# Patient Record
Sex: FEMALE | Race: WHITE | NOT HISPANIC OR LATINO | Employment: FULL TIME | URBAN - METROPOLITAN AREA
[De-identification: names, ages, dates, MRNs, and addresses within clinical notes are randomized per-mention and may not be internally consistent; named-entity substitution may affect disease eponyms.]

---

## 2017-01-10 RX ORDER — CLONAZEPAM 0.5 MG/1
0.25 TABLET ORAL 2 TIMES DAILY
Qty: 30 TAB | Refills: 0 | Status: SHIPPED
Start: 2017-01-18 | End: 2017-02-03

## 2017-01-10 RX ORDER — VENLAFAXINE HYDROCHLORIDE 75 MG/1
CAPSULE, EXTENDED RELEASE ORAL
Qty: 30 CAP | Refills: 0 | Status: SHIPPED | OUTPATIENT
Start: 2017-01-10 | End: 2017-02-03

## 2017-01-10 RX ORDER — BUPROPION HYDROCHLORIDE 150 MG/1
TABLET ORAL
Qty: 90 TAB | Refills: 0 | Status: SHIPPED | OUTPATIENT
Start: 2017-01-10 | End: 2017-02-03

## 2017-02-03 ENCOUNTER — OFFICE VISIT (OUTPATIENT)
Dept: BEHAVIORAL HEALTH | Facility: PHYSICIAN GROUP | Age: 57
End: 2017-02-03
Payer: COMMERCIAL

## 2017-02-03 VITALS
HEART RATE: 93 BPM | BODY MASS INDEX: 24.25 KG/M2 | DIASTOLIC BLOOD PRESSURE: 74 MMHG | HEIGHT: 68 IN | WEIGHT: 160 LBS | SYSTOLIC BLOOD PRESSURE: 114 MMHG

## 2017-02-03 DIAGNOSIS — F33.41 MDD (MAJOR DEPRESSIVE DISORDER), RECURRENT, IN PARTIAL REMISSION (HCC): ICD-10-CM

## 2017-02-03 PROCEDURE — 99213 OFFICE O/P EST LOW 20 MIN: CPT | Performed by: PSYCHIATRY & NEUROLOGY

## 2017-02-03 RX ORDER — CLONAZEPAM 0.5 MG/1
0.25 TABLET ORAL 2 TIMES DAILY
Qty: 30 TAB | Refills: 1 | Status: SHIPPED
Start: 2017-02-03 | End: 2017-04-07 | Stop reason: SDUPTHER

## 2017-02-03 RX ORDER — VENLAFAXINE HYDROCHLORIDE 75 MG/1
75 CAPSULE, EXTENDED RELEASE ORAL DAILY
Qty: 30 CAP | Refills: 1 | Status: SHIPPED | OUTPATIENT
Start: 2017-02-03 | End: 2017-03-25 | Stop reason: SDUPTHER

## 2017-02-03 RX ORDER — PHENTERMINE HYDROCHLORIDE 37.5 MG/1
37.5 TABLET ORAL
COMMUNITY
End: 2017-04-07

## 2017-02-03 RX ORDER — BUPROPION HYDROCHLORIDE 150 MG/1
150 TABLET ORAL EVERY MORNING
Qty: 90 TAB | Refills: 1 | Status: SHIPPED | OUTPATIENT
Start: 2017-02-03 | End: 2017-04-07 | Stop reason: SDUPTHER

## 2017-02-03 NOTE — PROGRESS NOTES
PSYCHIATRY FOLLOW-UP NOTE      Chief Complaint   Patient presents with   • Follow-Up     depression, anxiety         History Of Present Illness:  Sammi Alberto is a 56 y.o. old female with major depressive disorder, hypothyroidism, s/p lap band surgery, parathyroidectomy, left ACL and meniscal repair comes in today for follow up, was last seen 6 weeks ago. She reports doing better since last visit when she was switched over to Effexor. He really likes the combination of Wellbutrin and Effexor and has noticed a significant improvement in her mood. She is feeling a lot less angry and is able to process her feelings in a much better way. Denies any side effects that she noticed with Effexor. She has been taking her Klonopin mostly one day and has noticed an improvement in her anxiety and sleep as well. She is no longer using the Lunesta for sleep. She has noticed an improvement in appetite and some weight gain and has been following with a weight loss clinic and [place]. She was recently prescribed phentermine which she has taken in the past with good results on her weight. She is also trying to be more physically active and is eating healthy to. Her boyfriend and her sister have noticed an improvement in her mood as well. She is happy that she did not give up on her relationship and is moving in with her boyfriend soon. She was to keep her apartment just to see how the move close. She still feels distant to her boyfriend and other family and is trying to work on it. She is trying to look for another therapist as she felt it her previous therapist was not much helpful for her. She is able to trust her boyfriend better and is feeling more happy about this relationship. Denies having thoughts of wanting to hurt herself or others.    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 In a relationship for 4 years.  and  x 1, was   for 17 years and got / 7 years ago. She has 2 kids from her marriage - 21 and 21 yo sons. Older son lives in [place] and younger son lives in [facility]. She is close with both of her sons, all 4 siblings and mother. Father  when she was in her teen years. Lives alone in [place], has been living there for 1.5 years and will be moving in with her boyfriend soon.    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 90 Tab 1   • venlafaxine XR (EFFEXOR XR) 75 MG CAPSULE SR 24 HR Take 1 Cap by mouth every day. 30 Cap 1   • clonazepam (KLONOPIN) 0.5 MG Tab Take 0.5 Tabs by mouth 2 times a day. 30 Tab 1   • SUMAtriptan Succinate (IMITREX) 6 MG/0.5ML Solution Inject 6 mg as instructed Once.     • sumatriptan (IMITREX) 100 MG tablet Take 100 mg by mouth Once PRN for Migraine.     • valacyclovir (VALTREX) 1 GM Tab Take 1,000 mg by mouth 2 times a day.     • therapeutic multivitamin-minerals (THERAGRAN-M) Tab Take 1 Tab by mouth every day.     • pseudoephedrine (SUDAFED) 30 MG Tab Take 60 mg by mouth every four hours as needed for Congestion.     • levothyroxine (SYNTHROID) 112 MCG Tab Take 112 mcg by mouth Every morning on an empty stomach.       No current facility-administered medications for this visit.       Review Of Systems:    Constitutional - Positive for fatigue  Respiratory - Negative for shortness of breath, cough  CVS - Negative for chest pain, palpitations  GI - Negative for nausea, vomiting, abdominal pain, diarrhea, constipation  Musculoskeletal - Positive for left knee pain (improved)  Neurological - Positive for headaches  Psychiatric -  "Positive for depression, anxiety (improved)    Physical Examination:  Vital signs: /74 mmHg  Pulse 93  Ht 1.727 m (5' 7.99\")  Wt 72.576 kg (160 lb)  BMI 24.33 kg/m2    Musculoskeletal: Slow limping gait. No abnormal movements.     Mental Status Evaluation:   General: Middle aged white female, dressed in casual attire, good grooming and hygiene, in no apparent distress, calm and cooperative, good eye contact, psychomotor retardation  Orientation: Alert and oriented to person, place and time  Recent and remote memory: Grossly intact  Attention span and concentration: Grossly intact  Speech: Spontaneous, normal rate, rhythm and tone  Thought Process: Linear, logical and goal directed  Thought Content: Denies suicidal or homicidal ideations, intent or plan  Perception: Denies auditory or visual hallucinations. No delusions noted  Associations: Intact  Language: Appropriate  Fund of knowledge and vocabulary: Grossly adequate  Mood: \"am better\"  Affect: Euthymic, mood congruent  Insight: Good  Judgment: Good      Impression:  1. Major depressive disorder, recurrent, moderate with anxious distress    Medical Records/Labs/Diagnostic Tests Reviewed:  [facility] records - appropriate refills, no abuse suspected      Plan:  1. Continue Effexor XR 75 mg and Wellbutrin  mg in the morning for depression.   2. Continue Klonopin 0.25 mg twice daily for anxiety. #30 with one refill faxed to the pharmacy.  3. Discontinue Lunesta since her sleep has improved with Klonopin.    Return to clinic in 2 months or sooner if symptoms worsen    The proposed treatment plan was discussed with the patient who was provided the opportunity to ask questions and make suggestions regarding alternative treatment. Patient verbalized understanding and expressed agreement with the plan.     Opal Garcia M.D.  02/03/2017    This note was created using voice recognition software (Dragon). The accuracy of the dictation is limited by the " abilities of the software. I have reviewed the note prior to signing, however some errors in grammar and context are still possible. If you have any questions related to this note please do not hesitate to contact our office.

## 2017-04-07 ENCOUNTER — OFFICE VISIT (OUTPATIENT)
Dept: BEHAVIORAL HEALTH | Facility: PHYSICIAN GROUP | Age: 57
End: 2017-04-07
Payer: COMMERCIAL

## 2017-04-07 VITALS
DIASTOLIC BLOOD PRESSURE: 71 MMHG | HEART RATE: 86 BPM | SYSTOLIC BLOOD PRESSURE: 128 MMHG | BODY MASS INDEX: 24.71 KG/M2 | HEIGHT: 68 IN | WEIGHT: 163 LBS

## 2017-04-07 DIAGNOSIS — F50.81 BINGE-EATING DISORDER, MODERATE: Primary | ICD-10-CM

## 2017-04-07 DIAGNOSIS — F33.41 MDD (MAJOR DEPRESSIVE DISORDER), RECURRENT, IN PARTIAL REMISSION (HCC): ICD-10-CM

## 2017-04-07 PROBLEM — F50.811 BINGE-EATING DISORDER, MODERATE: Status: ACTIVE | Noted: 2017-04-07

## 2017-04-07 PROCEDURE — 99214 OFFICE O/P EST MOD 30 MIN: CPT | Performed by: PSYCHIATRY & NEUROLOGY

## 2017-04-07 RX ORDER — SUMATRIPTAN 100 MG/1
100 TABLET, FILM COATED ORAL
Qty: 10 TAB | Refills: 0 | Status: SHIPPED | OUTPATIENT
Start: 2017-04-07 | End: 2017-07-14 | Stop reason: SDUPTHER

## 2017-04-07 RX ORDER — VENLAFAXINE HYDROCHLORIDE 75 MG/1
75 CAPSULE, EXTENDED RELEASE ORAL DAILY
Qty: 30 CAP | Refills: 0 | Status: SHIPPED | OUTPATIENT
Start: 2017-04-07 | End: 2017-05-12 | Stop reason: SDUPTHER

## 2017-04-07 RX ORDER — CLONAZEPAM 0.5 MG/1
0.25 TABLET ORAL 2 TIMES DAILY PRN
Qty: 30 TAB | Refills: 0 | Status: SHIPPED
Start: 2017-04-07 | End: 2017-05-12 | Stop reason: SDUPTHER

## 2017-04-07 RX ORDER — BUPROPION HYDROCHLORIDE 150 MG/1
150 TABLET ORAL EVERY MORNING
Qty: 90 TAB | Refills: 0 | Status: SHIPPED | OUTPATIENT
Start: 2017-04-07 | End: 2017-04-20 | Stop reason: SDUPTHER

## 2017-04-07 RX ORDER — LISDEXAMFETAMINE DIMESYLATE CAPSULES 30 MG/1
30 CAPSULE ORAL EVERY MORNING
Qty: 30 CAP | Refills: 0 | Status: SHIPPED | OUTPATIENT
Start: 2017-04-07 | End: 2017-05-12

## 2017-04-07 NOTE — PROGRESS NOTES
"PSYCHIATRY FOLLOW-UP NOTE      Chief Complaint   Patient presents with   • Follow-Up     depression, anxiety   • Other     binge eating         History Of Present Illness:  Sammi Alberto is a 56 y.o. old female with major depressive disorder, hypothyroidism, s/p lap band surgery in 2010, parathyroidectomy, left ACL and meniscal repair comes in today for follow up, was last seen 2 months ago. She reports doing in terms of her anxiety and depression since her last visit here. She really likes the combination of Effexor and Wellbutrin and feels that this has been very helpful for her. She is not feeling depressed like before and is not crying. She recently moved in with her boyfriend and feels that the relationship is going good at this time. She is trying to trust him a lot more and feels that she is doing good. She also saw her new therapist today and feels that this might be a better fit for her. She reported a new symptom that she's been having since Christmas. She has been craving a lot for her sweet foods and has been eating a lot of candy and chocolate. She feels that she just stuffs her mouth with candy so that she is done as fast as she can.She states that she's been sneaking out of bed at night to eat candy. He has been hiding all the wrappers from her boyfriend so that he does not find that about her. She has tried not to get candy and chocolates at home but after a few days she has made special trips to the grocery store to get candy. She also reports that in a matter of 2 days she ate \"half gallon of ice cream\" which is very unusual for her. She feels ashamed about her food intake and is really embarrassed to even talk about it. She has had these symptoms in the past as well but never had any treatment for it. She is really proud of the 100 pound weight loss she had with the LAP-BAND surgery and wants to maintain it. It does not appear to be a symptom of depression as her appetite was poor when her " depression was worse. Denies any other stressors. Reports compliance with all of her psychiatric medications and denies any side effects. Denies any problems with energy or motivation. Denies any problems with focus or concentration. Sleep has been getting better. She has been using her Klonopin 0.25 mg 3 times a week to help her relax and with sleep. Denies having thoughts of wanting to hurt herself or others.    Social History:   Employed and has 2 jobs. 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 She has been  and  ×1. She is currently in a relationship for about 4 years. She has 2 kids from her marriage and both of her sons are in their 20s. Her older son lives in [place] and younger son lives in [place]. She is close with her kids, all of her siblings and mother. She lives with her boyfriend in [place], [place].    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 90 Tab 1   • clonazepam (KLONOPIN) 0.5 MG Tab Take 0.5 Tabs by mouth 2 times a day. 30 Tab 1   • phentermine (ADIPEX-P) 37.5 MG tablet Take 37.5 mg by mouth every morning before breakfast.     • SUMAtriptan Succinate (IMITREX) 6 MG/0.5ML Solution Inject 6 mg as instructed Once.     • sumatriptan (IMITREX) 100 MG tablet Take 100 mg by mouth Once PRN for Migraine.     • valacyclovir (VALTREX) 1 GM Tab Take 1,000 mg by mouth 2 times a day.     • therapeutic multivitamin-minerals  "(THERAGRAN-M) Tab Take 1 Tab by mouth every day.     • pseudoephedrine (SUDAFED) 30 MG Tab Take 60 mg by mouth every four hours as needed for Congestion.     • levothyroxine (SYNTHROID) 112 MCG Tab Take 112 mcg by mouth Every morning on an empty stomach.       No current facility-administered medications for this visit.       Review Of Systems:    Constitutional - Negative for fatigue  Respiratory - Negative for shortness of breath, cough  CVS - Negative for chest pain, palpitations  GI - Negative for nausea, vomiting, abdominal pain, diarrhea, constipation  Musculoskeletal - Positive for left knee pain (improved)  Neurological - Positive for headaches(improved)  Psychiatric - Positive for increased cravings for food    Physical Examination:  Vital signs: /71 mmHg  Pulse 86  Ht 1.727 m (5' 8\")  Wt 73.936 kg (163 lb)  BMI 24.79 kg/m2    Musculoskeletal: Slow limping gait. No abnormal movements.     Mental Status Evaluation:   General: Middle aged white female, dressed in casual attire, good grooming and hygiene, in no apparent distress, calm and cooperative, good eye contact, psychomotor retardation  Orientation: Alert and oriented to person, place and time  Recent and remote memory: Grossly intact  Attention span and concentration: Grossly intact  Speech: Spontaneous, normal rate, rhythm and tone  Thought Process: Linear, logical and goal directed  Thought Content: Denies suicidal or homicidal ideations, intent or plan  Perception: Denies auditory or visual hallucinations. No delusions noted  Associations: Intact  Language: Appropriate  Fund of knowledge and vocabulary: Grossly adequate  Mood: \"am fine\"  Affect: Euthymic, mood congruent  Insight: Good  Judgment: Good      Impression:  1. Major depressive disorder, recurrent, in partial remission (with anxious distress)  2. Binge eating disorder, moderate    Medical Records/Labs/Diagnostic Tests Reviewed:  [facility] records - appropriate refills, no abuse " suspected      Plan:  1. Discussed diagnosis and further management. Her symptoms appear to be related to binge eating disorder did not appear to be because of depression. Discussed combination of medications and psychotherapy and she is willing to give both a try. Start Vyvanse 30 mg in the morning for binge eating disorder. Provided her with one prescription for 30 tabs. Informed her that this is a controlled medication and this requires regular follow-ups with me. Informed her of the clinic policy of random urine drug screen and that medications will not be refilled if she loses her prescription for her medication follow-up.   2. Continue Effexor XR 75 mg and Wellbutrin  mg in the morning for depression.   2. Continue Klonopin 0.25 mg twice daily as needed for anxiety/sleep. #30 tabs with no refill faxed to the pharmacy.    Return to clinic in 4 weeks or sooner if symptoms worsen    The proposed treatment plan was discussed with the patient who was provided the opportunity to ask questions and make suggestions regarding alternative treatment. Patient verbalized understanding and expressed agreement with the plan.     Opal Garcia M.D.  04/07/2017    This note was created using voice recognition software (Dragon). The accuracy of the dictation is limited by the abilities of the software. I have reviewed the note prior to signing, however some errors in grammar and context are still possible. If you have any questions related to this note please do not hesitate to contact our office.

## 2017-04-20 RX ORDER — BUPROPION HYDROCHLORIDE 150 MG/1
450 TABLET ORAL EVERY MORNING
Qty: 90 TAB | Refills: 0 | Status: SHIPPED | OUTPATIENT
Start: 2017-04-20 | End: 2017-05-12 | Stop reason: SDUPTHER

## 2017-05-12 ENCOUNTER — OFFICE VISIT (OUTPATIENT)
Dept: BEHAVIORAL HEALTH | Facility: PHYSICIAN GROUP | Age: 57
End: 2017-05-12
Payer: COMMERCIAL

## 2017-05-12 VITALS
SYSTOLIC BLOOD PRESSURE: 119 MMHG | WEIGHT: 160 LBS | HEART RATE: 94 BPM | BODY MASS INDEX: 24.25 KG/M2 | DIASTOLIC BLOOD PRESSURE: 77 MMHG | HEIGHT: 68 IN

## 2017-05-12 DIAGNOSIS — F50.81 BINGE-EATING DISORDER, MODERATE: ICD-10-CM

## 2017-05-12 DIAGNOSIS — F33.41 MDD (MAJOR DEPRESSIVE DISORDER), RECURRENT, IN PARTIAL REMISSION (HCC): ICD-10-CM

## 2017-05-12 PROCEDURE — 99214 OFFICE O/P EST MOD 30 MIN: CPT | Performed by: PSYCHIATRY & NEUROLOGY

## 2017-05-12 RX ORDER — BUPROPION HYDROCHLORIDE 150 MG/1
450 TABLET ORAL EVERY MORNING
Qty: 90 TAB | Refills: 0 | Status: SHIPPED | OUTPATIENT
Start: 2017-05-12 | End: 2017-06-19 | Stop reason: SDUPTHER

## 2017-05-12 RX ORDER — VENLAFAXINE HYDROCHLORIDE 75 MG/1
75 CAPSULE, EXTENDED RELEASE ORAL DAILY
Qty: 30 CAP | Refills: 0 | Status: SHIPPED | OUTPATIENT
Start: 2017-05-12 | End: 2017-06-19 | Stop reason: SDUPTHER

## 2017-05-12 RX ORDER — CLONAZEPAM 0.5 MG/1
0.25 TABLET ORAL 2 TIMES DAILY PRN
Qty: 30 TAB | Refills: 0 | Status: SHIPPED | OUTPATIENT
Start: 2017-05-12 | End: 2017-06-19 | Stop reason: SDUPTHER

## 2017-05-12 RX ORDER — LISDEXAMFETAMINE DIMESYLATE CAPSULES 40 MG/1
40 CAPSULE ORAL EVERY MORNING
Qty: 30 CAP | Refills: 0 | Status: SHIPPED | OUTPATIENT
Start: 2017-05-12 | End: 2017-06-07

## 2017-05-12 NOTE — PROGRESS NOTES
PSYCHIATRY FOLLOW-UP NOTE      Chief Complaint   Patient presents with   • Follow-Up     depression, anxiety, eating disorder         History Of Present Illness:  Sammi Alberto is a 56 y.o. old female with major depressive disorder, hypothyroidism, s/p lap band surgery in 2010, parathyroidectomy, left ACL and meniscal repair comes in today for follow up, was last seen 4 weeks ago. She reports doing fine since her last visit here. Continues to be compliant with her Effexor and Wellbutrin and feels that it has been helping her. She has been seeing her therapist weekly and is planning on starting EMDR therapy. She states that she did not have a good childhood and was belittled a lot by her parents which has affected her self-esteem and relationships. She continues to struggle somewhat with motivation and energy. She has been forcing herself to get out of the house and do something for herself. She has been taking walks almost daily around her neighborhood. She was able to go and ski for the first time this season and enjoyed it. Denies anhedonia. Sleep has been at baseline. She has been taking Vyvanse and felt that it helped her eating symptoms for the first week or so. She had noticed less cravings for sweet food and was angelica eating less. However she has been feeling the same again and continues to binge eat especially on sweet foods. Denies any side effects that she noticed with Vyvanse. Continues to work on her relationship issues and things seem to be improving.     Social History:   Employed and has 2 jobs. 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 She has been  and  ×1. She is currently in a relationship for about 4 years. She has 2 kids from her marriage and both of her sons are in their 20s. Her older son lives in [place] and younger son lives in [place]. She is close with her  "kids, all of her siblings and mother. She lives with her boyfriend in [place], [place].    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 30 Cap 0   • buPROPion (WELLBUTRIN XL) 150 MG XL tablet Take 3 Tabs by mouth every morning. 90 Tab 0   • venlafaxine XR (EFFEXOR XR) 75 MG CAPSULE SR 24 HR Take 1 Cap by mouth every day. 30 Cap 0   • clonazepam (KLONOPIN) 0.5 MG Tab Take 0.5 Tabs by mouth 2 times a day as needed. 30 Tab 0   • sumatriptan (IMITREX) 100 MG tablet Take 1 Tab by mouth Once PRN for Migraine. 10 Tab 0   • SUMAtriptan Succinate (IMITREX) 6 MG/0.5ML Solution Inject 6 mg as instructed Once.     • valacyclovir (VALTREX) 1 GM Tab Take 1,000 mg by mouth 2 times a day.     • therapeutic multivitamin-minerals (THERAGRAN-M) Tab Take 1 Tab by mouth every day.     • pseudoephedrine (SUDAFED) 30 MG Tab Take 60 mg by mouth every four hours as needed for Congestion.     • levothyroxine (SYNTHROID) 112 MCG Tab Take 112 mcg by mouth Every morning on an empty stomach.       No current facility-administered medications for this visit.       Review Of Systems:    Constitutional - Negative for fatigue  Respiratory - Negative for shortness of breath, cough  CVS - Negative for chest pain, palpitations  GI - Negative for nausea, vomiting, abdominal pain, diarrhea, constipation  Musculoskeletal - Positive for left knee pain (improved)  Neurological - Positive for headaches (improved)  Psychiatric - Positive for increased cravings for food, depression    Physical Examination:  Vital signs: /77 mmHg  Pulse 94  Ht 1.727 m (5' 8\")  Wt 72.576 kg (160 lb)  BMI 24.33 " "kg/m2    Musculoskeletal: Slow limping gait. No abnormal movements.     Mental Status Evaluation:   General: Middle aged white female, teary eyed few times, dressed in casual attire, good grooming and hygiene, in no apparent distress, calm and cooperative, good eye contact, psychomotor retardation  Orientation: Alert and oriented to person, place and time  Recent and remote memory: Grossly intact  Attention span and concentration: Grossly intact  Speech: Spontaneous, normal rate, rhythm and tone  Thought Process: Linear, logical and goal directed  Thought Content: Denies suicidal or homicidal ideations, intent or plan  Perception: Denies auditory or visual hallucinations. No delusions noted  Associations: Intact  Language: Appropriate  Fund of knowledge and vocabulary: Grossly adequate  Mood: \"am fine\"  Affect: Euthymic, mood congruent  Insight: Good  Judgment: Good      Impression:  1. Major depressive disorder, recurrent, in partial remission (with anxious distress)  2. Binge eating disorder, moderate    Medical Records/Labs/Diagnostic Tests Reviewed:  [facility] records - appropriate refills, no abuse suspected      Plan:  1. Increase Vyvanse to 40 mg in the morning for binge eating disorder.   2. Continue Effexor XR 75 mg and Wellbutrin  mg in the morning for depression.   2. Continue Klonopin 0.25 mg twice daily as needed for anxiety/sleep. Provided her with one prescription for 30 tabs with no refill.    Return to clinic in 4 weeks or sooner if symptoms worsen    The proposed treatment plan was discussed with the patient who was provided the opportunity to ask questions and make suggestions regarding alternative treatment. Patient verbalized understanding and expressed agreement with the plan.     Opal Garcia M.D.  05/12/2017    This note was created using voice recognition software (Dragon). The accuracy of the dictation is limited by the abilities of the software. I have reviewed the note prior to " signing, however some errors in grammar and context are still possible. If you have any questions related to this note please do not hesitate to contact our office.

## 2017-06-07 ENCOUNTER — TELEPHONE (OUTPATIENT)
Dept: BEHAVIORAL HEALTH | Facility: PHYSICIAN GROUP | Age: 57
End: 2017-06-07

## 2017-06-07 RX ORDER — LISDEXAMFETAMINE DIMESYLATE CAPSULES 50 MG/1
50 CAPSULE ORAL EVERY MORNING
Qty: 30 CAP | Refills: 0 | Status: SHIPPED | OUTPATIENT
Start: 2017-06-07 | End: 2017-06-19 | Stop reason: SDUPTHER

## 2017-06-07 NOTE — TELEPHONE ENCOUNTER
Called back to discuss. She picked up her last prescription of Vyvanse on 12 May and is not due for refill for another few days. She has been doing good on the 40 mg which she feels has helped some what but she is still having binging episodes than those times she is having difficulty controlling her impulses. She is still overeating when that happens. Discussed trying a higher dose of 50 mg and she is agreeable. I have refilled her Vyvanse and she will  the prescription tomorrow from the clinic. Discussed that since it's a controlled medication she might not be able to get an early refill    ----- Message from Summer Hargrove sent at 6/7/2017 11:28 AM PDT -----  Regarding: Meds  Contact: [phone]  Pt will be out of the Vyvanse before her next appt, on 6/19/17 and is leaving [facility] next week for a business trip on Sunday. Can she get a new RX this week so she can fill it early? Only has 6 pills left.   Vyvanse, 40 MG, 1 X Daily - pt would like a call to know if she can come  the RX today??

## 2017-06-19 ENCOUNTER — OFFICE VISIT (OUTPATIENT)
Dept: BEHAVIORAL HEALTH | Facility: PHYSICIAN GROUP | Age: 57
End: 2017-06-19
Payer: COMMERCIAL

## 2017-06-19 VITALS
WEIGHT: 163 LBS | HEIGHT: 68 IN | SYSTOLIC BLOOD PRESSURE: 124 MMHG | HEART RATE: 91 BPM | BODY MASS INDEX: 24.71 KG/M2 | DIASTOLIC BLOOD PRESSURE: 80 MMHG

## 2017-06-19 DIAGNOSIS — F50.81 BINGE-EATING DISORDER, MODERATE: ICD-10-CM

## 2017-06-19 DIAGNOSIS — F33.41 MDD (MAJOR DEPRESSIVE DISORDER), RECURRENT, IN PARTIAL REMISSION (HCC): ICD-10-CM

## 2017-06-19 PROCEDURE — 99213 OFFICE O/P EST LOW 20 MIN: CPT | Performed by: PSYCHIATRY & NEUROLOGY

## 2017-06-19 PROCEDURE — 90833 PSYTX W PT W E/M 30 MIN: CPT | Performed by: PSYCHIATRY & NEUROLOGY

## 2017-06-19 RX ORDER — LISDEXAMFETAMINE DIMESYLATE CAPSULES 50 MG/1
50 CAPSULE ORAL EVERY MORNING
Qty: 30 CAP | Refills: 0 | Status: SHIPPED | OUTPATIENT
Start: 2017-07-08 | End: 2017-07-14 | Stop reason: SDUPTHER

## 2017-06-19 RX ORDER — VENLAFAXINE HYDROCHLORIDE 75 MG/1
75 CAPSULE, EXTENDED RELEASE ORAL DAILY
Qty: 30 CAP | Refills: 0 | Status: SHIPPED | OUTPATIENT
Start: 2017-06-19 | End: 2017-07-14 | Stop reason: SDUPTHER

## 2017-06-19 RX ORDER — BUPROPION HYDROCHLORIDE 150 MG/1
450 TABLET ORAL EVERY MORNING
Qty: 90 TAB | Refills: 0 | Status: SHIPPED | OUTPATIENT
Start: 2017-06-19 | End: 2017-07-14 | Stop reason: SDUPTHER

## 2017-06-19 RX ORDER — CLONAZEPAM 0.5 MG/1
0.25 TABLET ORAL 2 TIMES DAILY PRN
Qty: 30 TAB | Refills: 0 | Status: SHIPPED | OUTPATIENT
Start: 2017-06-19 | End: 2017-07-14 | Stop reason: SDUPTHER

## 2017-06-19 NOTE — PROGRESS NOTES
PSYCHIATRY FOLLOW-UP NOTE      Chief Complaint   Patient presents with   • Follow-Up     depression, anxiety, eating disorder         History Of Present Illness:  Sammi Alberto is a 56 y.o. old female with major depressive disorder, hypothyroidism, s/p lap band surgery in 2010, parathyroidectomy, left ACL and meniscal repair comes in today for follow up, was last seen 5 weeks ago. She reports that she was doing good until yesterday when her boyfriend asked him to move out of his home. They have been living together since February and they got into a really big argument yesterday and he asked to move out as soon as possible. He has given her an ultimatum that before her trip to [place] at the end of next month she needs to move out. She states that she still has problems trusting him and is not sure how to get past that. She still keeps in thinking about unspecific weekend where she thinks that he cheated on her which she has denied over and over again. She just wants a definitive proof that he did not cheat on her and he has not been able to do that. She keeps on bringing that up in some way or the other which causes stress in the relationship. She does love him and wants to be in this relationship but is not sure how to overcome her trust issues. She continues to see her therapist weekly and has been working on EMDR therapy. She was doing really good in regards to her depression and anxiety until this happened yesterday. She has been feeling overwhelmed and crying on and off since then. Her dose of Vyvanse was increased over a phone conversation and she has been on the higher dose for about 10 days. She was doing okay but since yesterday she has been struggling to eat which tends to happen when she is feeling overwhelmed. She has been compliant with her Effexor and Wellbutrin and denies any side effects. Continues to use Klonopin on an as-needed basis and was doing really good until yesterday when she had to take  it because she felt really anxious. Denies any passive or active thoughts of wanting to hurt herself    Social History:   Employed and has 2 jobs. 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 She has been  and  ×1. She is currently in a relationship for about 4 years. She has 2 kids from her marriage and both of her sons are in their 20s. Her older son lives in [place] and younger son lives in [place]. She is close with her kids, all of her siblings and mother. She lives with her boyfriend in [place], [place].    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 recent use, has been sober since she was 16 years old after an inpatient rehab in [place]  Nicotine - Denies  Illicit drugs - Denies    Past Medication Trials:  Prozac (ineffective), Nortriptyline     Medications:  Current Outpatient Prescriptions   Medication Sig Dispense Refill   • [START ON 7/8/2017] lisdexamfetamine (VYVANSE) 50 MG capsule Take 1 Cap by mouth every morning. 30 Cap 0   • clonazepam (KLONOPIN) 0.5 MG Tab Take 0.5 Tabs by mouth 2 times a day as needed (anxiety and/or sleep). 30 Tab 0   • venlafaxine XR (EFFEXOR XR) 75 MG CAPSULE SR 24 HR Take 1 Cap by mouth every day. 30 Cap 0   • buPROPion (WELLBUTRIN XL) 150 MG XL tablet Take 3 Tabs by mouth every morning. 90 Tab 0   • sumatriptan (IMITREX) 100 MG tablet Take 1 Tab by mouth Once PRN for Migraine. 10 Tab 0   • SUMAtriptan Succinate (IMITREX) 6 MG/0.5ML Solution Inject 6 mg as instructed Once.     • valacyclovir (VALTREX) 1 GM Tab Take 1,000 mg by mouth 2 times a day.     • therapeutic multivitamin-minerals (THERAGRAN-M) Tab Take 1 Tab by mouth every day.     • pseudoephedrine (SUDAFED) 30 MG Tab Take 60 mg by mouth every four hours as needed for Congestion.     • levothyroxine (SYNTHROID) 112 MCG Tab Take 112 mcg by mouth Every morning on an empty stomach.       No  "current facility-administered medications for this visit.       Review Of Systems:    Constitutional - Negative for fatigue  Respiratory - Negative for shortness of breath, cough  CVS - Negative for chest pain, palpitations  GI - Negative for nausea, vomiting, abdominal pain, diarrhea, constipation  Musculoskeletal - Positive for left knee pain (improved)  Neurological - Positive for headaches (improved)  Psychiatric - Positive for depression, anxiety    Physical Examination:  Vital signs: /80 mmHg  Pulse 91  Ht 1.727 m (5' 8\")  Wt 73.936 kg (163 lb)  BMI 24.79 kg/m2    Musculoskeletal: Normal  gait. No abnormal movements.     Mental Status Evaluation:   General: Middle aged white female, tearful few times, dressed in casual attire, good grooming and hygiene, in no apparent distress, calm and cooperative, good eye contact, psychomotor retardation  Orientation: Alert and oriented to person, place and time  Recent and remote memory: Grossly intact  Attention span and concentration: Grossly intact  Speech: Spontaneous, normal rate, rhythm and tone  Thought Process: Linear, logical and goal directed  Thought Content: Denies suicidal or homicidal ideations, intent or plan  Perception: Denies auditory or visual hallucinations. No delusions noted  Associations: Intact  Language: Appropriate  Fund of knowledge and vocabulary: Grossly adequate  Mood: \"not good today\"  Affect: Euthymic, mood congruent  Insight: Good  Judgment: Good      Impression:  1. Major depressive disorder, recurrent, in partial remission (with anxious distress)  2. Binge eating disorder, moderate    Medical Records/Labs/Diagnostic Tests Reviewed:  [facility] records - appropriate refills, no abuse suspected      Plan:  1. Continue Vyvanse 50 mg in the morning for binge eating disorder. Provided her with one prescription for 30 tabs.  2. Continue Effexor XR 75 mg and Wellbutrin  mg in the morning for depression.   3. Continue Klonopin 0.25 mg " twice daily as needed for anxiety/sleep. Provided her with one prescription for 30 tabs with no refill.  4. Provided supportive psychotherapy (> 16 minutes). Discussed her trust issues in the current relationship and how it seems to be affecting her and her boyfriend.     Return to clinic in 4 weeks or sooner if symptoms worsen    The proposed treatment plan was discussed with the patient who was provided the opportunity to ask questions and make suggestions regarding alternative treatment. Patient verbalized understanding and expressed agreement with the plan.     Opal Garcia M.D.  06/19/2017    This note was created using voice recognition software (Dragon). The accuracy of the dictation is limited by the abilities of the software. I have reviewed the note prior to signing, however some errors in grammar and context are still possible. If you have any questions related to this note please do not hesitate to contact our office.

## 2017-07-14 ENCOUNTER — OFFICE VISIT (OUTPATIENT)
Dept: BEHAVIORAL HEALTH | Facility: PHYSICIAN GROUP | Age: 57
End: 2017-07-14
Payer: COMMERCIAL

## 2017-07-14 VITALS
BODY MASS INDEX: 24.4 KG/M2 | HEIGHT: 68 IN | SYSTOLIC BLOOD PRESSURE: 108 MMHG | HEART RATE: 77 BPM | DIASTOLIC BLOOD PRESSURE: 71 MMHG | WEIGHT: 161 LBS

## 2017-07-14 DIAGNOSIS — F33.41 MDD (MAJOR DEPRESSIVE DISORDER), RECURRENT, IN PARTIAL REMISSION (HCC): ICD-10-CM

## 2017-07-14 DIAGNOSIS — F50.81 BINGE-EATING DISORDER, MODERATE: ICD-10-CM

## 2017-07-14 PROCEDURE — 99213 OFFICE O/P EST LOW 20 MIN: CPT | Performed by: PSYCHIATRY & NEUROLOGY

## 2017-07-14 RX ORDER — CLONAZEPAM 0.5 MG/1
0.25 TABLET ORAL 2 TIMES DAILY PRN
Qty: 30 TAB | Refills: 0 | Status: SHIPPED
Start: 2017-07-14 | End: 2017-08-07

## 2017-07-14 RX ORDER — VENLAFAXINE HYDROCHLORIDE 75 MG/1
75 CAPSULE, EXTENDED RELEASE ORAL DAILY
Qty: 30 CAP | Refills: 0 | Status: SHIPPED | OUTPATIENT
Start: 2017-07-14 | End: 2017-08-07

## 2017-07-14 RX ORDER — SUMATRIPTAN 100 MG/1
100 TABLET, FILM COATED ORAL
Qty: 10 TAB | Refills: 0 | Status: SHIPPED | OUTPATIENT
Start: 2017-07-14 | End: 2017-09-15 | Stop reason: SDUPTHER

## 2017-07-14 RX ORDER — LISDEXAMFETAMINE DIMESYLATE CAPSULES 50 MG/1
50 CAPSULE ORAL EVERY MORNING
Qty: 30 CAP | Refills: 0 | Status: SHIPPED | OUTPATIENT
Start: 2017-07-14 | End: 2017-08-07

## 2017-07-14 RX ORDER — BUPROPION HYDROCHLORIDE 150 MG/1
450 TABLET ORAL EVERY MORNING
Qty: 90 TAB | Refills: 0 | Status: SHIPPED | OUTPATIENT
Start: 2017-07-14 | End: 2017-08-07

## 2017-07-14 NOTE — PROGRESS NOTES
PSYCHIATRY FOLLOW-UP NOTE      Chief Complaint   Patient presents with   • Follow-Up     depression, anxiety, eating disorder         History Of Present Illness:  Sammi Alberto is a 57 y.o. old female with major depressive disorder, hypothyroidism, s/p lap band surgery in 2010, parathyroidectomy, left ACL and meniscal repair comes in today for follow up, was last seen 4 weeks ago. She reports doing okay since her last visit here. She is still having some relationship problems with her boyfriend but they have stopped discussing about the relationship until the end of this month. Her boyfriend currently has a lot of work related stressors and a project that is due by the end of this month. He is also having a lot of financial stressors and is at a point of losing his truck and house if his finances do not improve in the next few months. She does not want to stress anymore and they're not talking about their stressors until this project ends. She has conflicting feelings about this relationship and wants to give it a try. She continues to be in psychotherapy but they're not working on EMDR at this time. She continues to be compliant with all of her medications and endorses benefit. She has noticed that she doing really good at work in the last few weeks. Denies anhedonia, has been enjoying spending time outdoors. Sleep and appetite have been okay. She has noticed benefit from Vyvanse on her eating disorder. She still has some days where she binges on chocolate but has noticed that when she goes grocery shopping she is able to curb her impulses to get candy. Denies any other new stressors. Denies any side effects from her current psychotropic medications    Social History:   Employed and has 2 jobs. 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 She has been  and  ×1. She is currently in a  relationship for about 4 years. She has 2 kids from her marriage and both of her sons are in their 20s. Her older son lives in [place] and younger son lives in [place]. She is close with her kids, all of her siblings and mother. She lives with her boyfriend in [place], [place].    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 recent use, has been sober since she was 16 years old after an inpatient rehab in [place]  Nicotine - Denies  Illicit drugs - Denies    Past Medication Trials:  Prozac (ineffective), Nortriptyline     Medications:  Current Outpatient Prescriptions   Medication Sig Dispense Refill   • buPROPion (WELLBUTRIN XL) 150 MG XL tablet Take 3 Tabs by mouth every morning. 90 Tab 0   • venlafaxine XR (EFFEXOR XR) 75 MG CAPSULE SR 24 HR Take 1 Cap by mouth every day. 30 Cap 0   • clonazepam (KLONOPIN) 0.5 MG Tab Take 0.5 Tabs by mouth 2 times a day as needed (anxiety and/or sleep). 30 Tab 0   • lisdexamfetamine (VYVANSE) 50 MG capsule Take 1 Cap by mouth every morning. 30 Cap 0   • sumatriptan (IMITREX) 100 MG tablet Take 1 Tab by mouth Once PRN for Migraine. 10 Tab 0   • SUMAtriptan Succinate (IMITREX) 6 MG/0.5ML Solution Inject 6 mg as instructed Once.     • valacyclovir (VALTREX) 1 GM Tab Take 1,000 mg by mouth 2 times a day.     • therapeutic multivitamin-minerals (THERAGRAN-M) Tab Take 1 Tab by mouth every day.     • pseudoephedrine (SUDAFED) 30 MG Tab Take 60 mg by mouth every four hours as needed for Congestion.     • levothyroxine (SYNTHROID) 112 MCG Tab Take 112 mcg by mouth Every morning on an empty stomach.       No current facility-administered medications for this visit.       Review Of Systems:    Constitutional - Negative for fatigue  Respiratory - Negative for shortness of breath, cough  CVS - Negative for chest pain, palpitations  GI - Negative for nausea, vomiting, abdominal pain, diarrhea, constipation  Musculoskeletal - Positive for left knee pain (improved)  Neurological - Positive for  "headaches (improved)  Psychiatric - Positive for depression, anxiety - stable    Physical Examination:  Vital signs: /71 mmHg  Pulse 77  Ht 1.727 m (5' 8\")  Wt 73.029 kg (161 lb)  BMI 24.49 kg/m2    Musculoskeletal: Normal  gait. No abnormal movements.     Mental Status Evaluation:   General: Middle aged white female, dressed in casual attire, good grooming and hygiene, in no apparent distress, calm and cooperative, good eye contact, no psychomotor agitation or retardation  Orientation: Alert and oriented to person, place and time  Recent and remote memory: Grossly intact  Attention span and concentration: Grossly intact  Speech: Spontaneous, normal rate, rhythm and tone  Thought Process: Linear, logical and goal directed  Thought Content: Denies suicidal or homicidal ideations, intent or plan  Perception: Denies auditory or visual hallucinations. No delusions noted  Associations: Intact  Language: Appropriate  Fund of knowledge and vocabulary: Grossly adequate  Mood: \"am okay\"  Affect: Euthymic, mood congruent  Insight: Good  Judgment: Good      Impression:  1. Major depressive disorder, recurrent, in partial remission (with anxious distress)  2. Binge eating disorder, moderate    Medical Records/Labs/Diagnostic Tests Reviewed:  [facility] records - appropriate refills, no abuse suspected      Plan:  1. Continue Vyvanse 50 mg in the morning for binge eating disorder. Provided her with one prescription for 30 tabs.  2. Continue Effexor XR 75 mg and Wellbutrin  mg in the morning for depression.   3. Continue Klonopin 0.25 mg twice daily as needed for anxiety/sleep. She is mainly taking it at bedtime for sleep. #30 tabs with no refills prescription faxed to her pharmacy.   4. Continue individual psychotherapy    Return to clinic in 4 weeks or sooner if symptoms worsen    The proposed treatment plan was discussed with the patient who was provided the opportunity to ask questions and make suggestions " regarding alternative treatment. Patient verbalized understanding and expressed agreement with the plan.     Opal Garcia M.D.  07/14/2017    This note was created using voice recognition software (Dragon). The accuracy of the dictation is limited by the abilities of the software. I have reviewed the note prior to signing, however some errors in grammar and context are still possible. If you have any questions related to this note please do not hesitate to contact our office.

## 2017-08-11 ENCOUNTER — OFFICE VISIT (OUTPATIENT)
Dept: BEHAVIORAL HEALTH | Facility: PHYSICIAN GROUP | Age: 57
End: 2017-08-11
Payer: COMMERCIAL

## 2017-08-11 VITALS — WEIGHT: 165 LBS | BODY MASS INDEX: 23.62 KG/M2 | HEIGHT: 70 IN

## 2017-08-11 DIAGNOSIS — F41.9 ANXIETY DISORDER, UNSPECIFIED TYPE: ICD-10-CM

## 2017-08-11 DIAGNOSIS — F50.81 BINGE-EATING DISORDER, MODERATE: ICD-10-CM

## 2017-08-11 DIAGNOSIS — F33.41 MDD (MAJOR DEPRESSIVE DISORDER), RECURRENT, IN PARTIAL REMISSION (HCC): ICD-10-CM

## 2017-08-11 PROCEDURE — 99214 OFFICE O/P EST MOD 30 MIN: CPT | Performed by: PSYCHIATRY & NEUROLOGY

## 2017-08-11 PROCEDURE — 90833 PSYTX W PT W E/M 30 MIN: CPT | Performed by: PSYCHIATRY & NEUROLOGY

## 2017-08-11 RX ORDER — CLONAZEPAM 0.5 MG/1
0.25 TABLET ORAL 2 TIMES DAILY
Qty: 30 TAB | Refills: 0 | Status: SHIPPED | OUTPATIENT
Start: 2017-08-11 | End: 2017-10-13 | Stop reason: SDUPTHER

## 2017-08-11 RX ORDER — LISDEXAMFETAMINE DIMESYLATE 50 MG
CAPSULE ORAL
COMMUNITY
Start: 2017-07-15 | End: 2017-08-11 | Stop reason: SDUPTHER

## 2017-08-11 RX ORDER — BUPROPION HYDROCHLORIDE 150 MG/1
TABLET ORAL
COMMUNITY
Start: 2017-07-14 | End: 2017-08-11 | Stop reason: SDUPTHER

## 2017-08-11 RX ORDER — CLONAZEPAM 0.5 MG/1
TABLET ORAL
COMMUNITY
Start: 2017-07-14 | End: 2017-08-11 | Stop reason: SDUPTHER

## 2017-08-11 RX ORDER — LISDEXAMFETAMINE DIMESYLATE CAPSULES 50 MG/1
50 CAPSULE ORAL EVERY MORNING
Qty: 30 CAP | Refills: 0 | Status: SHIPPED | OUTPATIENT
Start: 2017-08-11 | End: 2017-09-07 | Stop reason: SDUPTHER

## 2017-08-11 RX ORDER — VENLAFAXINE HYDROCHLORIDE 75 MG/1
75 CAPSULE, EXTENDED RELEASE ORAL DAILY
Qty: 30 CAP | Refills: 0 | Status: SHIPPED | OUTPATIENT
Start: 2017-08-11 | End: 2017-09-15 | Stop reason: SDUPTHER

## 2017-08-11 RX ORDER — VENLAFAXINE HYDROCHLORIDE 75 MG/1
CAPSULE, EXTENDED RELEASE ORAL
COMMUNITY
Start: 2017-07-14 | End: 2017-08-11 | Stop reason: SDUPTHER

## 2017-08-11 RX ORDER — BUPROPION HYDROCHLORIDE 150 MG/1
450 TABLET ORAL EVERY MORNING
Qty: 90 TAB | Refills: 0 | Status: SHIPPED | OUTPATIENT
Start: 2017-08-11 | End: 2017-09-15 | Stop reason: SDUPTHER

## 2017-08-11 NOTE — PROGRESS NOTES
PSYCHIATRY FOLLOW-UP NOTE      Chief Complaint   Patient presents with   • Follow-Up     depression, anxiety, eating disorder         History Of Present Illness:  Sammi Alberto is a 57 y.o. old female with major depressive disorder, hypothyroidism, s/p lap band surgery in 2010, parathyroidectomy, left ACL and meniscal repair comes in today for follow up, was last seen 4 weeks ago. Since her last visit here, she and her boyfriend ended their relationship. She recently found out that her boyfriend has relapsed back on alcohol and she recently had her 40 years of sobriety. She knows that her supplied usually important to her and she did not want to be in this relationship if he drank alcohol. She is still living with him but plans on moving out of his home after she comes back from a work related trip. She will be moving in with a friend for a while and then will think about her next plan. She has been feeling overwhelmed and crying a lot over the loss of this relationship. However, now she does not think she will have regrets about how this relationship ended. She has been compliant with all of her medications and has been taking care of herself. Continues to work 2 different jobs and denies missing any time in the last week at either of the 2 jobs. Denies having any thoughts of wanting to hurt herself or others.    Social History:   Employed and has 2 jobs. 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 She has been  and  ×1. She is currently in a relationship for about 4 years. She has 2 kids from her marriage and both of her sons are in their 20s. Her older son lives in [place] and younger son lives in [place]. She is close with her kids, all of her siblings and mother. She lives with her boyfriend in [place], [place].    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 recent use, has been sober  "since she was 16 years old after an inpatient rehab in [place]  Nicotine - Denies  Illicit drugs - Denies    Past Medication Trials:  Prozac (ineffective), Nortriptyline     Medications:  Current Outpatient Prescriptions   Medication Sig Dispense Refill   • lisdexamfetamine (VYVANSE) 50 MG capsule Take 1 Cap by mouth every morning. 30 Cap 0   • clonazepam (KLONOPIN) 0.5 MG Tab Take 0.5 Tabs by mouth 2 times a day. 30 Tab 0   • venlafaxine XR (EFFEXOR XR) 75 MG CAPSULE SR 24 HR Take 1 Cap by mouth every day. 30 Cap 0   • buPROPion (WELLBUTRIN XL) 150 MG XL tablet Take 3 Tabs by mouth every morning. 90 Tab 0   • sumatriptan (IMITREX) 100 MG tablet Take 1 Tab by mouth Once PRN for Migraine. 10 Tab 0   • SUMAtriptan Succinate (IMITREX) 6 MG/0.5ML Solution Inject 6 mg as instructed Once.     • valacyclovir (VALTREX) 1 GM Tab Take 1,000 mg by mouth 2 times a day.     • therapeutic multivitamin-minerals (THERAGRAN-M) Tab Take 1 Tab by mouth every day.     • pseudoephedrine (SUDAFED) 30 MG Tab Take 60 mg by mouth every four hours as needed for Congestion.     • levothyroxine (SYNTHROID) 112 MCG Tab Take 112 mcg by mouth Every morning on an empty stomach.       No current facility-administered medications for this visit.       Review Of Systems:    Constitutional - Negative for fatigue  Respiratory - Negative for shortness of breath, cough  CVS - Negative for chest pain, palpitations  GI - Negative for nausea, vomiting, abdominal pain, diarrhea, constipation  Musculoskeletal - Positive for left knee pain (improved)  Neurological - Positive for headaches (improved)  Psychiatric - Positive for depression, anxiety    Physical Examination:  Vital signs: Ht 1.778 m (5' 10\")  Wt 74.844 kg (165 lb)  BMI 23.68 kg/m2    Musculoskeletal: Normal  gait. No abnormal movements.     Mental Status Evaluation:   General: Middle aged white female, tearful, dressed in casual attire, good grooming and hygiene, in no apparent distress, calm " "and cooperative, good eye contact, no psychomotor agitation or retardation  Orientation: Alert and oriented to person, place and time  Recent and remote memory: Grossly intact  Attention span and concentration: Grossly intact  Speech: Spontaneous, normal rate, rhythm and tone  Thought Process: Linear, logical and goal directed  Thought Content: Denies suicidal or homicidal ideations, intent or plan  Perception: Denies auditory or visual hallucinations. No delusions noted  Associations: Intact  Language: Appropriate  Fund of knowledge and vocabulary: Grossly adequate  Mood: \"am not doing well\"  Affect: Dysphoric, mood congruent  Insight: Good  Judgment: Good      Impression:  1. Major depressive disorder, recurrent, in partial remission (with anxious distress)  2. Binge eating disorder, moderate    Medical Records/Labs/Diagnostic Tests Reviewed:  [facility] records - appropriate refills, no abuse suspected      Plan:  1. Continue Vyvanse 50 mg in the morning for binge eating disorder. Provided her with one prescription for 30 tabs.  2. Continue Effexor XR 75 mg and Wellbutrin  mg in the morning for depression.   3. Continue Klonopin 0.25 mg twice daily as needed for anxiety/sleep.  4. Continue individual psychotherapy  5. Provided supportive psychotherapy (25 minutes). Discussed the recent loss of her relationship and to focus on herself at this time.     Return to clinic in 4 weeks or sooner if symptoms worsen    The proposed treatment plan was discussed with the patient who was provided the opportunity to ask questions and make suggestions regarding alternative treatment. Patient verbalized understanding and expressed agreement with the plan.     Opal Garcia M.D.  08/11/2017    Total face-to-face time: 35 minutes  More than 50% of face-to-face time was spent in counseling and coordinating care. Discussed relationship stressors.     This note was created using voice recognition software (Dragon). The accuracy " of the dictation is limited by the abilities of the software. I have reviewed the note prior to signing, however some errors in grammar and context are still possible. If you have any questions related to this note please do not hesitate to contact our office.

## 2017-08-11 NOTE — MR AVS SNAPSHOT
"        Sammi Alberto   2017 4:00 PM   Office Visit   MRN: [id number]    Department:  [facility]    Dept Phone:  [phone]    Description:  Female : 1960   Provider:  Opal Garcia M.D.           Reason for Visit     Follow-Up depression, anxiety, eating disorder      Allergies as of 2017     Allergen Noted Reactions    Codeine 10/18/2016   Rash    Systemic rash      You were diagnosed with     MDD (major depressive disorder), recurrent, in partial remission (CMS-[facility])   [495602]       Binge-eating disorder, moderate   [5849435]       Anxiety disorder, unspecified type   [2121036]         Vital Signs     Height Weight Body Mass Index Smoking Status          1.778 m (5' 10\") 74.844 kg (165 lb) 23.68 kg/m2 Never Smoker         Basic Information     Date Of Birth Sex Race Ethnicity Preferred Language    1960 Female White Non- English      Your appointments     Sep 15, 2017  4:00 PM   Follow Up Med Management with Opal Garcia M.D.   [facility] [address] [place] [address])    [address] [place] [address]   [phone]            Oct 13, 2017  3:30 PM   Follow Up Med Management with Opal Garcia M.D.   [facility] [address] [facility]    [address] [place] [address]   [phone]              Problem List              ICD-10-CM Priority Class Noted - Resolved    Acute meniscal tear of left knee S83.207A   10/15/2016 - Present    Rupture of anterior cruciate ligament of left knee S83.512A   10/15/2016 - Present    Hypothyroidism E03.9   2016 - Present    MDD (major depressive disorder), recurrent, in partial remission (CMS-[facility]) F33.41   2/3/2017 - Present    Binge-eating disorder, moderate F50.81   2017 - Present    Anxiety disorder F41.9   2017 - Present      Health Maintenance        Date Due Completion Dates    IMM DTaP/Tdap/Td Vaccine (1 - Tdap) 1978 ---    PAP SMEAR 1980 ---    MAMMOGRAM 1999 ---   " COLONOSCOPY 4/17/2009 ---    IMM INFLUENZA (1) 9/1/2017 ---            Current Immunizations     No immunizations on file.      Below and/or attached are the medications your provider expects you to take. Review all of your home medications and newly ordered medications with your provider and/or pharmacist. Follow medication instructions as directed by your provider and/or pharmacist. Please keep your medication list with you and share with your provider. Update the information when medications are discontinued, doses are changed, or new medications (including over-the-counter products) are added; and carry medication information at all times in the event of emergency situations     Allergies:  CODEINE - Rash               Medications  Valid as of: August 11, 2017 -  5:05 PM    Generic Name Brand Name Tablet Size Instructions for use    BuPROPion HCl (TABLET SR 24 HR) WELLBUTRIN  MG Take 3 Tabs by mouth every morning.        ClonazePAM (Tab) KLONOPIN 0.5 MG Take 0.5 Tabs by mouth 2 times a day.        Levothyroxine Sodium (Tab) SYNTHROID 112 MCG Take 112 mcg by mouth Every morning on an empty stomach.        Lisdexamfetamine Dimesylate (Cap) VYVANSE 50 MG Take 1 Cap by mouth every morning.        Multiple Vitamins-Minerals (Tab) THERAGRAN-M  Take 1 Tab by mouth every day.        Pseudoephedrine HCl (Tab) SUDAFED 30 MG Take 60 mg by mouth every four hours as needed for Congestion.        SUMAtriptan Succinate (Solution) IMITREX 6 MG/0.5ML Inject 6 mg as instructed Once.        SUMAtriptan Succinate (Tab) IMITREX 100 MG Take 1 Tab by mouth Once PRN for Migraine.        ValACYclovir HCl (Tab) VALTREX 1 GM Take 1,000 mg by mouth 2 times a day.        Venlafaxine HCl (CAPSULE SR 24 HR) EFFEXOR XR 75 MG Take 1 Cap by mouth every day.        .                 Medicines prescribed today were sent to:     [id number] - [place], [place] - [address]    [address] [place] [address]    Phone:  [phone] Fax: [phone]    Open 24 Hours?: No    [facility] - [place], [place] - [address]    [address] [place] [address]    Phone: [phone] Fax: [phone]    Open 24 Hours?: No      Medication refill instructions:       If your prescription bottle indicates you have medication refills left, it is not necessary to call your provider’s office. Please contact your pharmacy and they will refill your medication.    If your prescription bottle indicates you do not have any refills left, you may request refills at any time through one of the following ways: The online Leaderz system (except Urgent Care), by calling your provider’s office, or by asking your pharmacy to contact your provider’s office with a refill request. Medication refills are processed only during regular business hours and may not be available until the next business day. Your provider may request additional information or to have a follow-up visit with you prior to refilling your medication.   *Please Note: Medication refills are assigned a new Rx number when refilled electronically. Your pharmacy may indicate that no refills were authorized even though a new prescription for the same medication is available at the pharmacy. Please request the medicine by name with the pharmacy before contacting your provider for a refill.           Leaderz Access Code: [email or web address]  Expires: 9/6/2017  6:05 PM    Leaderz  A secure, online tool to manage your health information     [employer]’s Leaderz® is a secure, online tool that connects you to your personalized health information from the privacy of your home -- day or night - making it very easy for you to manage your healthcare. Once the activation process is completed, you can even access your medical information using the Leaderz brinda, which is available for free in the Apple Brinda store or Google Play store.     Leaderz provides the following levels of access (as  shown below):   My Chart Features   [facility] Doctor [facility]  Urgent  Care Non-[facility]  Primary Care  Doctor   Email your healthcare team securely and privately 24/7 X X X    Manage appointments: schedule your next appointment; view details of past/upcoming appointments X      Request prescription refills. X      View recent personal medical records, including lab and immunizations X X X X   View health record, including health history, allergies, medications X X X X   Read reports about your outpatient visits, procedures, consult and ER notes X X X X   See your discharge summary, which is a recap of your hospital and/or ER visit that includes your diagnosis, lab results, and care plan. X X       How to register for [employer]:  1. Go to  [email or web address].  2. Click on the Sign Up Now box, which takes you to the New Member Sign Up page. You will need to provide the following information:  a. Enter your Invieo Access Code exactly as it appears at the top of this page. (You will not need to use this code after you’ve completed the sign-up process. If you do not sign up before the expiration date, you must request a new code.)   b. Enter your date of birth.   c. Enter your home email address.   d. Click Submit, and follow the next screen’s instructions.  3. Create a Invieo ID. This will be your Invieo login ID and cannot be changed, so think of one that is secure and easy to remember.  4. Create a Invieo password. You can change your password at any time.  5. Enter your Password Reset Question and Answer. This can be used at a later time if you forget your password.   6. Enter your e-mail address. This allows you to receive e-mail notifications when new information is available in Invieo.  7. Click Sign Up. You can now view your health information.    For assistance activating your Invieo account, call [phone]

## 2017-09-07 NOTE — TELEPHONE ENCOUNTER
Was the patient seen in the last year in this department? Yes     Does patient have an active prescription for medications requested? Yes     Received Request Via: Patient     FYI: Pt didn't turn in the written script before it , pt needs another one written is coming into [facility] tomorrow to p/u

## 2017-09-08 RX ORDER — CLONAZEPAM 0.5 MG/1
0.25 TABLET ORAL 2 TIMES DAILY
Qty: 30 TAB | Refills: 0 | OUTPATIENT
Start: 2017-09-08

## 2017-09-08 RX ORDER — LISDEXAMFETAMINE DIMESYLATE CAPSULES 50 MG/1
50 CAPSULE ORAL EVERY MORNING
Qty: 30 CAP | Refills: 0 | Status: SHIPPED | OUTPATIENT
Start: 2017-09-08 | End: 2017-09-15 | Stop reason: SDUPTHER

## 2017-09-15 ENCOUNTER — OFFICE VISIT (OUTPATIENT)
Dept: BEHAVIORAL HEALTH | Facility: PHYSICIAN GROUP | Age: 57
End: 2017-09-15
Payer: COMMERCIAL

## 2017-09-15 VITALS
HEART RATE: 84 BPM | DIASTOLIC BLOOD PRESSURE: 78 MMHG | SYSTOLIC BLOOD PRESSURE: 139 MMHG | HEIGHT: 70 IN | BODY MASS INDEX: 23.34 KG/M2 | WEIGHT: 163 LBS

## 2017-09-15 DIAGNOSIS — F41.9 ANXIETY DISORDER, UNSPECIFIED TYPE: ICD-10-CM

## 2017-09-15 DIAGNOSIS — F50.81 BINGE-EATING DISORDER, MODERATE: ICD-10-CM

## 2017-09-15 DIAGNOSIS — F33.0 MDD (MAJOR DEPRESSIVE DISORDER), RECURRENT EPISODE, MILD (HCC): ICD-10-CM

## 2017-09-15 PROBLEM — F33.41 MDD (MAJOR DEPRESSIVE DISORDER), RECURRENT, IN PARTIAL REMISSION (HCC): Status: RESOLVED | Noted: 2017-02-03 | Resolved: 2017-09-15

## 2017-09-15 PROCEDURE — 99214 OFFICE O/P EST MOD 30 MIN: CPT | Performed by: PSYCHIATRY & NEUROLOGY

## 2017-09-15 PROCEDURE — 90833 PSYTX W PT W E/M 30 MIN: CPT | Performed by: PSYCHIATRY & NEUROLOGY

## 2017-09-15 RX ORDER — LISDEXAMFETAMINE DIMESYLATE CAPSULES 50 MG/1
50 CAPSULE ORAL EVERY MORNING
Qty: 30 CAP | Refills: 0 | Status: SHIPPED | OUTPATIENT
Start: 2017-09-15 | End: 2017-10-13 | Stop reason: SDUPTHER

## 2017-09-15 RX ORDER — VENLAFAXINE HYDROCHLORIDE 75 MG/1
75 CAPSULE, EXTENDED RELEASE ORAL DAILY
Qty: 30 CAP | Refills: 0 | Status: SHIPPED | OUTPATIENT
Start: 2017-09-15 | End: 2017-10-13 | Stop reason: SDUPTHER

## 2017-09-15 RX ORDER — BUPROPION HYDROCHLORIDE 150 MG/1
450 TABLET ORAL EVERY MORNING
Qty: 90 TAB | Refills: 0 | Status: SHIPPED | OUTPATIENT
Start: 2017-09-15 | End: 2017-10-13 | Stop reason: SDUPTHER

## 2017-09-15 RX ORDER — SUMATRIPTAN 100 MG/1
100 TABLET, FILM COATED ORAL
Qty: 10 TAB | Refills: 0 | Status: SHIPPED | OUTPATIENT
Start: 2017-09-15 | End: 2018-05-04

## 2017-09-15 NOTE — PROGRESS NOTES
PSYCHIATRY FOLLOW-UP NOTE      Chief Complaint   Patient presents with   • Follow-Up     depression, anxiety         History Of Present Illness:  Sammi Alberto is a 57 y.o. old female with major depressive disorder, hypothyroidism, s/p lap band surgery in , parathyroidectomy, left ACL and meniscal repair comes in today for follow up, was last seen 4 weeks ago. She continues to struggle with anxiety and depression since she and her boyfriend and up the relationship. She feels that she is still in love with him and does not want to completely understand the relationship. She has moved out of his home and is living alone in an apartment in [place]. She continues to be in touch with him and feels anxious when she doesn't hear from him in a while. She is aware that if she gets back with him she will not be happy in the long-term. She has been spending some time with her friends who have been very supportive of her. She continues to work both of her jobs and denies any impairments at work because of her psychosocial stressors. She has noticed a decline in her appetite and is eating less than before. She was unable to refill her Vyvanse on time and her prescription . She has been out of Vyvanse for a while and does not think that it decreases appetite but does help her with binge eating. She has been having a lot of impulses of eating candy and chocolate and has replaced candy with healthier food. She has also been feeling more anxious and is taking her Klonopin on a regular basis again. She has also been having a lot more headaches than before. She has been compliant with her Effexor and Wellbutrin and feels that they are helping as she is in a better place than last year when she had similar relationship stressors. Denies having thoughts of wanting to hurt herself or others.    Social History:   Employed and has 2 jobs. 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 She has been  and  ×1. She recently ended a relationship of over 4 years less than 2 months ago. She has 2 sons from her marriage who are in their 20s. Her older son lives in [place] and younger son lives in [place]. She is close with her kids, all of her siblings and mother. She lives alone in an apartment in [place].    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 10 Tab 0   • lisdexamfetamine (VYVANSE) 50 MG capsule Take 1 Cap by mouth every morning. 30 Cap 0   • buPROPion (WELLBUTRIN XL) 150 MG XL tablet Take 3 Tabs by mouth every morning. 90 Tab 0   • venlafaxine XR (EFFEXOR XR) 75 MG CAPSULE SR 24 HR Take 1 Cap by mouth every day. 30 Cap 0   • clonazepam (KLONOPIN) 0.5 MG Tab Take 0.5 Tabs by mouth 2 times a day. 30 Tab 0   • SUMAtriptan Succinate (IMITREX) 6 MG/0.5ML Solution Inject 6 mg as instructed Once.     • valacyclovir (VALTREX) 1 GM Tab Take 1,000 mg by mouth 2 times a day.     • therapeutic multivitamin-minerals (THERAGRAN-M) Tab Take 1 Tab by mouth every day.     • pseudoephedrine (SUDAFED) 30 MG Tab Take 60 mg by mouth every four hours as needed for Congestion.     • levothyroxine (SYNTHROID) 112 MCG Tab Take 112 mcg by mouth Every morning on an empty stomach.       No current facility-administered medications for this visit.        Review Of Systems:    Constitutional - Positive for fatigue, decreased appetite  Respiratory - Negative for shortness of breath, cough  CVS - Negative for chest pain, palpitations  GI - Negative for  "nausea, vomiting, abdominal pain, diarrhea, constipation  Musculoskeletal - Positive for right foot pain  Neurological - Positive for headaches   Psychiatric - Positive for depression, anxiety    Physical Examination:  Vital signs: /78   Pulse 84   Ht 1.778 m (5' 10\")   Wt 73.9 kg (163 lb)   BMI 23.39 kg/m²     Musculoskeletal: Normal  gait. No abnormal movements.     Mental Status Evaluation:   General: Middle aged white female, tearful, dressed in casual attire, good grooming and hygiene, in no apparent distress, calm and cooperative, good eye contact, no psychomotor agitation or retardation  Orientation: Alert and oriented to person, place and time  Recent and remote memory: Grossly intact  Attention span and concentration: Grossly intact  Speech: Spontaneous, normal rate, rhythm and tone  Thought Process: Linear, logical and goal directed  Thought Content: Denies suicidal or homicidal ideations, intent or plan  Perception: Denies auditory or visual hallucinations. No delusions noted  Associations: Intact  Language: Appropriate  Fund of knowledge and vocabulary: Grossly adequate  Mood: \"fine\"  Affect: Dysphoric, mood congruent  Insight: Good  Judgment: Good      Impression:  1. Major depressive disorder, recurrent, mild   2. Binge eating disorder, moderate  3. Unspecified anxiety disorder - likely due to relationship stressors    Medical Records/Labs/Diagnostic Tests Reviewed:  [facility] records - appropriate refills, no abuse suspected      Plan:  1. Continue Vyvanse 50 mg in the morning for binge eating disorder. Provided her with one prescription for 30 tabs.  2. Continue Effexor XR 75 mg and Wellbutrin  mg in the morning for depression.   3. Continue Klonopin 0.25 mg twice daily as needed for anxiety/sleep. She still has a prescription and will get it filled along with Vyvanse.   4. Continue individual psychotherapy  5. Provided supportive psychotherapy (> 16 minutes). Discussed her relationship " stressors and pros and cons of getting back with her ex.    Return to clinic in 4 weeks or sooner if symptoms worsen    The proposed treatment plan was discussed with the patient who was provided the opportunity to ask questions and make suggestions regarding alternative treatment. Patient verbalized understanding and expressed agreement with the plan.     Opal Garcia M.D.  09/15/17    This note was created using voice recognition software (Dragon). The accuracy of the dictation is limited by the abilities of the software. I have reviewed the note prior to signing, however some errors in grammar and context are still possible. If you have any questions related to this note please do not hesitate to contact our office.

## 2017-10-13 ENCOUNTER — OFFICE VISIT (OUTPATIENT)
Dept: BEHAVIORAL HEALTH | Facility: PHYSICIAN GROUP | Age: 57
End: 2017-10-13
Payer: COMMERCIAL

## 2017-10-13 VITALS
DIASTOLIC BLOOD PRESSURE: 86 MMHG | SYSTOLIC BLOOD PRESSURE: 141 MMHG | HEIGHT: 70 IN | BODY MASS INDEX: 23.48 KG/M2 | HEART RATE: 102 BPM | WEIGHT: 164 LBS

## 2017-10-13 DIAGNOSIS — F50.81 BINGE-EATING DISORDER, MODERATE: ICD-10-CM

## 2017-10-13 DIAGNOSIS — F33.0 MDD (MAJOR DEPRESSIVE DISORDER), RECURRENT EPISODE, MILD (HCC): ICD-10-CM

## 2017-10-13 DIAGNOSIS — F41.9 ANXIETY DISORDER, UNSPECIFIED TYPE: ICD-10-CM

## 2017-10-13 PROCEDURE — 99214 OFFICE O/P EST MOD 30 MIN: CPT | Performed by: PSYCHIATRY & NEUROLOGY

## 2017-10-13 RX ORDER — BUPROPION HYDROCHLORIDE 150 MG/1
450 TABLET ORAL EVERY MORNING
Qty: 90 TAB | Refills: 0 | Status: SHIPPED | OUTPATIENT
Start: 2017-10-13 | End: 2017-11-17 | Stop reason: SDUPTHER

## 2017-10-13 RX ORDER — VENLAFAXINE HYDROCHLORIDE 150 MG/1
150 CAPSULE, EXTENDED RELEASE ORAL DAILY
Qty: 30 CAP | Refills: 0 | Status: SHIPPED | OUTPATIENT
Start: 2017-10-13 | End: 2017-11-17 | Stop reason: SDUPTHER

## 2017-10-13 RX ORDER — LISDEXAMFETAMINE DIMESYLATE CAPSULES 50 MG/1
50 CAPSULE ORAL EVERY MORNING
Qty: 30 CAP | Refills: 0 | Status: SHIPPED | OUTPATIENT
Start: 2017-10-14 | End: 2017-11-17 | Stop reason: SDUPTHER

## 2017-10-13 RX ORDER — CLONAZEPAM 0.5 MG/1
.25-.5 TABLET ORAL
Qty: 30 TAB | Refills: 0 | Status: SHIPPED
Start: 2017-10-13 | End: 2017-11-17 | Stop reason: SDUPTHER

## 2017-10-13 NOTE — PROGRESS NOTES
PSYCHIATRY FOLLOW-UP NOTE      Chief Complaint   Patient presents with   • Follow-Up     depression, anxiety, eating disorder         History Of Present Illness:  Sammi Alberto is a 57 y.o. old female with major depressive disorder, hypothyroidism, s/p lap band surgery in 2010, parathyroidectomy, left ACL and meniscal repair comes in today for follow up, was last seen 4 weeks ago. She continues to struggle with depression and anxiety which have worsened with recent relationship stressors. She has been compliant with all of her psychotropic medications and endorses benefit and denies any side effects. However, she has noticed that she feels more depressed in the evenings and struggles with lack of motivation and fatigue. She has been trying really hard to understand herself and has joined a gym and is working with a health  as well. She is accountable for her food intake and sends a daily copy to her health . She has cut back on her candy intake and is not binging prior to bedtime. She still has the cravings but is no longer buying the big bags of candy. She continues to be compliant with Vyvanse and has felt an improvement in her ability to focus as well. Denies any impairments at work because of her depression or anxiety. She continues to talk to her boyfriend on a daily basis and has been having difficulty of letting go of this relationship. She wants to be in this relationship but is worried about her sobriety and the lack of trust. She does not feel that she is emotionally there to completely break up with him. Sleep and appetite have been good. She has been cooking at home which she has been enjoying. She continues to have crying spells mainly because of her relationship.    Social History:   Employed and has 2 jobs. 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 She has been  and   ×1. She recently ended a relationship of over 4 years.. She has 2 sons from her marriage who are in their 20s. Her older son lives in [place] and younger son lives in [place]. She is close with her kids, all of her siblings and mother. She lives alone in an apartment in [place].    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 30 Cap 0   • clonazepam (KLONOPIN) 0.5 MG Tab Take 0.5-1 Tabs by mouth every bedtime. 30 Tab 0   • [START ON 10/14/2017] lisdexamfetamine (VYVANSE) 50 MG capsule Take 1 Cap by mouth every morning. 30 Cap 0   • buPROPion (WELLBUTRIN XL) 150 MG XL tablet Take 3 Tabs by mouth every morning. 90 Tab 0   • sumatriptan (IMITREX) 100 MG tablet Take 1 Tab by mouth Once PRN for Migraine. 10 Tab 0   • SUMAtriptan Succinate (IMITREX) 6 MG/0.5ML Solution Inject 6 mg as instructed Once.     • valacyclovir (VALTREX) 1 GM Tab Take 1,000 mg by mouth 2 times a day.     • therapeutic multivitamin-minerals (THERAGRAN-M) Tab Take 1 Tab by mouth every day.     • pseudoephedrine (SUDAFED) 30 MG Tab Take 60 mg by mouth every four hours as needed for Congestion.     • levothyroxine (SYNTHROID) 112 MCG Tab Take 112 mcg by mouth Every morning on an empty stomach.       No current facility-administered medications for this visit.        Review Of Systems:    Constitutional - Positive for fatigue  Respiratory - Negative for shortness of breath, cough  CVS - Negative for chest pain, palpitations  GI - Negative for nausea, vomiting, abdominal pain, diarrhea, constipation  Musculoskeletal - Positive for right foot  "pain  Neurological - Positive for headaches   Psychiatric - Positive for depression, anxiety    Physical Examination:  Vital signs: /86   Pulse (!) 102   Ht 1.778 m (5' 10\")   Wt 74.4 kg (164 lb)   BMI 23.53 kg/m²     Musculoskeletal: Normal  gait. No abnormal movements.     Mental Status Evaluation:   General: Middle aged white female, tearful, dressed in casual attire, good grooming and hygiene, in no apparent distress, calm and cooperative, good eye contact, no psychomotor agitation or retardation  Orientation: Alert and oriented to person, place and time  Recent and remote memory: Grossly intact  Attention span and concentration: Grossly intact  Speech: Spontaneous, normal rate, rhythm and tone  Thought Process: Linear, logical and goal directed  Thought Content: Denies suicidal or homicidal ideations, intent or plan  Perception: Denies auditory or visual hallucinations. No delusions noted  Associations: Intact  Language: Appropriate  Fund of knowledge and vocabulary: Grossly adequate  Mood: \"fine\"  Affect: Dysphoric, mood congruent  Insight: Good  Judgment: Good      Impression:  1. Major depressive disorder, recurrent, mild   2. Binge eating disorder, in partial remission  3. Unspecified anxiety disorder - likely due to relationship stressors    Medical Records/Labs/Diagnostic Tests Reviewed:  [facility] records - appropriate refills, no abuse suspected      Plan:  1. Increase Effexor XR to 150 mg daily for depression and anxiety  2. Continue Wellbutrin  mg in the morning for depression and anxiety  3. Continue Vyvanse 50 mg in the morning for binge eating disorder. Provided her with one prescription for 30 tabs.  4. Continue Klonopin 0.25-0.5 mg at bedtime as needed for anxiety/sleep. #30 tabs with no refill prescription faxed to her pharmacy..   5. Continue individual psychotherapy    Return to clinic in 4 weeks or sooner if symptoms worsen    The proposed treatment plan was discussed with the " patient who was provided the opportunity to ask questions and make suggestions regarding alternative treatment. Patient verbalized understanding and expressed agreement with the plan.     Opal Garcia M.D.  10/13/17    This note was created using voice recognition software (Dragon). The accuracy of the dictation is limited by the abilities of the software. I have reviewed the note prior to signing, however some errors in grammar and context are still possible. If you have any questions related to this note please do not hesitate to contact our office.

## 2017-11-17 ENCOUNTER — OFFICE VISIT (OUTPATIENT)
Dept: BEHAVIORAL HEALTH | Facility: PHYSICIAN GROUP | Age: 57
End: 2017-11-17
Payer: COMMERCIAL

## 2017-11-17 VITALS
BODY MASS INDEX: 22.62 KG/M2 | DIASTOLIC BLOOD PRESSURE: 82 MMHG | SYSTOLIC BLOOD PRESSURE: 129 MMHG | WEIGHT: 158 LBS | HEIGHT: 70 IN | HEART RATE: 86 BPM

## 2017-11-17 DIAGNOSIS — F50.81 BINGE-EATING DISORDER, MODERATE: ICD-10-CM

## 2017-11-17 DIAGNOSIS — F41.9 ANXIETY DISORDER, UNSPECIFIED TYPE: ICD-10-CM

## 2017-11-17 DIAGNOSIS — F33.41 MDD (MAJOR DEPRESSIVE DISORDER), RECURRENT, IN PARTIAL REMISSION (HCC): ICD-10-CM

## 2017-11-17 PROCEDURE — 99214 OFFICE O/P EST MOD 30 MIN: CPT | Performed by: PSYCHIATRY & NEUROLOGY

## 2017-11-17 RX ORDER — CLONAZEPAM 0.5 MG/1
.25-.5 TABLET ORAL
Qty: 30 TAB | Refills: 0 | Status: SHIPPED | OUTPATIENT
Start: 2017-11-17 | End: 2017-12-08 | Stop reason: SDUPTHER

## 2017-11-17 RX ORDER — VENLAFAXINE HYDROCHLORIDE 150 MG/1
150 CAPSULE, EXTENDED RELEASE ORAL DAILY
Qty: 30 CAP | Refills: 1 | Status: SHIPPED | OUTPATIENT
Start: 2017-11-17 | End: 2018-01-16 | Stop reason: SDUPTHER

## 2017-11-17 RX ORDER — LISDEXAMFETAMINE DIMESYLATE CAPSULES 50 MG/1
50 CAPSULE ORAL EVERY MORNING
Qty: 30 CAP | Refills: 0 | Status: SHIPPED | OUTPATIENT
Start: 2017-11-17 | End: 2017-12-08 | Stop reason: SDUPTHER

## 2017-11-17 RX ORDER — BUPROPION HYDROCHLORIDE 150 MG/1
450 TABLET ORAL EVERY MORNING
Qty: 90 TAB | Refills: 1 | Status: SHIPPED | OUTPATIENT
Start: 2017-11-17 | End: 2018-01-16 | Stop reason: SDUPTHER

## 2017-11-17 ASSESSMENT — PATIENT HEALTH QUESTIONNAIRE - PHQ9
8. MOVING OR SPEAKING SO SLOWLY THAT OTHER PEOPLE COULD HAVE NOTICED. OR THE OPPOSITE, BEING SO FIGETY OR RESTLESS THAT YOU HAVE BEEN MOVING AROUND A LOT MORE THAN USUAL: 0
9. THOUGHTS THAT YOU WOULD BE BETTER OFF DEAD, OR OF HURTING YOURSELF: 0
2. FEELING DOWN, DEPRESSED, IRRITABLE, OR HOPELESS: 1
4. FEELING TIRED OR HAVING LITTLE ENERGY: 3
1. LITTLE INTEREST OR PLEASURE IN DOING THINGS: 1
7. TROUBLE CONCENTRATING ON THINGS, SUCH AS READING THE NEWSPAPER OR WATCHING TELEVISION: 0
6. FEELING BAD ABOUT YOURSELF - OR THAT YOU ARE A FAILURE OR HAVE LET YOURSELF OR YOUR FAMILY DOWN: 0
SUM OF ALL RESPONSES TO PHQ QUESTIONS 1-9: 6
SUM OF ALL RESPONSES TO PHQ9 QUESTIONS 1 AND 2: 2
5. POOR APPETITE OR OVEREATING: 1
3. TROUBLE FALLING OR STAYING ASLEEP OR SLEEPING TOO MUCH: 0

## 2017-11-17 NOTE — PROGRESS NOTES
PSYCHIATRY FOLLOW-UP NOTE      Chief Complaint   Patient presents with   • Follow-Up     depression, anxiety, eating disorder         History Of Present Illness:  Sammi Alberto is a 57 y.o. old female with major depressive disorder, hypothyroidism, s/p lap band surgery in 2010, parathyroidectomy, left ACL and meniscal repair comes in today for follow up, was last seen 4 weeks ago. She reports feeling better with the Effexor dose increase on her last appointment. She has not noticed any side effects with the higher dose. She was not compliant with her Synthyroid for the last 3-4 months and recently had some blood work done that was consistent with hypothyroidism. She has been having a lot more fatigue and hair loss. She has a prescription pending at her pharmacy and she plans on starting it this weekend. She has been doing better in regards to her binge eating. She had a bad day yesterday where she consumed a lot of candy. She does feel that the Vyvanse helps her with her binge eating and also with her concentration especially at work. Denies any side effects that she has noticed with Vyvanse. She has been a lot more physically active and has been playing [employer] several times a week. She has been spending a lot of time with her friends and avoiding her ex-boyfriend. She feels that it is over from her side and she is meeting somebody new today. She is crying less and is more hopeful for the future. She has been having a headache for the last 2 days and has been taking Imitrex which seemed to have helped. Denies any other new psychosocial stressors.    Social History:   Employed and has 2 jobs. 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 She has been  and  ×1. She recently ended a relationship of over 4 years. She has 2 sons from her marriage who are in their 20s. Her older son lives in  "[place] and younger son lives in [place]. She is close with her kids, all of her siblings and mother. She lives alone in an apartment in [place].    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 recent use, has been sober since the age of 16 after successfully completing an inpatient rehab in [place]  Nicotine - Denies  Illicit drugs - Denies    Past Medication Trials:  Prozac (ineffective), Nortriptyline     Medications:  Current Outpatient Prescriptions   Medication Sig Dispense Refill   • lisdexamfetamine (VYVANSE) 50 MG capsule Take 1 Cap by mouth every morning. 30 Cap 0   • clonazepam (KLONOPIN) 0.5 MG Tab Take 0.5-1 Tabs by mouth every bedtime. 30 Tab 0   • buPROPion (WELLBUTRIN XL) 150 MG XL tablet Take 3 Tabs by mouth every morning. 90 Tab 1   • venlafaxine (EFFEXOR-XR) 150 MG extended-release capsule Take 1 Cap by mouth every day. 30 Cap 1   • SUMAtriptan Succinate (IMITREX) 6 MG/0.5ML Solution Inject 6 mg as instructed Once.     • sumatriptan (IMITREX) 100 MG tablet Take 1 Tab by mouth Once PRN for Migraine. 10 Tab 0   • valacyclovir (VALTREX) 1 GM Tab Take 1,000 mg by mouth 2 times a day.     • therapeutic multivitamin-minerals (THERAGRAN-M) Tab Take 1 Tab by mouth every day.     • pseudoephedrine (SUDAFED) 30 MG Tab Take 60 mg by mouth every four hours as needed for Congestion.     • levothyroxine (SYNTHROID) 112 MCG Tab Take 112 mcg by mouth Every morning on an empty stomach.       No current facility-administered medications for this visit.        Review Of Systems:    Constitutional - Negative for fatigue  Respiratory - Negative for shortness of breath, cough  CVS - Negative for chest pain, palpitations  GI - Negative for nausea, vomiting, abdominal pain, diarrhea, constipation  Musculoskeletal - Negative for back pain  Neurological - Positive for headaches   Psychiatric - Positive for depression, anxiety - improved    Physical Examination:  Vital signs: /82   Pulse 86   Ht 1.778 m (5' 10\") " "  Wt 71.7 kg (158 lb)   BMI 22.67 kg/m²     Musculoskeletal: Normal  gait. No abnormal movements.     Mental Status Evaluation:   General: Middle aged white female, dressed in casual attire, good grooming and hygiene, in no apparent distress, calm and cooperative, good eye contact, no psychomotor agitation or retardation  Orientation: Alert and oriented to person, place and time  Recent and remote memory: Grossly intact  Attention span and concentration: Grossly intact  Speech: Spontaneous, normal rate, rhythm and tone  Thought Process: Linear, logical and goal directed  Thought Content: Denies suicidal or homicidal ideations, intent or plan  Perception: Denies auditory or visual hallucinations. No delusions noted  Associations: Intact  Language: Appropriate  Fund of knowledge and vocabulary: Grossly adequate  Mood: \"I think am better\"  Affect: Euthymic, mood congruent  Insight: Good  Judgment: Good      Impression:  1. Major depressive disorder, recurrent, in partial remission   2. Binge eating disorder, in partial remission  3. Unspecified anxiety disorder - likely due to relationship stressors    Medical Records/Labs/Diagnostic Tests Reviewed:  [facility] records - appropriate refills, no abuse suspected      Plan:  1. Continue Effexor  mg and Wellbutrin  mg daily for depression and anxiety  2. Continue Vyvanse 50 mg in the morning for binge eating disorder. Provided her with one prescription for 30 tabs.  3. Continue Klonopin 0.25-0.5 mg at bedtime as needed for anxiety/sleep. Provided her with one prescription for 30 tabs with no refills.   4. Continue individual psychotherapy    Return to clinic in 4 weeks or sooner if symptoms worsen    The proposed treatment plan was discussed with the patient who was provided the opportunity to ask questions and make suggestions regarding alternative treatment. Patient verbalized understanding and expressed agreement with the plan.     Opal Garcia, " M.D.  11/17/17    This note was created using voice recognition software (Dragon). The accuracy of the dictation is limited by the abilities of the software. I have reviewed the note prior to signing, however some errors in grammar and context are still possible. If you have any questions related to this note please do not hesitate to contact our office.

## 2017-12-08 ENCOUNTER — OFFICE VISIT (OUTPATIENT)
Dept: BEHAVIORAL HEALTH | Facility: PHYSICIAN GROUP | Age: 57
End: 2017-12-08
Payer: COMMERCIAL

## 2017-12-08 VITALS
SYSTOLIC BLOOD PRESSURE: 119 MMHG | DIASTOLIC BLOOD PRESSURE: 83 MMHG | WEIGHT: 160 LBS | HEART RATE: 76 BPM | BODY MASS INDEX: 22.9 KG/M2 | HEIGHT: 70 IN

## 2017-12-08 DIAGNOSIS — F41.9 ANXIETY DISORDER, UNSPECIFIED TYPE: ICD-10-CM

## 2017-12-08 DIAGNOSIS — F50.81 BINGE-EATING DISORDER, MODERATE: ICD-10-CM

## 2017-12-08 DIAGNOSIS — F33.41 MDD (MAJOR DEPRESSIVE DISORDER), RECURRENT, IN PARTIAL REMISSION (HCC): ICD-10-CM

## 2017-12-08 PROCEDURE — 99214 OFFICE O/P EST MOD 30 MIN: CPT | Performed by: PSYCHIATRY & NEUROLOGY

## 2017-12-08 PROCEDURE — 90833 PSYTX W PT W E/M 30 MIN: CPT | Performed by: PSYCHIATRY & NEUROLOGY

## 2017-12-08 RX ORDER — CLONAZEPAM 0.5 MG/1
.25-.5 TABLET ORAL
Qty: 30 TAB | Refills: 1 | Status: SHIPPED | OUTPATIENT
Start: 2017-12-16 | End: 2018-01-16 | Stop reason: SDUPTHER

## 2017-12-08 RX ORDER — LISDEXAMFETAMINE DIMESYLATE CAPSULES 50 MG/1
50 CAPSULE ORAL EVERY MORNING
Qty: 30 CAP | Refills: 0 | Status: SHIPPED | OUTPATIENT
Start: 2018-01-14 | End: 2018-01-16 | Stop reason: SDUPTHER

## 2017-12-08 RX ORDER — LISDEXAMFETAMINE DIMESYLATE CAPSULES 50 MG/1
50 CAPSULE ORAL EVERY MORNING
Qty: 30 CAP | Refills: 0 | Status: SHIPPED | OUTPATIENT
Start: 2017-12-16 | End: 2017-12-18 | Stop reason: SDUPTHER

## 2017-12-08 NOTE — PROGRESS NOTES
PSYCHIATRY FOLLOW-UP NOTE      Chief Complaint   Patient presents with   • Follow-Up     depression, anxiety, eating disorder         History Of Present Illness:  Sammi Alberto is a 57 y.o. old female with major depressive disorder, hypothyroidism, s/p lap band surgery in 2010, parathyroidectomy, left ACL and meniscal repair comes in today for follow up, was last seen 3 weeks ago. She reports doing fine in regards to her depression and anxiety. She has been compliant with all of her psychotropic medications and denies any side effects. She has been traveling a lot for her job and had to reschedule this appointment to make sure that all of her medications were refilled. She recently started seeing somebody and has been feeling happy in this relationship. However, she wants to take things slow and she does not want to get hurt like her previous relationship. She found out that her ex-boyfriend has been cheating on her with another woman and this has been extremely painful for her. She still has a lot of love for him that he does not want to get back together because of all the lies he has said. She feels extremely disappointed in herself for letting this relationship go on for over 5 years. However, she has really good support from her friends who have been supportive since this relationship ended. She has been keeping herself busy with both her jobs. She had a nice Thanksgiving with her son. She has been doing good in regards to her eating disorder as well. Denies any recent purging behaviors. Continues to take Klonopin at bedtime which helps her both with nighttime anxiety and sleep. Denies any new psychosocial stressors.    Social History:   Employed and has 2 jobs. 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 She has been  and  ×1. She recently ended a relationship of over 4 years. She has  2 sons from her marriage who are in their 20s. Her older son lives in [place] and younger son lives in [place]. She is close with her kids, all of her siblings and mother. She lives alone in an apartment in [place].    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 recent use, has been sober since the age of 16 after successfully completing an inpatient rehab in [place]  Nicotine - Denies  Illicit drugs - Denies    Past Medication Trials:  Prozac (ineffective), Nortriptyline     Medications:  Current Outpatient Prescriptions   Medication Sig Dispense Refill   • [START ON 12/16/2017] lisdexamfetamine (VYVANSE) 50 MG capsule Take 1 Cap by mouth every morning. 30 Cap 0   • [START ON 1/14/2018] lisdexamfetamine (VYVANSE) 50 MG capsule Take 1 Cap by mouth every morning. 30 Cap 0   • [START ON 12/16/2017] clonazepam (KLONOPIN) 0.5 MG Tab Take 0.5-1 Tabs by mouth every bedtime. 30 Tab 1   • buPROPion (WELLBUTRIN XL) 150 MG XL tablet Take 3 Tabs by mouth every morning. 90 Tab 1   • venlafaxine (EFFEXOR-XR) 150 MG extended-release capsule Take 1 Cap by mouth every day. 30 Cap 1   • sumatriptan (IMITREX) 100 MG tablet Take 1 Tab by mouth Once PRN for Migraine. 10 Tab 0   • SUMAtriptan Succinate (IMITREX) 6 MG/0.5ML Solution Inject 6 mg as instructed Once.     • valacyclovir (VALTREX) 1 GM Tab Take 1,000 mg by mouth 2 times a day.     • therapeutic multivitamin-minerals (THERAGRAN-M) Tab Take 1 Tab by mouth every day.     • pseudoephedrine (SUDAFED) 30 MG Tab Take 60 mg by mouth every four hours as needed for Congestion.     • levothyroxine (SYNTHROID) 112 MCG Tab Take 112 mcg by mouth Every morning on an empty stomach.       No current facility-administered medications for this visit.        Review Of Systems:    Constitutional - Negative for fatigue  Respiratory - Negative for shortness of breath, cough  CVS - Negative for chest pain, palpitations  GI - Negative for nausea, vomiting, abdominal pain, diarrhea,  "constipation  Musculoskeletal - Negative for back pain  Neurological - Positive for headaches   Psychiatric - Positive for depression, anxiety - improved    Physical Examination:  Vital signs: /83   Pulse 76   Ht 1.778 m (5' 10\")   Wt 72.6 kg (160 lb)   BMI 22.96 kg/m²     Musculoskeletal: Normal  gait. No abnormal movements.     Mental Status Evaluation:   General: Middle aged white female, tearful at times, dressed in casual attire, good grooming and hygiene, in no apparent distress, calm and cooperative, good eye contact, no psychomotor agitation or retardation  Orientation: Alert and oriented to person, place and time  Recent and remote memory: Grossly intact  Attention span and concentration: Grossly intact  Speech: Spontaneous, normal rate, rhythm and tone  Thought Process: Linear, logical and goal directed  Thought Content: Denies suicidal or homicidal ideations, intent or plan  Perception: Denies auditory or visual hallucinations. No delusions noted  Associations: Intact  Language: Appropriate  Fund of knowledge and vocabulary: Grossly adequate  Mood: \"am okay\"  Affect: Euthymic, mood congruent  Insight: Good  Judgment: Good      Impression:  1. Major depressive disorder, recurrent, in partial remission   2. Binge eating disorder, in partial remission  3. Unspecified anxiety disorder - likely due to relationship stressors    Medical Records/Labs/Diagnostic Tests Reviewed:  [facility] - appropriate refills, no abuse suspected      Plan:  1. Continue Effexor  mg and Wellbutrin  mg daily for depression and anxiety  2. Continue Vyvanse 50 mg in the morning for binge eating disorder. Provided her with 2 prescription for 30 tabs.  3. Continue Klonopin 0.25-0.5 mg at bedtime as needed for anxiety/sleep. Provided her with one prescription for 30 tabs with 1 refill.   4. Continue individual psychotherapy with her outside therapist  5. Provided supportive psychotherapy (> 16 minutes). " Discussed the disconnect between her thoughts and feelings regarding her relationship and how to work towards closure.    Return to clinic in 6 weeks or sooner if symptoms worsen    The proposed treatment plan was discussed with the patient who was provided the opportunity to ask questions and make suggestions regarding alternative treatment. Patient verbalized understanding and expressed agreement with the plan.     Opal Garcia M.D.  12/08/17    This note was created using voice recognition software (Dragon). The accuracy of the dictation is limited by the abilities of the software. I have reviewed the note prior to signing, however some errors in grammar and context are still possible. If you have any questions related to this note please do not hesitate to contact our office.

## 2017-12-18 DIAGNOSIS — F50.81 BINGE-EATING DISORDER, MODERATE: ICD-10-CM

## 2017-12-18 RX ORDER — LISDEXAMFETAMINE DIMESYLATE CAPSULES 50 MG/1
50 CAPSULE ORAL EVERY MORNING
Qty: 30 CAP | Refills: 0 | Status: SHIPPED | OUTPATIENT
Start: 2017-12-18 | End: 2018-01-16

## 2017-12-18 NOTE — TELEPHONE ENCOUNTER
"Patient called and stated that she lost her Vyvanse prescription for the month of December. I checked her  rickets and her refills are appropriate to date. Her pharmacy was called to clarify and decided they don't have any prescriptions pending. I called her and she stated that her pharmacy would not take the Vyvanse prescriptions as they were \"too far out\". She has the paper prescription of Klonopin and Vyvanse that is due in January. She is not sure what happened to her prescription for this month. I let her know that I will refill her Vyvanse prescription for this month but advised her to be careful with her paper prescriptions.  "

## 2018-01-16 ENCOUNTER — DOCUMENTATION (OUTPATIENT)
Dept: BEHAVIORAL HEALTH | Facility: PHYSICIAN GROUP | Age: 58
End: 2018-01-16

## 2018-01-16 DIAGNOSIS — F41.9 ANXIETY DISORDER, UNSPECIFIED TYPE: ICD-10-CM

## 2018-01-16 DIAGNOSIS — F50.81 BINGE-EATING DISORDER, MODERATE: ICD-10-CM

## 2018-01-16 RX ORDER — CLONAZEPAM 0.5 MG/1
.25-.5 TABLET ORAL
Qty: 30 TAB | Refills: 0 | Status: SHIPPED
Start: 2018-01-16 | End: 2018-02-23 | Stop reason: SDUPTHER

## 2018-01-16 RX ORDER — BUPROPION HYDROCHLORIDE 150 MG/1
450 TABLET ORAL EVERY MORNING
Qty: 90 TAB | Refills: 1 | Status: SHIPPED | OUTPATIENT
Start: 2018-01-16 | End: 2018-03-01 | Stop reason: SDUPTHER

## 2018-01-16 RX ORDER — VENLAFAXINE HYDROCHLORIDE 150 MG/1
150 CAPSULE, EXTENDED RELEASE ORAL DAILY
Qty: 30 CAP | Refills: 1 | Status: SHIPPED | OUTPATIENT
Start: 2018-01-16 | End: 2018-03-01

## 2018-01-16 RX ORDER — LISDEXAMFETAMINE DIMESYLATE CAPSULES 50 MG/1
50 CAPSULE ORAL EVERY MORNING
Qty: 30 CAP | Refills: 0 | Status: SHIPPED | OUTPATIENT
Start: 2018-01-16 | End: 2018-02-14 | Stop reason: SDUPTHER

## 2018-01-16 NOTE — PROGRESS NOTES
Patient came to office today to  her Vyvanse prescription. She informed me that she found her lost Vyvanse prescription stuck to a frozen food bag. She has since discarded the prescription.

## 2018-02-02 ENCOUNTER — APPOINTMENT (OUTPATIENT)
Dept: BEHAVIORAL HEALTH | Facility: PHYSICIAN GROUP | Age: 58
End: 2018-02-02
Payer: COMMERCIAL

## 2018-02-14 DIAGNOSIS — F50.81 BINGE-EATING DISORDER, MODERATE: ICD-10-CM

## 2018-02-14 RX ORDER — LISDEXAMFETAMINE DIMESYLATE CAPSULES 50 MG/1
50 CAPSULE ORAL EVERY MORNING
Qty: 30 CAP | Refills: 0 | Status: SHIPPED | OUTPATIENT
Start: 2018-02-15 | End: 2018-03-01 | Stop reason: SDUPTHER

## 2018-02-23 DIAGNOSIS — F41.9 ANXIETY DISORDER, UNSPECIFIED TYPE: ICD-10-CM

## 2018-02-23 RX ORDER — CLONAZEPAM 0.5 MG/1
.25-.5 TABLET ORAL
Qty: 30 TAB | Refills: 0 | Status: SHIPPED
Start: 2018-02-23 | End: 2018-03-01 | Stop reason: SDUPTHER

## 2018-03-01 ENCOUNTER — OFFICE VISIT (OUTPATIENT)
Dept: BEHAVIORAL HEALTH | Facility: PHYSICIAN GROUP | Age: 58
End: 2018-03-01
Payer: COMMERCIAL

## 2018-03-01 VITALS
HEART RATE: 90 BPM | DIASTOLIC BLOOD PRESSURE: 80 MMHG | WEIGHT: 161 LBS | HEIGHT: 70 IN | BODY MASS INDEX: 23.05 KG/M2 | SYSTOLIC BLOOD PRESSURE: 127 MMHG

## 2018-03-01 DIAGNOSIS — F33.1 MDD (MAJOR DEPRESSIVE DISORDER), RECURRENT EPISODE, MODERATE (HCC): ICD-10-CM

## 2018-03-01 DIAGNOSIS — F41.1 GAD (GENERALIZED ANXIETY DISORDER): ICD-10-CM

## 2018-03-01 DIAGNOSIS — F50.81 BINGE-EATING DISORDER, MODERATE: ICD-10-CM

## 2018-03-01 PROCEDURE — 99212 OFFICE O/P EST SF 10 MIN: CPT | Performed by: PSYCHIATRY & NEUROLOGY

## 2018-03-01 PROCEDURE — 90833 PSYTX W PT W E/M 30 MIN: CPT | Performed by: PSYCHIATRY & NEUROLOGY

## 2018-03-01 RX ORDER — LISDEXAMFETAMINE DIMESYLATE CAPSULES 50 MG/1
50 CAPSULE ORAL EVERY MORNING
Qty: 30 CAP | Refills: 0 | Status: SHIPPED | OUTPATIENT
Start: 2018-03-21 | End: 2018-04-20

## 2018-03-01 RX ORDER — CLONAZEPAM 0.5 MG/1
0.5 TABLET ORAL 2 TIMES DAILY
Qty: 60 TAB | Refills: 1 | Status: SHIPPED | OUTPATIENT
Start: 2018-03-01 | End: 2018-03-31

## 2018-03-01 RX ORDER — VENLAFAXINE HYDROCHLORIDE 75 MG/1
225 CAPSULE, EXTENDED RELEASE ORAL DAILY
Qty: 90 CAP | Refills: 1 | Status: SHIPPED | OUTPATIENT
Start: 2018-03-01 | End: 2018-05-06 | Stop reason: SDUPTHER

## 2018-03-01 RX ORDER — LISDEXAMFETAMINE DIMESYLATE CAPSULES 50 MG/1
50 CAPSULE ORAL EVERY MORNING
Qty: 30 CAP | Refills: 0 | Status: SHIPPED | OUTPATIENT
Start: 2018-04-19 | End: 2018-05-04 | Stop reason: SDUPTHER

## 2018-03-01 RX ORDER — BUPROPION HYDROCHLORIDE 150 MG/1
450 TABLET ORAL EVERY MORNING
Qty: 90 TAB | Refills: 1 | Status: SHIPPED | OUTPATIENT
Start: 2018-03-01 | End: 2018-05-19 | Stop reason: SDUPTHER

## 2018-03-01 NOTE — PROGRESS NOTES
PSYCHIATRY FOLLOW-UP NOTE      Chief Complaint   Patient presents with   • Follow-Up     depression, anxiety, eating disorder         History Of Present Illness:  Sammi Alberto is a 57 y.o. old female with major depressive disorder, hypothyroidism, s/p lap band surgery in 2010, parathyroidectomy, left ACL and meniscal repair comes in today for follow up, was last seen over 2 months ago. She has been struggling with more depression and anxiety in the last several weeks. She continues to have relationship stressors with her ex-boyfriend. She feels that she is completely in love with him and is willing to forget all that has happened in the relationship to get back with him. She stated that she is even willing to let go of her sobriety of 40+ years to be in this relationship as he struggles with alcohol addiction. She is willing to let go of all the times he cheated on her while they were in a relationship. She feels missing being in a relationship with him and tries to get hold of him every now and then. She has been struggling more with depression, , irritability and angry. She has been crying a lot more and binge eating candies on a daily basis. She states that there are days when all she eats his candy even though she knows it is bad for her. Sleep has been poor. She has been feeling a lot more anxious and has difficulty calming her down. She has been taking her Klonopin but does not feel that her current dose is helping her anxiety or her sleep. She has also noticed a significant lack of motivation and fatigue and impairments at work. She has been compliant with all of her psychotropic medications and denies any side effects. Denies having thoughts of wanting to hurt herself or others. She injured her knee again while skiing last month and has been struggling with some knee pain as well.    Social History:   Employed and has 2 jobs. 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 She has been  and  ×1. She recently ended a relationship of over 4 years. She has 2 sons from her marriage who are in their 20s. Her older son lives in [place] and younger son lives in [place]. She is close with her kids, all of her siblings and mother. She lives alone in an apartment in [place].    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 recent use, has been sober since the age of 16 after successfully completing an inpatient rehab in [place]  Nicotine - Denies  Illicit drugs - Denies    Past Medication Trials:  Prozac (ineffective), Nortriptyline     Medications:  Current Outpatient Prescriptions   Medication Sig Dispense Refill   • [START ON 3/21/2018] lisdexamfetamine (VYVANSE) 50 MG capsule Take 1 Cap by mouth every morning for 30 days. 30 Cap 0   • [START ON 4/19/2018] lisdexamfetamine (VYVANSE) 50 MG capsule Take 1 Cap by mouth every morning for 30 days. 30 Cap 0   • clonazePAM (KLONOPIN) 0.5 MG Tab Take 1 Tab by mouth 2 times a day for 30 days. 60 Tab 1   • buPROPion (WELLBUTRIN XL) 150 MG XL tablet Take 3 Tabs by mouth every morning. 90 Tab 1   • venlafaxine XR (EFFEXOR XR) 75 MG CAPSULE SR 24 HR Take 3 Caps by mouth every day. 90 Cap 1   • sumatriptan (IMITREX) 100 MG tablet Take 1 Tab by mouth Once PRN for Migraine. 10 Tab 0   • SUMAtriptan Succinate (IMITREX) 6 MG/0.5ML Solution Inject 6 mg as instructed Once.     • valacyclovir (VALTREX) 1 GM Tab Take 1,000 mg by mouth 2 times a day.     • therapeutic multivitamin-minerals (THERAGRAN-M) Tab Take 1 Tab by mouth every day.     • pseudoephedrine (SUDAFED) 30 MG Tab Take 60 mg by mouth every four hours as needed for Congestion.     • levothyroxine (SYNTHROID) 112 MCG Tab Take 112 mcg by mouth Every morning on an empty stomach.       No current facility-administered medications for this visit.        Review Of Systems:    Constitutional - Positive for  "fatigue  Respiratory - Negative for shortness of breath, cough  CVS - Negative for chest pain, palpitations  GI - Negative for nausea, vomiting, abdominal pain, diarrhea, constipation  Musculoskeletal - Positive for left knee pain  Neurological - Positive for headaches   Psychiatric - Positive for depression, anxiety, binge eating episodes, poor sleep    Physical Examination:  Vital signs: /80   Pulse 90   Ht 1.778 m (5' 10\")   Wt 73 kg (161 lb)   BMI 23.10 kg/m²     Musculoskeletal: Normal  gait. No abnormal movements.     Mental Status Evaluation:   General: Middle aged white female, tearful at times, dressed in casual attire, good grooming and hygiene, in no apparent distress, calm and cooperative, poor eye contact, psychomotor retardation  Orientation: Alert and oriented to person, place and time  Recent and remote memory: Grossly intact  Attention span and concentration: Grossly intact  Speech: Spontaneous, normal rate, rhythm and tone  Thought Process: Linear, logical and goal directed  Thought Content: Denies suicidal or homicidal ideations, intent or plan  Perception: Denies auditory or visual hallucinations. No delusions noted  Associations: Intact  Language: Appropriate  Fund of knowledge and vocabulary: Grossly adequate  Mood: \"am fine\"  Affect: Dysphoric, mood congruent  Insight: Good  Judgment: Good    Medical Records/Labs/Diagnostic Tests Reviewed:  [facility] - appropriate refills, no abuse suspected       Impression:  1. Major depressive disorder, recurrent, moderate - worsening   2. Binge eating disorder, moderate - worsening  3. Generalized anxiety disorder - new problem     Plan:  1. Increase Effexor XR to 225 mg in the morning for depression and anxiety  2. Continue Wellbutrin  mg in the morning for depression and anxiety  3. Increase Klonopin 0.5 mg twice daily for anxiety and/or sleep. Provided her with one prescription for 60 tabs with one refill.  4. Continue Vyvanse 50 mg " in the morning for binge eating disorder. Provided her with 2 prescription for 30 tabs.   5. Continue individual psychotherapy with her outside therapist  6. Provided supportive psychotherapy (> 16 minutes). Discussed her emotional conflict regarding the relationship and introduced the concept of this relationship being in a direction for her..    Return to clinic in 4 weeks or sooner if symptoms worsen    The proposed treatment plan was discussed with the patient who was provided the opportunity to ask questions and make suggestions regarding alternative treatment. Patient verbalized understanding and expressed agreement with the plan.     Opal Garcia M.D.  03/01/18    This note was created using voice recognition software (Dragon). The accuracy of the dictation is limited by the abilities of the software. I have reviewed the note prior to signing, however some errors in grammar and context are still possible. If you have any questions related to this note please do not hesitate to contact our office.

## 2018-04-04 ENCOUNTER — OFFICE VISIT (OUTPATIENT)
Dept: BEHAVIORAL HEALTH | Facility: PHYSICIAN GROUP | Age: 58
End: 2018-04-04
Payer: COMMERCIAL

## 2018-04-04 VITALS
SYSTOLIC BLOOD PRESSURE: 108 MMHG | BODY MASS INDEX: 24.05 KG/M2 | HEIGHT: 70 IN | WEIGHT: 168 LBS | DIASTOLIC BLOOD PRESSURE: 69 MMHG | HEART RATE: 75 BPM

## 2018-04-04 DIAGNOSIS — F50.81 BINGE-EATING DISORDER, MODERATE: ICD-10-CM

## 2018-04-04 DIAGNOSIS — F41.1 GAD (GENERALIZED ANXIETY DISORDER): ICD-10-CM

## 2018-04-04 DIAGNOSIS — F33.1 MDD (MAJOR DEPRESSIVE DISORDER), RECURRENT EPISODE, MODERATE (HCC): ICD-10-CM

## 2018-04-04 PROCEDURE — 90833 PSYTX W PT W E/M 30 MIN: CPT | Performed by: PSYCHIATRY & NEUROLOGY

## 2018-04-04 PROCEDURE — 99214 OFFICE O/P EST MOD 30 MIN: CPT | Performed by: PSYCHIATRY & NEUROLOGY

## 2018-04-04 RX ORDER — POLYMYXIN B SULFATE AND TRIMETHOPRIM 1; 10000 MG/ML; [USP'U]/ML
SOLUTION OPHTHALMIC
COMMUNITY
Start: 2018-04-01 | End: 2019-04-19

## 2018-04-04 ASSESSMENT — PATIENT HEALTH QUESTIONNAIRE - PHQ9
SUM OF ALL RESPONSES TO PHQ QUESTIONS 1-9: 12
CLINICAL INTERPRETATION OF PHQ2 SCORE: 6
5. POOR APPETITE OR OVEREATING: 3 - NEARLY EVERY DAY

## 2018-04-04 NOTE — PROGRESS NOTES
PSYCHIATRY FOLLOW-UP NOTE      Chief Complaint   Patient presents with   • Follow-Up     depression, anxiety, eating disorder         History Of Present Illness:  Sammi Alberto is a 57 y.o. old female with major depressive disorder, hypothyroidism, s/p lap band surgery in 2010, parathyroidectomy, left ACL and meniscal repair comes in today for follow up, was last seen 4 weeks ago. She reports feeling both depressed and anxious since her last visit with me. She increased her dose of Effexor and has been compliant with Wellbutrin as well but continues to struggle with depression, low motivation and anhedonia. She has been isolating herself and staying in her apartment as much as she can. She works 2 jobs and her full-time job was from home and she has not had any impairments at work because of her depression. She also works part-time at a local hospital and has been going there one day on the weekend as well. She is not spending time with her friends as she does not want to talk about her feelings. She is still perseverating about her relationship that ended about 6 months ago. She has not had any contact with her ex-boyfriend but still misses him. She has also been eating a lot more candy than before and has gained some weight. She states that she eats 2 pounds of M&Ms on a daily basis. She likes to be physically active but is struggling to do anything at this time because of her lack of motivation. She is in physical therapy for her left knee injury which seems to be getting better. She has been advised to start swimming that she has not done that. She has been compliant with Vyvanse but does not think that her current dose is helping her much. She has been sleeping better with Klonopin and denies any recent panic attacks. In his physical outbursts of anger. She denies having thoughts of wanting to hurt herself or others.    Social History:   Employed and has 2 jobs. 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 She has been  and  ×1. She recently ended a relationship of over 4 years. She has 2 sons from her marriage who are in their 20s. Her older son lives in [place] and younger son lives in [place]. She is close with her kids, all of her siblings and mother. She lives alone in an apartment in [place].    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 recent use, has been sober since the age of 16 after successfully completing an inpatient rehab in [place]  Nicotine - Denies  Illicit drugs - Denies    Past Medication Trials:  Prozac (ineffective), Nortriptyline     Medications:  Current Outpatient Prescriptions   Medication Sig Dispense Refill   • polymixin-trimethoprim (POLYTRIM) 25196-1.1 UNIT/ML-% Solution      • lisdexamfetamine (VYVANSE) 50 MG capsule Take 1 Cap by mouth every morning for 30 days. 30 Cap 0   • buPROPion (WELLBUTRIN XL) 150 MG XL tablet Take 3 Tabs by mouth every morning. 90 Tab 1   • venlafaxine XR (EFFEXOR XR) 75 MG CAPSULE SR 24 HR Take 3 Caps by mouth every day. 90 Cap 1   • therapeutic multivitamin-minerals (THERAGRAN-M) Tab Take 1 Tab by mouth every day.     • levothyroxine (SYNTHROID) 112 MCG Tab Take 112 mcg by mouth Every morning on an empty stomach.     • [START ON 4/19/2018] lisdexamfetamine (VYVANSE) 50 MG capsule Take 1 Cap by mouth every morning for 30 days. 30 Cap 0   • sumatriptan (IMITREX) 100 MG tablet Take 1 Tab by mouth Once PRN for Migraine. 10 Tab 0   • SUMAtriptan Succinate (IMITREX) 6 MG/0.5ML Solution Inject 6 mg as instructed Once.     • valacyclovir (VALTREX) 1 GM Tab Take 1,000 mg by mouth 2 times a day.     • pseudoephedrine (SUDAFED) 30 MG Tab Take 60 mg by mouth every four hours as needed for Congestion.       No current facility-administered medications for this visit.        Review Of Systems:    Constitutional - Positive for fatigue  Respiratory -  "Negative for shortness of breath, cough  CVS - Negative for chest pain, palpitations  GI - Negative for nausea, vomiting, abdominal pain, diarrhea, constipation  Musculoskeletal - Positive for left knee pain  Neurological - Positive for headaches   Psychiatric - Positive for depression, anxiety, binge eating episodes    Physical Examination:  Vital signs: /69   Pulse 75   Ht 1.778 m (5' 10\")   Wt 76.2 kg (168 lb)   BMI 24.11 kg/m²     Musculoskeletal: Normal  gait. No abnormal movements.     Mental Status Evaluation:   General: Middle aged white female, tearful, dressed in casual attire, good grooming and hygiene, in no apparent distress, calm and cooperative, poor eye contact, psychomotor retardation  Orientation: Alert and oriented to person, place and time  Recent and remote memory: Grossly intact  Attention span and concentration: Grossly intact  Speech: Spontaneous, normal rate, rhythm and tone  Thought Process: Linear, logical and goal directed  Thought Content: Denies suicidal or homicidal ideations, intent or plan  Perception: Denies auditory or visual hallucinations. No delusions noted  Associations: Intact  Language: Appropriate  Fund of knowledge and vocabulary: Grossly adequate  Mood: \"am fine\"  Affect: Dysphoric, mood congruent  Insight: Good  Judgment: Good    Depression screening:  Depression Screen (PHQ-2/PHQ-9) 11/17/2017 4/4/2018   PHQ-2 Total Score 2 -   PHQ-2 Total Score - 6   PHQ-9 Total Score 6 -   PHQ-9 Total Score - 12       Interpretation of PHQ-9 Total Score   Score Severity   1-4 No Depression   5-9 Mild Depression   10-14 Moderate Depression   15-19 Moderately Severe Depression   20-27 Severe Depression    Medical Records/Labs/Diagnostic Tests Reviewed:  [facility]  records - appropriate refills, no abuse suspected       Impression:  1. Major depressive disorder, recurrent, moderate - worsening   2. Binge eating disorder, moderate - worsening  3. Generalized anxiety disorder - " worsening     Plan:  1. Continue Effexor  mg in the morning for depression and anxiety  2. Continue Wellbutrin  mg in the morning for depression and anxiety  3. Continue Klonopin 0.5 mg twice daily for anxiety and/or sleep. Not refilled today  4. Continue Vyvanse 50 mg in the morning for binge eating disorder. Not refilled today  5. Continue individual psychotherapy with her outside therapist  6. Provided supportive psychotherapy (> 16 minutes). Discussed the disconnect between her brain and heart. Encouraged her to push against the depression and to be more physically active.    Return to clinic in 4 weeks or sooner if symptoms worsen    The proposed treatment plan was discussed with the patient who was provided the opportunity to ask questions and make suggestions regarding alternative treatment. Patient verbalized understanding and expressed agreement with the plan.     Opal Garcia M.D.  04/04/18    This note was created using voice recognition software (Dragon). The accuracy of the dictation is limited by the abilities of the software. I have reviewed the note prior to signing, however some errors in grammar and context are still possible. If you have any questions related to this note please do not hesitate to contact our office.

## 2018-05-04 ENCOUNTER — OFFICE VISIT (OUTPATIENT)
Dept: BEHAVIORAL HEALTH | Facility: PHYSICIAN GROUP | Age: 58
End: 2018-05-04
Payer: COMMERCIAL

## 2018-05-04 VITALS
DIASTOLIC BLOOD PRESSURE: 73 MMHG | BODY MASS INDEX: 23.05 KG/M2 | SYSTOLIC BLOOD PRESSURE: 110 MMHG | WEIGHT: 161 LBS | HEART RATE: 80 BPM | HEIGHT: 70 IN

## 2018-05-04 DIAGNOSIS — F50.81 BINGE-EATING DISORDER, MODERATE: ICD-10-CM

## 2018-05-04 DIAGNOSIS — F41.1 GAD (GENERALIZED ANXIETY DISORDER): ICD-10-CM

## 2018-05-04 PROCEDURE — 99214 OFFICE O/P EST MOD 30 MIN: CPT | Performed by: PSYCHIATRY & NEUROLOGY

## 2018-05-04 RX ORDER — ZOLMITRIPTAN 2.5 MG/1
2.5 TABLET, FILM COATED ORAL
COMMUNITY
Start: 2018-04-17 | End: 2019-05-03

## 2018-05-04 RX ORDER — LISDEXAMFETAMINE DIMESYLATE CAPSULES 50 MG/1
50 CAPSULE ORAL EVERY MORNING
Qty: 30 CAP | Refills: 0 | Status: SHIPPED | OUTPATIENT
Start: 2018-05-18 | End: 2018-06-04 | Stop reason: SDUPTHER

## 2018-05-04 RX ORDER — CLONAZEPAM 0.5 MG/1
0.5 TABLET ORAL 2 TIMES DAILY
COMMUNITY
Start: 2018-04-09 | End: 2018-05-04 | Stop reason: SDUPTHER

## 2018-05-04 RX ORDER — CLONAZEPAM 0.5 MG/1
0.5 TABLET ORAL 2 TIMES DAILY
Qty: 60 TAB | Refills: 2 | Status: SHIPPED
Start: 2018-05-08 | End: 2018-06-07

## 2018-05-04 NOTE — PROGRESS NOTES
PSYCHIATRY FOLLOW-UP NOTE      Chief Complaint   Patient presents with   • Follow-Up     anxiety, depression, eating disorder         History Of Present Illness:  Sammi Alberto is a 58 y.o. old female with major depressive disorder, hypothyroidism, s/p lap band surgery in 2010, parathyroidectomy, left ACL and meniscal repair comes in today for follow up, was last seen 4 weeks ago. She reports having ups and downs in regards to her mood and anxiety since her last visit here. She has been little bit more motivated to fight against the depression and has been more physically active. She has completely cut off refined sugars from her diet and has been feeling good about it. Denies any recent binge eating episodes specialty on candy. She has joined an online fitness group which has been motivating her to be more physically active. She sounded more hopeful as well. She has been having fewer crying spells in the last few weeks. She has been spending more time with her good friends as well. She recently flew to [place] to be with her 88-year-old mother who was hospitalized for a COPD exacerbation. She is in hospice now and the family is preparing themselves for her passing away. She just came back home last night and is feeling extremely fatigued from the trip. She is not close to her mother as she was not around much when she and her siblings were growing up. She feels that she is ready if her mother passes away and she has good support from all of her siblings. She has not had any contact with her ex-boyfriend but still thinks about their relationship a lot. She does not plan on getting back with him together at least at this time. She is crying less and is feeling less irritable. She is also noticed that she is needing less Klonopin now that she is more active. Her headaches have been better and so has her knee pain as well. Denies any thoughts of wanting to hurt herself or anybody else.    Social History:   Employed  and has 2 jobs. 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 She has been  and  ×1. She recently ended a relationship of over 4 years. She has 2 sons from her marriage who are in their 20s. Her older son lives in [place] and younger son lives in [place]. She is close with her kids, all of her siblings (3 sisters and brother) and mother. She lives alone in an apartment in [place].    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     • lisdexamfetamine (VYVANSE) 50 MG capsule Take 1 Cap by mouth every morning for 30 days. 30 Cap 0   • buPROPion (WELLBUTRIN XL) 150 MG XL tablet Take 3 Tabs by mouth every morning. 90 Tab 1   • venlafaxine XR (EFFEXOR XR) 75 MG CAPSULE SR 24 HR Take 3 Caps by mouth every day. 90 Cap 1   • levothyroxine (SYNTHROID) 112 MCG Tab Take 112 mcg by mouth Every morning on an empty stomach.     • zolmitriptan (ZOMIG) 2.5 MG Tab Take 2.5 mg by mouth 1 time daily as needed for Headache.     • polymixin-trimethoprim (POLYTRIM) 42744-0.1 UNIT/ML-% Solution      • valacyclovir (VALTREX) 1 GM Tab Take 1,000 mg by mouth 2 times a day.     • therapeutic multivitamin-minerals (THERAGRAN-M) Tab Take 1 Tab by mouth every day.     • pseudoephedrine (SUDAFED) 30 MG Tab Take 60 mg by mouth every four hours as needed for Congestion.       No current facility-administered medications for this visit.        Review Of Systems:    Constitutional - Positive for fatigue  Respiratory - Negative for shortness of breath,  "cough  CVS - Negative for chest pain, palpitations  GI - Negative for nausea, vomiting, abdominal pain, diarrhea, constipation  Musculoskeletal - Positive for left knee pain  Neurological - Positive for headaches   Psychiatric - Positive for depression, anxiety    Physical Examination:  Vital signs: /73   Pulse 80   Ht 1.778 m (5' 10\")   Wt 73 kg (161 lb)   BMI 23.10 kg/m²     Musculoskeletal: Normal  gait. No abnormal movements.     Mental Status Evaluation:   General: Middle aged white female, dressed in casual attire, good grooming and hygiene, in no apparent distress, calm and cooperative, fair eye contact, psychomotor retardation  Orientation: Alert and oriented to person, place and time  Recent and remote memory: Grossly intact  Attention span and concentration: Grossly intact  Speech: Spontaneous, normal rate, rhythm and tone  Thought Process: Linear, logical and goal directed  Thought Content: Denies suicidal or homicidal ideations, intent or plan  Perception: Denies auditory or visual hallucinations. No delusions noted  Associations: Intact  Language: Appropriate  Fund of knowledge and vocabulary: Grossly adequate  Mood: \"am okay\"  Affect: Dysphoric at times, mood congruent  Insight: Good  Judgment: Good    Depression screening:  Depression Screen (PHQ-2/PHQ-9) 11/17/2017 4/4/2018   PHQ-2 Total Score 2 -   PHQ-2 Total Score - 6   PHQ-9 Total Score 6 -   PHQ-9 Total Score - 12       Interpretation of PHQ-9 Total Score   Score Severity   1-4 No Depression   5-9 Mild Depression   10-14 Moderate Depression   15-19 Moderately Severe Depression   20-27 Severe Depression    Medical Records/Labs/Diagnostic Tests Reviewed:  [facility]  records - appropriate refills, no abuse suspected       Impression:  1. Major depressive disorder, recurrent, mild - improved   2. Binge eating disorder, moderate - improved   3. Generalized anxiety disorder - improved     Plan:  1. Continue Effexor  mg in the morning for " depression and anxiety  2. Continue Wellbutrin  mg in the morning for depression and anxiety  3. Continue Klonopin 0.5 mg twice daily for anxiety and/or sleep. # 60 tabs with 2 refills prescription faxed to her pharmacy  4. Continue Vyvanse 50 mg in the morning for binge eating disorder. Provided her with 1 prescription for 30 tabs.  5. Continue individual psychotherapy with Mckenna Jimenez at [facility]     Return to clinic in 4 weeks or sooner if symptoms worsen    The proposed treatment plan was discussed with the patient who was provided the opportunity to ask questions and make suggestions regarding alternative treatment. Patient verbalized understanding and expressed agreement with the plan.     Opal Garcia M.D.  05/04/18    This note was created using voice recognition software (Dragon). The accuracy of the dictation is limited by the abilities of the software. I have reviewed the note prior to signing, however some errors in grammar and context are still possible. If you have any questions related to this note please do not hesitate to contact our office.

## 2018-06-04 ENCOUNTER — OFFICE VISIT (OUTPATIENT)
Dept: BEHAVIORAL HEALTH | Facility: PHYSICIAN GROUP | Age: 58
End: 2018-06-04
Payer: COMMERCIAL

## 2018-06-04 VITALS
BODY MASS INDEX: 23.48 KG/M2 | HEART RATE: 98 BPM | SYSTOLIC BLOOD PRESSURE: 128 MMHG | DIASTOLIC BLOOD PRESSURE: 83 MMHG | HEIGHT: 70 IN | WEIGHT: 164 LBS

## 2018-06-04 DIAGNOSIS — F33.0 MDD (MAJOR DEPRESSIVE DISORDER), RECURRENT EPISODE, MILD (HCC): ICD-10-CM

## 2018-06-04 DIAGNOSIS — F41.1 GAD (GENERALIZED ANXIETY DISORDER): ICD-10-CM

## 2018-06-04 DIAGNOSIS — F50.81 MODERATE BINGE-EATING DISORDER, IN PARTIAL REMISSION: ICD-10-CM

## 2018-06-04 PROBLEM — F50.814: Status: ACTIVE | Noted: 2017-04-07

## 2018-06-04 PROCEDURE — 99214 OFFICE O/P EST MOD 30 MIN: CPT | Performed by: PSYCHIATRY & NEUROLOGY

## 2018-06-04 PROCEDURE — 90833 PSYTX W PT W E/M 30 MIN: CPT | Performed by: PSYCHIATRY & NEUROLOGY

## 2018-06-04 RX ORDER — LISDEXAMFETAMINE DIMESYLATE CAPSULES 50 MG/1
50 CAPSULE ORAL EVERY MORNING
Qty: 30 CAP | Refills: 0 | Status: SHIPPED | OUTPATIENT
Start: 2018-06-23 | End: 2018-07-12 | Stop reason: SDUPTHER

## 2018-06-04 NOTE — PROGRESS NOTES
"PSYCHIATRY FOLLOW-UP NOTE      Chief Complaint   Patient presents with   • Follow-Up     depression, anxiety, eating disorder         History Of Present Illness:  Sammi Alberto is a 58 y.o. old female with major depressive disorder, hypothyroidism, s/p lap band surgery in 2010, parathyroidectomy, left ACL and meniscal repair comes in today for follow up, was last seen 4 weeks ago.  She continues to struggle with anxiety especially in regards to her breakup.  She continues to miss her ex-boyfriend a lot and finds it hard to continue her day-to-day life without him.  She does understand that he was extremely manipulative and selfish and the relationship that she has a hard time letting it go.  She continues to have unprovoked crying  spells.  She does not cook at home as she does not like to cook just for herself.  She is doing better in regards to her eating disorder.  She has not consumed candy or chocolate and months and recently finished an online fitness program.  She is looking forward to getting a dog so that she has reason to be physically active especially in the summertime.  She continues to work with a therapist in regards to her childhood which she feels has \"messed me as a grownup\".  Her mother continues to be in hospice care and she has mixed feelings about it.  She had a difficult relationship with her mother but does not want to open any new wounds at this time.  She continues to have good support from her friends and kids.  She has a hard time looking at all the good things in her life and tends to focus on the relationship that she has lost.  She has been doing good at her workplace and denies any work-related stressors.  She has been using less Klonopin than before.  She feels that 0.5 mg does not help her much and she needs a higher dose so she avoids taking it on a regular basis and uses it only when she needs it a lot.  Continues to be compliant with Effexor and Wellbutrin denies any side " effects.    Social History:   Employed and has 2 jobs. 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 She has been  and  ×1. She recently ended a relationship of over 4 years. She has 2 sons from her marriage who are in their 20s. Her older son lives in [place] and younger son lives in [place]. She is close with her kids, all of her siblings (3 sisters and brother) and mother. She lives alone in an apartment in [place].    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 30 Cap 0   • buPROPion (WELLBUTRIN XL) 150 MG XL tablet Take 3 Tabs by mouth every morning. 90 Tab 2   • venlafaxine XR (EFFEXOR XR) 75 MG CAPSULE SR 24 HR TAKE THREE CAPSULES BY MOUTH EVERY DAY 90 Cap 2   • zolmitriptan (ZOMIG) 2.5 MG Tab Take 2.5 mg by mouth 1 time daily as needed for Headache.     • clonazePAM (KLONOPIN) 0.5 MG Tab Take 1 Tab by mouth 2 Times a Day for 30 days. 60 Tab 2   • polymixin-trimethoprim (POLYTRIM) 48747-3.1 UNIT/ML-% Solution      • valacyclovir (VALTREX) 1 GM Tab Take 1,000 mg by mouth 2 times a day.     • therapeutic multivitamin-minerals (THERAGRAN-M) Tab Take 1 Tab by mouth every day.     • pseudoephedrine (SUDAFED) 30 MG Tab Take 60 mg by mouth every four hours as needed for Congestion.     • levothyroxine (SYNTHROID) 112 MCG Tab Take 112 mcg by mouth Every morning on an empty stomach.       No current facility-administered medications for this visit.        Review Of Systems:   "  Constitutional - Positive for fatigue  Respiratory - Negative for shortness of breath, cough  CVS - Negative for chest pain, palpitations  GI - Negative for nausea, vomiting, abdominal pain, diarrhea, constipation  Musculoskeletal - Positive for left knee pain  Neurological - Positive for headaches   Psychiatric - Positive for depression, anxiety    Physical Examination:  Vital signs: /83   Pulse 98   Ht 1.778 m (5' 10\")   Wt 74.4 kg (164 lb)   BMI 23.53 kg/m²     Musculoskeletal: Normal gait. No abnormal movements.     Mental Status Evaluation:   General: Middle aged white female, tearful at times, dressed in casual attire, good grooming and hygiene, in no apparent distress, calm and cooperative, fair eye contact, psychomotor retardation  Orientation: Alert and oriented to person, place and time  Recent and remote memory: Grossly intact  Attention span and concentration: Grossly intact  Speech: Spontaneous, normal rate, rhythm and tone  Thought Process: Linear, logical and goal directed  Thought Content: Denies suicidal or homicidal ideations, intent or plan  Perception: Denies auditory or visual hallucinations. No delusions noted  Associations: Intact  Language: Appropriate  Fund of knowledge and vocabulary: Grossly adequate  Mood: \"am fine\"  Affect: Dysphoric at times, mood congruent  Insight: Good  Judgment: Good    Depression screening:  Depression Screen (PHQ-2/PHQ-9) 11/17/2017 4/4/2018   PHQ-2 Total Score 2 -   PHQ-2 Total Score - 6   PHQ-9 Total Score 6 -   PHQ-9 Total Score - 12       Interpretation of PHQ-9 Total Score   Score Severity   1-4 No Depression   5-9 Mild Depression   10-14 Moderate Depression   15-19 Moderately Severe Depression   20-27 Severe Depression    Medical Records/Labs/Diagnostic Tests Reviewed:  [facility]  records - appropriate refills, no abuse suspected       Impression:  1. Major depressive disorder, recurrent, mild - stable   2. Binge eating disorder, moderate, in partial " remission - stable  3. Generalized anxiety disorder - stable     Plan:  1. Continue Effexor  mg in the morning for depression and anxiety  2. Continue Wellbutrin  mg in the morning for depression and anxiety  3. Continue Klonopin 0.5 mg twice daily for anxiety and/or sleep. Not refilled today.  4. Continue Vyvanse 50 mg in the morning for binge eating disorder. Provided her with 1 prescription for 30 tabs.  5. Continue individual psychotherapy with Mckenna Jimenez at [facility]   6. Provided supportive psychotherapy (> 16 minutes).  Discussed putting self as a priority and to do little things for herself like cooking rather than eating out.  7. Provided her with that of her emotional support animal.    Return to clinic in 4 weeks or sooner if symptoms worsen    The proposed treatment plan was discussed with the patient who was provided the opportunity to ask questions and make suggestions regarding alternative treatment. Patient verbalized understanding and expressed agreement with the plan.     Opal Garcia M.D.  06/04/18    This note was created using voice recognition software (Dragon). The accuracy of the dictation is limited by the abilities of the software. I have reviewed the note prior to signing, however some errors in grammar and context are still possible. If you have any questions related to this note please do not hesitate to contact our office.

## 2018-06-04 NOTE — LETTER
2018      Reg: Sammi Alberto ( 1960)      To whom it may concern,      This is to inform you that Sammi Alberto has been patient of mine since 2016 and is being treated for anxiety disorder and depressive disorder. I am familiar with her limitations and needs in order to function at an optimal level. She will benefit from having specific accommodations in order to decrease her stress and help her mental health. One of the accommodations is to have a  emotional support dog with her where she lives. Please allow Sammi Alberto to house her dog where she lives in order help her cope with her anxiety and depression.      Please feel free to contact me if there any further questions.      Sincerely,        Opal Garcia MD

## 2018-07-12 ENCOUNTER — OFFICE VISIT (OUTPATIENT)
Dept: BEHAVIORAL HEALTH | Facility: PHYSICIAN GROUP | Age: 58
End: 2018-07-12
Payer: COMMERCIAL

## 2018-07-12 VITALS — HEIGHT: 70 IN | SYSTOLIC BLOOD PRESSURE: 105 MMHG | DIASTOLIC BLOOD PRESSURE: 76 MMHG | HEART RATE: 85 BPM

## 2018-07-12 DIAGNOSIS — F50.81 MODERATE BINGE-EATING DISORDER, IN PARTIAL REMISSION: ICD-10-CM

## 2018-07-12 DIAGNOSIS — F41.1 GAD (GENERALIZED ANXIETY DISORDER): ICD-10-CM

## 2018-07-12 DIAGNOSIS — F33.41 MDD (MAJOR DEPRESSIVE DISORDER), RECURRENT, IN PARTIAL REMISSION (HCC): ICD-10-CM

## 2018-07-12 PROBLEM — F33.0 MDD (MAJOR DEPRESSIVE DISORDER), RECURRENT EPISODE, MILD (HCC): Status: RESOLVED | Noted: 2017-09-15 | Resolved: 2018-07-12

## 2018-07-12 PROCEDURE — 99214 OFFICE O/P EST MOD 30 MIN: CPT | Performed by: PSYCHIATRY & NEUROLOGY

## 2018-07-12 RX ORDER — LISDEXAMFETAMINE DIMESYLATE CAPSULES 50 MG/1
50 CAPSULE ORAL EVERY MORNING
Qty: 30 CAP | Refills: 0 | Status: SHIPPED | OUTPATIENT
Start: 2018-07-21 | End: 2018-08-20

## 2018-07-12 RX ORDER — CLONAZEPAM 0.5 MG/1
0.5 TABLET ORAL 2 TIMES DAILY
COMMUNITY
Start: 2018-07-08 | End: 2018-08-27 | Stop reason: SDUPTHER

## 2018-07-12 NOTE — PROGRESS NOTES
PSYCHIATRY FOLLOW-UP NOTE      Chief Complaint   Patient presents with   • Follow-Up     depression, anxiety, eating disorder         History Of Present Illness:  Sammi Alberto is a 58 y.o. old female with major depressive disorder, hypothyroidism, s/p lap band surgery in 2010, parathyroidectomy, left ACL and meniscal repair comes in today for follow up, was last seen 4 weeks ago.  She reports feeling better in regards to both her depression and anxiety since her last visit here.  She got a new puppy and she has been feeling good about it.  She feels needed since she got the dog and is finding a new purpose with him.  She has been more physically active with the dog and is enjoying that.  She had a brief relapse on eating candy but is feeling better now.  Denies any purging in the last few weeks.  She has reconnected with an old friend and has been spending a lot of time with him.  She feels that this friend has been really honest in regards to his expectations and she is happy to have some honesty in her life.  She is  thinking less about her ex-boyfriend and is getting less anxiety about that breakup.  Sleep and appetite have been good.  Denies any impairments in her workplace.  Denies anhedonia, has been enjoying spending time with her friend and her puppy and playing [employer] several days a week.  She is looking forward to a trip to [facility] in the fall season.  Continues to be compliant with all of her psychiatric medications and has noticed that she is taking less Klonopin than before.  Denies any side effects that she has noticed from her psychotropic medications.    Social History:   Employed and has 2 jobs. 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 She has been  and  ×1. She recently ended a relationship of over 4 years. She has 2 sons from her marriage who are in their 20s.  Her older son lives in [place] and younger son lives in [place]. She is close with her kids, all of her siblings (3 sisters and brother) and mother. She lives alone in an apartment in [place].    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 recent use, has been sober since the age of 16 after successfully completing an inpatient rehab in [place]  Nicotine - Denies  Illicit drugs - Denies    Past Medication Trials:  Prozac (ineffective), Nortriptyline     Medications:  Current Outpatient Prescriptions   Medication Sig Dispense Refill   • [START ON 7/21/2018] lisdexamfetamine (VYVANSE) 50 MG capsule Take 1 Cap by mouth every morning for 30 days. 30 Cap 0   • clonazePAM (KLONOPIN) 0.5 MG Tab Take 0.5 mg by mouth 2 Times a Day.     • buPROPion (WELLBUTRIN XL) 150 MG XL tablet Take 3 Tabs by mouth every morning. 90 Tab 2   • venlafaxine XR (EFFEXOR XR) 75 MG CAPSULE SR 24 HR TAKE THREE CAPSULES BY MOUTH EVERY DAY 90 Cap 2   • levothyroxine (SYNTHROID) 112 MCG Tab Take 112 mcg by mouth Every morning on an empty stomach.     • zolmitriptan (ZOMIG) 2.5 MG Tab Take 2.5 mg by mouth 1 time daily as needed for Headache.     • polymixin-trimethoprim (POLYTRIM) 35492-6.1 UNIT/ML-% Solution      • valacyclovir (VALTREX) 1 GM Tab Take 1,000 mg by mouth 2 times a day.     • therapeutic multivitamin-minerals (THERAGRAN-M) Tab Take 1 Tab by mouth every day.     • pseudoephedrine (SUDAFED) 30 MG Tab Take 60 mg by mouth every four hours as needed for Congestion.       No current facility-administered medications for this visit.        Review Of Systems:    Constitutional - Positive for fatigue  Respiratory - Negative for shortness of breath, cough  CVS - Negative for chest pain, palpitations  GI - Negative for nausea, vomiting, abdominal pain, diarrhea, constipation  Musculoskeletal - Positive for left knee pain  Neurological - Positive for headaches   Psychiatric - Positive for depression, anxiety    Physical Examination:  Vital  "signs: /76   Pulse 85   Ht 1.778 m (5' 10\")     Musculoskeletal: Normal gait. No abnormal movements.     Mental Status Evaluation:   General: Middle aged white female, dressed in casual attire, good grooming and hygiene, in no apparent distress, calm and cooperative, good eye contact, no psychomotor agitation or retardation  Orientation: Alert and oriented to person, place and time  Recent and remote memory: Grossly intact  Attention span and concentration: Grossly intact  Speech: Spontaneous, normal rate, rhythm and tone  Thought Process: Linear, logical and goal directed  Thought Content: Denies suicidal or homicidal ideations, intent or plan  Perception: Denies auditory or visual hallucinations. No delusions noted  Associations: Intact  Language: Appropriate  Fund of knowledge and vocabulary: Grossly adequate  Mood: \"am feeling okay\"  Affect: Euthymic, mood congruent  Insight: Good  Judgment: Good    Depression screening:  Depression Screen (PHQ-2/PHQ-9) 11/17/2017 4/4/2018   PHQ-2 Total Score 2 -   PHQ-2 Total Score - 6   PHQ-9 Total Score 6 -   PHQ-9 Total Score - 12       Interpretation of PHQ-9 Total Score   Score Severity   1-4 No Depression   5-9 Mild Depression   10-14 Moderate Depression   15-19 Moderately Severe Depression   20-27 Severe Depression    Medical Records/Labs/Diagnostic Tests Reviewed:  [facility]  records - appropriate refills, no abuse suspected       Impression:  1. Major depressive disorder, recurrent, in partial remission - improved   2. Binge eating disorder, moderate, in partial remission - stable  3. Generalized anxiety disorder - improved     Plan:  1. Continue Effexor  mg and Wellbutrin  mg in the morning for depression and anxiety  2. Continue Klonopin 0.5 mg twice daily for anxiety and/or sleep. Not refilled today.  3. Continue Vyvanse 50 mg in the morning for binge eating disorder. Provided her with 1 prescription for 30 tabs.  4. Continue individual psychotherapy " with Mckenna Jimenez at [facility]     Return to clinic in 4 weeks or sooner if symptoms worsen    The proposed treatment plan was discussed with the patient who was provided the opportunity to ask questions and make suggestions regarding alternative treatment. Patient verbalized understanding and expressed agreement with the plan.     Opal Garcia M.D.  07/12/18    This note was created using voice recognition software (Dragon). The accuracy of the dictation is limited by the abilities of the software. I have reviewed the note prior to signing, however some errors in grammar and context are still possible. If you have any questions related to this note please do not hesitate to contact our office.

## 2018-07-13 ENCOUNTER — APPOINTMENT (OUTPATIENT)
Dept: BEHAVIORAL HEALTH | Facility: PHYSICIAN GROUP | Age: 58
End: 2018-07-13
Payer: COMMERCIAL

## 2018-08-10 ENCOUNTER — APPOINTMENT (OUTPATIENT)
Dept: BEHAVIORAL HEALTH | Facility: PHYSICIAN GROUP | Age: 58
End: 2018-08-10
Payer: COMMERCIAL

## 2018-08-17 ENCOUNTER — APPOINTMENT (OUTPATIENT)
Dept: BEHAVIORAL HEALTH | Facility: PHYSICIAN GROUP | Age: 58
End: 2018-08-17
Payer: COMMERCIAL

## 2018-08-27 ENCOUNTER — OFFICE VISIT (OUTPATIENT)
Dept: BEHAVIORAL HEALTH | Facility: PHYSICIAN GROUP | Age: 58
End: 2018-08-27
Payer: COMMERCIAL

## 2018-08-27 VITALS
HEIGHT: 70 IN | WEIGHT: 170 LBS | SYSTOLIC BLOOD PRESSURE: 139 MMHG | BODY MASS INDEX: 24.34 KG/M2 | HEART RATE: 85 BPM | DIASTOLIC BLOOD PRESSURE: 82 MMHG

## 2018-08-27 DIAGNOSIS — F41.1 GAD (GENERALIZED ANXIETY DISORDER): ICD-10-CM

## 2018-08-27 DIAGNOSIS — F50.81 BINGE-EATING DISORDER, MODERATE: ICD-10-CM

## 2018-08-27 DIAGNOSIS — Z63.4 BEREAVEMENT: ICD-10-CM

## 2018-08-27 DIAGNOSIS — F33.1 MDD (MAJOR DEPRESSIVE DISORDER), RECURRENT EPISODE, MODERATE (HCC): ICD-10-CM

## 2018-08-27 PROCEDURE — 99214 OFFICE O/P EST MOD 30 MIN: CPT | Performed by: PSYCHIATRY & NEUROLOGY

## 2018-08-27 RX ORDER — LISDEXAMFETAMINE DIMESYLATE 50 MG
50 CAPSULE ORAL EVERY MORNING
COMMUNITY
Start: 2018-07-21 | End: 2018-08-27

## 2018-08-27 RX ORDER — BUPROPION HYDROCHLORIDE 150 MG/1
450 TABLET ORAL EVERY MORNING
Qty: 90 TAB | Refills: 0 | Status: SHIPPED | OUTPATIENT
Start: 2018-08-27 | End: 2018-09-18 | Stop reason: SDUPTHER

## 2018-08-27 RX ORDER — CLONAZEPAM 0.5 MG/1
0.5 TABLET ORAL 2 TIMES DAILY
Qty: 60 TAB | Refills: 0 | Status: SHIPPED | OUTPATIENT
Start: 2018-08-27 | End: 2018-09-18 | Stop reason: SDUPTHER

## 2018-08-27 RX ORDER — SERTRALINE HYDROCHLORIDE 100 MG/1
TABLET, FILM COATED ORAL
Qty: 60 TAB | Refills: 0 | Status: SHIPPED | OUTPATIENT
Start: 2018-08-27 | End: 2018-09-18 | Stop reason: SDUPTHER

## 2018-08-27 RX ORDER — LISDEXAMFETAMINE DIMESYLATE CAPSULES 60 MG/1
60 CAPSULE ORAL EVERY MORNING
Qty: 30 CAP | Refills: 0 | Status: SHIPPED | OUTPATIENT
Start: 2018-08-27 | End: 2018-09-18 | Stop reason: SDUPTHER

## 2018-08-27 SDOH — SOCIAL STABILITY - SOCIAL INSECURITY: DISSAPEARANCE AND DEATH OF FAMILY MEMBER: Z63.4

## 2018-08-27 NOTE — PATIENT INSTRUCTIONS
- Continue Effexor XR 75 mg (1 capsule) in the morning x 3 days and then stop  - Start Zoloft 100 mg (1 tablet) daily x 1 week and then increase to 200 mg (2 tablets) daily  - Continue Wellbutrin  mg (3 tablets) in the morning   - Increase Vyavnse 60 mg in the morning   - Continue Klonopin 0.5 mg twice daily for anxiety

## 2018-08-27 NOTE — PROGRESS NOTES
PSYCHIATRY FOLLOW-UP NOTE      Chief Complaint   Patient presents with   • Other     candy binge eating   • Follow-Up     depression, anxiety         History Of Present Illness:  Sammi Alberto is a 58 y.o. old female with major depressive disorder, hypothyroidism, s/p lap band surgery in , parathyroidectomy, left ACL and meniscal repair comes in today for follow up, was last seen 6 weeks ago.  He was worsening depression, anxiety and eating disorder symptoms since her last visit here.  She has gained over 5 pounds in a month and is feeling extremely guilty about her eating behaviors.  She has been eating bags of candy along with cookies and other sugary foods on a daily basis.  She is having a difficult time controlling her impulses and does not feel good about it.  She is in a new relationship and is feeling good about it but is not sure about how long this relationship would last.  She does not want to get too serious in this relationship but is having a nice time with him for now.  She is also having some psychosocial stressors at her apartment regarding her therapy dog.  She also lost her mother last week and is unsure how it is affecting her.  She was tearful for most of her appointment today.  She had a plan to visit her and her siblings for the Labor Day weekend but she canceled that trip.  Her mother did not want a  service but she is planning a trip to [place] in October with her brother.  Her mother's ashes will be spread.  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  She has been struggling with lack of motivation, irritability and fatigue.  Sleep has been okay.  She feels a lot more anxious and at times feels that  the Klonopin is not helping her anymore.  For the last month or so and has been using her triptan on a much regular basis.  Denies having thoughts of wanting to hurt herself or others.  She has been compliant with all of her psychiatric medications and denies any  side effects.  She has also noticed having daily headaches    Social History:   Employed and has 2 jobs. 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 She has been  and  ×1. She recently started a new relationship after ending a four-year long relationship earlier this year. She has 2 sons from her marriage who are in their 20s. Her older son lives in [place] and younger son lives in [place]. She is close with her kids and her siblings (3 sisters and brother) She lives alone in an apartment in [place].    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 30 Cap 0   • clonazePAM (KLONOPIN) 0.5 MG Tab Take 1 Tab by mouth 2 Times a Day for 30 days. 60 Tab 0   • sertraline (ZOLOFT) 100 MG Tab Take 100 mg daily x 1 week and then increase to 200 mg daily 60 Tab 0   • buPROPion (WELLBUTRIN XL) 150 MG XL tablet Take 3 Tabs by mouth every morning. 90 Tab 0   • levothyroxine (SYNTHROID) 112 MCG Tab Take 112 mcg by mouth Every morning on an empty stomach.     • zolmitriptan (ZOMIG) 2.5 MG Tab Take 2.5 mg by mouth 1 time daily as needed for Headache.     • polymixin-trimethoprim (POLYTRIM) 97928-4.1 UNIT/ML-% Solution      • valacyclovir (VALTREX) 1 GM Tab Take 1,000 mg by mouth 2 times a day.     • therapeutic multivitamin-minerals (THERAGRAN-M) Tab Take 1 Tab by mouth every day.     • pseudoephedrine (SUDAFED) 30 MG Tab Take 60 mg by mouth every four hours as needed for Congestion.    "    No current facility-administered medications for this visit.        Review Of Systems:    Constitutional - Positive for fatigue, increased appetite, weight gain  Respiratory - Negative for shortness of breath, cough  CVS - Negative for chest pain, palpitations  GI - Negative for nausea, vomiting, abdominal pain, diarrhea, constipation  Musculoskeletal - Negative for back pain  Neurological - Positive for headaches   Psychiatric - Positive for depression, anxiety, binge eating     Physical Examination:  Vital signs: /82   Pulse 85   Ht 1.778 m (5' 10\")   Wt 77.1 kg (170 lb)   BMI 24.39 kg/m²     Musculoskeletal: Normal gait. No abnormal movements.     Mental Status Evaluation:   General: Middle aged white female, tearful, dressed in casual attire, good grooming and hygiene, in no apparent distress, calm and cooperative, intermittent eye contact, psychomotor retardation  Orientation: Alert and oriented to person, place and time  Recent and remote memory: Grossly intact  Attention span and concentration: Grossly intact  Speech: Spontaneous, normal rate, rhythm and tone  Thought Process: Linear, logical and goal directed  Thought Content: Denies suicidal or homicidal ideations, intent or plan  Perception: Denies auditory or visual hallucinations. No delusions noted  Associations: Intact  Language: Appropriate  Fund of knowledge and vocabulary: Grossly adequate  Mood: \"okay\"  Affect: Dysphoric, mood congruent  Insight: Good  Judgment: Good    Depression screening:  Depression Screen (PHQ-2/PHQ-9) 11/17/2017 4/4/2018   PHQ-2 Total Score 2 -   PHQ-2 Total Score - 6   PHQ-9 Total Score 6 -   PHQ-9 Total Score - 12     Interpretation of PHQ-9 Total Score   Score Severity   1-4 No Depression   5-9 Mild Depression   10-14 Moderate Depression   15-19 Moderately Severe Depression   20-27 Severe Depression    Medical Records/Labs/Diagnostic Tests Reviewed:  [facility]  records - appropriate refills, no abuse suspected "       Impression:  1. Major depressive disorder, recurrent, moderate - worsening   2. Binge eating disorder, moderate - worsening  3. Generalized anxiety disorder - worsening   4. Bereavement (mother  18) - new problem    Plan:  1.  Taper down Effexor XR to 75 mg for 3 days and then discontinue  2.  Start Zoloft 100 mg daily for 1 week and then increase to 200 mg for depression, anxiety and eating disorder  3.  Continue Wellbutrin  mg daily for depression and anxiety  4.  Increase Vyvanse to 60 mg in the morning for binge eating disorder.  Provided her with one prescription for 30 tabs  5.  Continue Klonopin 0.5 mg twice daily for anxiety.  Provided her with one prescription for 60 tabs  6. Continue individual psychotherapy with Mckenna Jimenez at [facility]     Return to clinic in 3-4 weeks or sooner if symptoms worsen    The proposed treatment plan was discussed with the patient who was provided the opportunity to ask questions and make suggestions regarding alternative treatment. Patient verbalized understanding and expressed agreement with the plan.     pOal Garcia M.D.  18    This note was created using voice recognition software (Dragon). The accuracy of the dictation is limited by the abilities of the software. I have reviewed the note prior to signing, however some errors in grammar and context are still possible. If you have any questions related to this note please do not hesitate to contact our office.

## 2018-09-18 ENCOUNTER — OFFICE VISIT (OUTPATIENT)
Dept: BEHAVIORAL HEALTH | Facility: PHYSICIAN GROUP | Age: 58
End: 2018-09-18
Payer: COMMERCIAL

## 2018-09-18 VITALS
SYSTOLIC BLOOD PRESSURE: 141 MMHG | HEIGHT: 70 IN | HEART RATE: 77 BPM | DIASTOLIC BLOOD PRESSURE: 84 MMHG | BODY MASS INDEX: 23.77 KG/M2 | WEIGHT: 166 LBS

## 2018-09-18 DIAGNOSIS — F50.81 BINGE-EATING DISORDER, MODERATE: ICD-10-CM

## 2018-09-18 DIAGNOSIS — F33.1 MDD (MAJOR DEPRESSIVE DISORDER), RECURRENT EPISODE, MODERATE (HCC): ICD-10-CM

## 2018-09-18 DIAGNOSIS — F41.1 GAD (GENERALIZED ANXIETY DISORDER): ICD-10-CM

## 2018-09-18 DIAGNOSIS — Z63.4 BEREAVEMENT: ICD-10-CM

## 2018-09-18 PROCEDURE — 99214 OFFICE O/P EST MOD 30 MIN: CPT | Performed by: PSYCHIATRY & NEUROLOGY

## 2018-09-18 RX ORDER — LISDEXAMFETAMINE DIMESYLATE CAPSULES 60 MG/1
60 CAPSULE ORAL EVERY MORNING
Qty: 30 CAP | Refills: 0 | Status: SHIPPED | OUTPATIENT
Start: 2018-10-25 | End: 2018-11-05 | Stop reason: SDUPTHER

## 2018-09-18 RX ORDER — LISDEXAMFETAMINE DIMESYLATE CAPSULES 60 MG/1
60 CAPSULE ORAL EVERY MORNING
Qty: 30 CAP | Refills: 0 | Status: SHIPPED | OUTPATIENT
Start: 2018-09-26 | End: 2018-10-26

## 2018-09-18 RX ORDER — LISDEXAMFETAMINE DIMESYLATE CAPSULES 60 MG/1
60 CAPSULE ORAL EVERY MORNING
Qty: 30 CAP | Refills: 0 | Status: SHIPPED | OUTPATIENT
Start: 2018-11-23 | End: 2018-12-11 | Stop reason: SDUPTHER

## 2018-09-18 RX ORDER — BUPROPION HYDROCHLORIDE 150 MG/1
450 TABLET ORAL EVERY MORNING
Qty: 90 TAB | Refills: 2 | Status: SHIPPED | OUTPATIENT
Start: 2018-09-18 | End: 2018-12-11 | Stop reason: SDUPTHER

## 2018-09-18 RX ORDER — SERTRALINE HYDROCHLORIDE 100 MG/1
200 TABLET, FILM COATED ORAL DAILY
Qty: 60 TAB | Refills: 2 | Status: SHIPPED | OUTPATIENT
Start: 2018-09-18 | End: 2018-12-11

## 2018-09-18 RX ORDER — CLONAZEPAM 0.5 MG/1
0.5 TABLET ORAL 2 TIMES DAILY PRN
Qty: 60 TAB | Refills: 2 | Status: SHIPPED | OUTPATIENT
Start: 2018-09-25 | End: 2018-10-25

## 2018-09-18 SDOH — SOCIAL STABILITY - SOCIAL INSECURITY: DISSAPEARANCE AND DEATH OF FAMILY MEMBER: Z63.4

## 2018-09-18 NOTE — PROGRESS NOTES
PSYCHIATRY FOLLOW-UP NOTE      Chief Complaint   Patient presents with   • Depression   • Anxiety         History Of Present Illness:  Sammi Alberto is a 58 y.o. old female with major depressive disorder, hypothyroidism, s/p lap band surgery in 2010, parathyroidectomy, left ACL and meniscal repair comes in today for follow up, was last seen 3 weeks ago.  She reports feeling the same in regards to her depression and anxiety since her last visit here.  She continues to miss her mother a lot and has been crying thinking about her.  She has been keeping in touch with her siblings and is planning a trip to [place] to visit her grave along with her siblings.  She has been spending time with her boyfriend and is happy to have her support.  She has also been taking care of her pet and is happy that she has something to look forward to every day.  She was able to cross taper from Effexor to Zoloft but has not noticed any big difference with Zoloft so far.  She continues to be compliant with her the psychiatric medications as well.  She is using Klonopin on an as-needed basis but denies using more than the prescribed amount.  She has noticed an improvement in her eating patterns with the higher dose of Vyvanse.  She is binging less frequently on candy and has lost 4 pounds in the last 1 month.  She has definitely noticed decrease in her appetite with it.  Sleep has been good although she has been having a lot of dreams about her ex-boyfriend.  She is not in touch with him anymore and is happy to be in a relationship.  She denies any reckless or impulsive behaviors.  She has been working and denies missing any work because of her symptoms.  Denies having thoughts of wanting to hurt herself or others.    Social History:   Employed and has 2 jobs. 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 She has been  and   ×1. She recently started a new relationship after ending a four-year long relationship earlier this year. She has 2 sons from her marriage who are in their 20s. Her older son lives in [place] and younger son lives in [place]. She is close with her kids and her siblings (3 sisters and brother). She lives alone in an apartment in [place].    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 30 Cap 0   • [START ON 10/25/2018] Lisdexamfetamine Dimesylate 60 MG Cap Take 1 Cap by mouth every morning for 30 days. 30 Cap 0   • sertraline (ZOLOFT) 100 MG Tab Take 2 Tabs by mouth every day. 60 Tab 2   • buPROPion (WELLBUTRIN XL) 150 MG XL tablet Take 3 Tabs by mouth every morning. 90 Tab 2   • [START ON 9/25/2018] clonazePAM (KLONOPIN) 0.5 MG Tab Take 1 Tab by mouth 2 times a day as needed for up to 30 days. 60 Tab 2   • zolmitriptan (ZOMIG) 2.5 MG Tab Take 2.5 mg by mouth 1 time daily as needed for Headache.     • polymixin-trimethoprim (POLYTRIM) 80344-0.1 UNIT/ML-% Solution      • valacyclovir (VALTREX) 1 GM Tab Take 1,000 mg by mouth 2 times a day.     • therapeutic multivitamin-minerals (THERAGRAN-M) Tab Take 1 Tab by mouth every day.     • pseudoephedrine (SUDAFED) 30 MG Tab Take 60 mg by mouth every four hours as needed for Congestion.     • levothyroxine (SYNTHROID) 112 MCG Tab Take 112 mcg by mouth Every morning on an empty stomach.       No current facility-administered medications for this visit.        Review Of Systems:    Constitutional - Positive for  "fatigue  Respiratory - Negative for shortness of breath, cough  CVS - Negative for chest pain, palpitations  GI - Negative for nausea, vomiting, abdominal pain, diarrhea, constipation  Musculoskeletal - Negative for back pain  Neurological - Positive for headaches   Psychiatric - Positive for depression, anxiety    Physical Examination:  Vital signs: /84   Pulse 77   Ht 1.778 m (5' 10\")   Wt 75.3 kg (166 lb)   BMI 23.82 kg/m²     Musculoskeletal: Normal gait. No abnormal movements.     Mental Status Evaluation:   General: Middle aged white female, tearful, dressed in casual attire, good grooming and hygiene, in no apparent distress, calm and cooperative, intermittent eye contact, psychomotor retardation  Orientation: Alert and oriented to person, place and time  Recent and remote memory: Grossly intact  Attention span and concentration: Grossly intact  Speech: Spontaneous, normal rate, rhythm and tone  Thought Process: Linear, logical and goal directed  Thought Content: Denies suicidal or homicidal ideations, intent or plan  Perception: Denies auditory or visual hallucinations. No delusions noted  Associations: Intact  Language: Appropriate  Fund of knowledge and vocabulary: Grossly adequate  Mood: \"fine, I guess\"  Affect: Dysphoric, mood congruent  Insight: Good  Judgment: Good    Depression screening:  Depression Screen (PHQ-2/PHQ-9) 11/17/2017 4/4/2018   PHQ-2 Total Score 2 -   PHQ-2 Total Score - 6   PHQ-9 Total Score 6 -   PHQ-9 Total Score - 12     Interpretation of PHQ-9 Total Score   Score Severity   1-4 No Depression   5-9 Mild Depression   10-14 Moderate Depression   15-19 Moderately Severe Depression   20-27 Severe Depression    Medical Records/Labs/Diagnostic Tests Reviewed:  [facility]  records - appropriate refills, no abuse suspected       Impression:  1. Major depressive disorder, recurrent, moderate - stable  2. Binge eating disorder, moderate - improved   3. Generalized anxiety disorder - " stable  4. Bereavement (mother  18) - worsening    Plan:  1.  Continue Zoloft 200 mg daily and Wellbutrin  mg daily for depression, anxiety and eating disorder  2.  Continue Vyvanse 60 mg in the morning for binge eating disorder.  Provided her with 3 prescription for 30 tabs each.  3.  Continue Klonopin 0.5 mg twice daily as needed for anxiety.  Provided her with one prescription for 60 tabs and 2 refills.  4.  Continue individual psychotherapy with Mckenna Jimenez at [facility]     Return to clinic in 2 months or sooner if symptoms worsen    The proposed treatment plan was discussed with the patient who was provided the opportunity to ask questions and make suggestions regarding alternative treatment. Patient verbalized understanding and expressed agreement with the plan.     Opal Garcia M.D.  18    This note was created using voice recognition software (Dragon). The accuracy of the dictation is limited by the abilities of the software. I have reviewed the note prior to signing, however some errors in grammar and context are still possible. If you have any questions related to this note please do not hesitate to contact our office.

## 2018-11-05 ENCOUNTER — DOCUMENTATION (OUTPATIENT)
Dept: BEHAVIORAL HEALTH | Facility: CLINIC | Age: 58
End: 2018-11-05

## 2018-11-05 DIAGNOSIS — F50.81 BINGE-EATING DISORDER, MODERATE: ICD-10-CM

## 2018-11-05 RX ORDER — LISDEXAMFETAMINE DIMESYLATE CAPSULES 60 MG/1
60 CAPSULE ORAL EVERY MORNING
Qty: 30 CAP | Refills: 0 | Status: SHIPPED | OUTPATIENT
Start: 2018-11-11 | End: 2018-11-05 | Stop reason: SDUPTHER

## 2018-11-05 RX ORDER — LISDEXAMFETAMINE DIMESYLATE CAPSULES 60 MG/1
60 CAPSULE ORAL EVERY MORNING
Qty: 30 CAP | Refills: 0 | Status: SHIPPED | OUTPATIENT
Start: 2018-11-11 | End: 2018-11-06 | Stop reason: SDUPTHER

## 2018-11-06 DIAGNOSIS — F50.81 BINGE-EATING DISORDER, MODERATE: ICD-10-CM

## 2018-11-06 RX ORDER — LISDEXAMFETAMINE DIMESYLATE CAPSULES 60 MG/1
60 CAPSULE ORAL EVERY MORNING
Qty: 30 CAP | Refills: 0 | Status: SHIPPED | OUTPATIENT
Start: 2018-11-11 | End: 2018-12-11

## 2018-12-11 ENCOUNTER — OFFICE VISIT (OUTPATIENT)
Dept: BEHAVIORAL HEALTH | Facility: CLINIC | Age: 58
End: 2018-12-11

## 2018-12-11 VITALS
HEART RATE: 80 BPM | DIASTOLIC BLOOD PRESSURE: 69 MMHG | WEIGHT: 161 LBS | HEIGHT: 70 IN | SYSTOLIC BLOOD PRESSURE: 116 MMHG | BODY MASS INDEX: 23.05 KG/M2

## 2018-12-11 DIAGNOSIS — F33.2 MDD (MAJOR DEPRESSIVE DISORDER), RECURRENT SEVERE, WITHOUT PSYCHOSIS (HCC): ICD-10-CM

## 2018-12-11 DIAGNOSIS — F50.81 BINGE-EATING DISORDER, MODERATE: ICD-10-CM

## 2018-12-11 DIAGNOSIS — Z63.4 BEREAVEMENT: ICD-10-CM

## 2018-12-11 DIAGNOSIS — F41.1 GAD (GENERALIZED ANXIETY DISORDER): ICD-10-CM

## 2018-12-11 PROCEDURE — 99214 OFFICE O/P EST MOD 30 MIN: CPT | Performed by: PSYCHIATRY & NEUROLOGY

## 2018-12-11 RX ORDER — ESCITALOPRAM OXALATE 20 MG/1
20 TABLET ORAL DAILY
Qty: 90 TAB | Refills: 0 | Status: SHIPPED | OUTPATIENT
Start: 2018-12-11 | End: 2019-01-11

## 2018-12-11 RX ORDER — OFLOXACIN 3 MG/ML
SOLUTION/ DROPS OPHTHALMIC
COMMUNITY
Start: 2018-11-15 | End: 2019-04-19

## 2018-12-11 RX ORDER — PREDNISOLONE ACETATE 10 MG/ML
SUSPENSION/ DROPS OPHTHALMIC
COMMUNITY
Start: 2018-11-17 | End: 2019-04-19

## 2018-12-11 RX ORDER — KETOROLAC TROMETHAMINE 5 MG/ML
SOLUTION OPHTHALMIC
COMMUNITY
Start: 2018-11-21 | End: 2019-04-19

## 2018-12-11 RX ORDER — MOXIFLOXACIN 5 MG/ML
SOLUTION/ DROPS OPHTHALMIC
COMMUNITY
Start: 2018-11-14 | End: 2019-04-19

## 2018-12-11 RX ORDER — BUPROPION HYDROCHLORIDE 150 MG/1
450 TABLET ORAL EVERY MORNING
Qty: 90 TAB | Refills: 2 | Status: SHIPPED | OUTPATIENT
Start: 2018-12-11 | End: 2019-03-27 | Stop reason: SDUPTHER

## 2018-12-11 RX ORDER — LISDEXAMFETAMINE DIMESYLATE CAPSULES 60 MG/1
60 CAPSULE ORAL EVERY MORNING
Qty: 30 CAP | Refills: 0 | Status: SHIPPED | OUTPATIENT
Start: 2018-12-11 | End: 2019-01-10

## 2018-12-11 RX ORDER — CLONAZEPAM 0.5 MG/1
0.25 TABLET ORAL 2 TIMES DAILY PRN
Qty: 60 TAB | Refills: 0 | Status: SHIPPED | OUTPATIENT
Start: 2018-12-11 | End: 2019-01-10

## 2018-12-11 SDOH — SOCIAL STABILITY - SOCIAL INSECURITY: DISSAPEARANCE AND DEATH OF FAMILY MEMBER: Z63.4

## 2018-12-11 ASSESSMENT — PATIENT HEALTH QUESTIONNAIRE - PHQ9
5. POOR APPETITE OR OVEREATING: 0 - NOT AT ALL
SUM OF ALL RESPONSES TO PHQ QUESTIONS 1-9: 16
CLINICAL INTERPRETATION OF PHQ2 SCORE: 6

## 2018-12-11 NOTE — PROGRESS NOTES
PSYCHIATRY FOLLOW-UP NOTE      Chief Complaint   Patient presents with   • Depression         History Of Present Illness:  Sammi Alberto is a 58 y.o. old female with major depressive disorder, hypothyroidism, s/p lap band surgery in 2010, parathyroidectomy, left ACL and meniscal repair comes in today for follow up, was last seen over 2 months ago.  She reports still struggling with depression since her last visit with me.  She did recently took a trip to [facility] which went well and she got back home last week.  She is struggling with depression on a day-to-day basis.  She is having crying spells and struggling with lack of motivation and fatigue.  She has been doing good in regards to her eating and continues to be compliant with Vyvanse.  She feels that her anxiety is doing good as well but her depression seems pretty overwhelming at this time.  She has not been able to feel better since she broke up with her boyfriend a few months ago.  She still thinks about him on a daily basis and is having a hard time imagining her life without him.  She has been in any relationship but she knows that there is no future to this relationship and she continues to miss her ex-boyfriend.  Sleep has been fine.  She has been taking her dog out for walks but feels that it is a chore.  She has been able to do the bare minimum at her workplace but is not enjoying her job like before.  She is having some passive thoughts of death and feels okay not waking up tomorrow but denies having active thoughts, intent or plan of wanting to hurt herself or anybody else.    Social History:   She is currently in a casual relationship.  She ended a 4-year long relationship earlier this year.  She has been  and  x1 and has 2 sons from that marriage who are in their 20s.  Her older son lives in [place] and younger son lives in [place].  She is close with both her kids and her siblings -3 sisters and a brother.  She lives alone  in an apartment in [place].  She is employed and has 2 jobs.  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 90 Tab 2   • Lisdexamfetamine Dimesylate 60 MG Cap Take 1 Cap by mouth every morning for 30 days. 30 Cap 0   • escitalopram (LEXAPRO) 20 MG tablet Take 1 Tab by mouth every day. 90 Tab 0   • clonazePAM (KLONOPIN) 0.5 MG Tab Take 0.5 Tabs by mouth 2 times a day as needed (anxiety) for up to 30 days. 60 Tab 0   • ketorolac (ACULAR) 0.5 % Solution      • moxifloxacin (VIGAMOX) 0.5 % Solution      • ofloxacin (OCUFLOX) 0.3 % Solution      • prednisoLONE acetate (PRED FORTE) 1 % Suspension      • Lisdexamfetamine Dimesylate 60 MG Cap Take 1 Cap by mouth every morning for 30 days. 30 Cap 0   • zolmitriptan (ZOMIG) 2.5 MG Tab Take 2.5 mg by mouth 1 time daily as needed for Headache.     • polymixin-trimethoprim (POLYTRIM) 17959-4.1 UNIT/ML-% Solution      • valacyclovir (VALTREX) 1 GM Tab Take 1,000 mg by mouth 2 times a day.     • therapeutic multivitamin-minerals (THERAGRAN-M) Tab Take 1 Tab by mouth every day.     • pseudoephedrine (SUDAFED) 30 MG Tab Take 60 mg by mouth every four hours as needed for Congestion.     • levothyroxine (SYNTHROID) 112 MCG Tab Take 112 mcg by mouth Every morning on an empty stomach.       No current facility-administered medications for this visit.        Review Of Systems:   "  Constitutional - Positive for fatigue  Respiratory - Negative for shortness of breath, cough  CVS - Negative for chest pain, palpitations  GI - Negative for nausea, vomiting, abdominal pain, diarrhea, constipation  Musculoskeletal - Negative for back pain  Neurological - Negative for headaches   Psychiatric - Positive for depression, anxiety    Physical Examination:  Vital signs: /69   Pulse 80   Ht 1.778 m (5' 10\")   Wt 73 kg (161 lb)   BMI 23.10 kg/m²     Musculoskeletal: Normal gait. No abnormal movements.     Mental Status Evaluation:   General: Middle aged white female, tearful, dressed in casual attire, good grooming and hygiene, in no apparent distress, calm and cooperative, intermittent eye contact, psychomotor retardation  Orientation: Alert and oriented to person, place and time  Recent and remote memory: Grossly intact  Attention span and concentration: Grossly intact  Speech: Spontaneous, normal rate, rhythm and tone  Thought Process: Linear, logical and goal directed  Thought Content: Denies suicidal or homicidal ideations, intent or plan  Perception: Denies auditory or visual hallucinations. No delusions noted  Associations: Intact  Language: Appropriate  Fund of knowledge and vocabulary: Grossly adequate  Mood: \"not good\"  Affect: Dysphoric, mood congruent  Insight: Good  Judgment: Good    Depression screening:  Depression Screen (PHQ-2/PHQ-9) 11/17/2017 4/4/2018 12/11/2018   PHQ-2 Total Score 2 - -   PHQ-2 Total Score - 6 6   PHQ-9 Total Score 6 - -   PHQ-9 Total Score - 12 16     Interpretation of PHQ-9 Total Score   Score Severity   1-4 No Depression   5-9 Mild Depression   10-14 Moderate Depression   15-19 Moderately Severe Depression   20-27 Severe Depression    Medical Records/Labs/Diagnostic Tests Reviewed:  [facility]  records - appropriate refills, no abuse suspected       Impression:  1. Major depressive disorder, recurrent, severe - worsening   2. Binge eating disorder, moderate - " stable   3. Generalized anxiety disorder - stable  4. Bereavement (mother  18) - stable    Plan:  1.  Continue Zoloft as patient has not noticed any improvement in her mood with Zoloft  2.  Start Lexapro 20 mg daily for depression and anxiety  3.  Continue Wellbutrin  mg daily for depression, anxiety and eating disorder  4.  Continue Vyvanse 60 mg in the morning for binge eating disorder.  Provided her with one prescription for 30 tabs.  5.  Continue Klonopin 0.5 mg twice daily as needed for anxiety.  Provided her one prescription for 60 tabs and no refills.  6.  Refer to intensive outpatient program  7.  Continue individual psychotherapy with Mckenna Jimenez at [facility]     Return to clinic in 4 weeks or sooner if symptoms worsen    The proposed treatment plan was discussed with the patient who was provided the opportunity to ask questions and make suggestions regarding alternative treatment. Patient verbalized understanding and expressed agreement with the plan.     Opal Garcia M.D.  18    This note was created using voice recognition software (Dragon). The accuracy of the dictation is limited by the abilities of the software. I have reviewed the note prior to signing, however some errors in grammar and context are still possible. If you have any questions related to this note please do not hesitate to contact our office.

## 2018-12-19 ENCOUNTER — APPOINTMENT (OUTPATIENT)
Dept: BEHAVIORAL HEALTH | Facility: MEDICAL CENTER | Age: 58
End: 2018-12-19
Attending: PSYCHIATRY & NEUROLOGY
Payer: COMMERCIAL

## 2018-12-22 ENCOUNTER — HOSPITAL ENCOUNTER (INPATIENT)
Dept: HOSPITAL 8 - ED | Age: 58
LOS: 2 days | Discharge: HOME | DRG: 813 | End: 2018-12-24
Attending: HOSPITALIST | Admitting: HOSPITALIST
Payer: COMMERCIAL

## 2018-12-22 VITALS — DIASTOLIC BLOOD PRESSURE: 76 MMHG | SYSTOLIC BLOOD PRESSURE: 120 MMHG

## 2018-12-22 VITALS — DIASTOLIC BLOOD PRESSURE: 67 MMHG | SYSTOLIC BLOOD PRESSURE: 107 MMHG

## 2018-12-22 VITALS — SYSTOLIC BLOOD PRESSURE: 98 MMHG | DIASTOLIC BLOOD PRESSURE: 64 MMHG

## 2018-12-22 VITALS — SYSTOLIC BLOOD PRESSURE: 91 MMHG | DIASTOLIC BLOOD PRESSURE: 52 MMHG

## 2018-12-22 VITALS — HEIGHT: 70 IN | WEIGHT: 159.84 LBS | BODY MASS INDEX: 22.88 KG/M2

## 2018-12-22 DIAGNOSIS — S00.522A: ICD-10-CM

## 2018-12-22 DIAGNOSIS — T37.0X5A: ICD-10-CM

## 2018-12-22 DIAGNOSIS — E03.9: ICD-10-CM

## 2018-12-22 DIAGNOSIS — D69.3: Primary | ICD-10-CM

## 2018-12-22 DIAGNOSIS — E53.8: ICD-10-CM

## 2018-12-22 DIAGNOSIS — F32.9: ICD-10-CM

## 2018-12-22 DIAGNOSIS — G43.909: ICD-10-CM

## 2018-12-22 DIAGNOSIS — Z88.5: ICD-10-CM

## 2018-12-22 DIAGNOSIS — Z88.2: ICD-10-CM

## 2018-12-22 DIAGNOSIS — D69.59: ICD-10-CM

## 2018-12-22 DIAGNOSIS — N17.0: ICD-10-CM

## 2018-12-22 LAB
<PLATELET ESTIMATE>: (no result)
<PLT MORPHOLOGY>: (no result)
ALBUMIN SERPL-MCNC: 4.4 G/DL (ref 3.4–5)
ALP SERPL-CCNC: 90 U/L (ref 45–117)
ALT SERPL-CCNC: 25 U/L (ref 12–78)
ANION GAP SERPL CALC-SCNC: 9 MMOL/L (ref 5–15)
APTT BLD: 35 SECONDS (ref 25–31)
BAND#(MANUAL): 0.05 X10^3/UL
BILIRUB DIRECT SERPL-MCNC: NORMAL MG/DL
BILIRUB SERPL-MCNC: 0.5 MG/DL (ref 0.2–1)
CALCIUM SERPL-MCNC: 9.2 MG/DL (ref 8.5–10.1)
CHLORIDE SERPL-SCNC: 106 MMOL/L (ref 98–107)
CREAT SERPL-MCNC: 1.22 MG/DL (ref 0.55–1.02)
D DIMER PPP FEU-MCNC: 1.73 UG/MLFEU (ref 0–0.52)
EOS#(MANUAL): 0.05 X10^3/UL (ref 0–0.4)
EOS% (MANUAL): 1 % (ref 1–7)
ERYTHROCYTE [DISTWIDTH] IN BLOOD BY AUTOMATED COUNT: 13.9 % (ref 9.6–15.2)
FIBRINOGEN PPP-MCNC: 401 MG/DL (ref 200–340)
INR PPP: 1.01 (ref 0.93–1.1)
LYMPH#(MANUAL): 1.39 X10^3/UL (ref 1–3.4)
LYMPHS% (MANUAL): 29 % (ref 22–44)
MCH RBC QN AUTO: 31 PG (ref 27–34.8)
MCHC RBC AUTO-ENTMCNC: 33.4 G/DL (ref 32.4–35.8)
MCV RBC AUTO: 92.9 FL (ref 80–100)
MD: YES
MONOS#(MANUAL): 0.48 X10^3/UL (ref 0.3–2.7)
MONOS% (MANUAL): 10 % (ref 2–9)
NEUTS BAND NFR BLD: 1 % (ref 0–7)
PLATELET # BLD AUTO: 5 X10^3/UL (ref 130–400)
PMV BLD AUTO: 9.5 FL (ref 7.4–10.4)
PROT SERPL-MCNC: 7.7 G/DL (ref 6.4–8.2)
PROTHROMBIN TIME: 10.7 SECONDS (ref 9.6–11.5)
RBC # BLD AUTO: 4.4 X10^6/UL (ref 3.82–5.3)
SEG#(MANUAL): 2.83 X10^3/UL (ref 1.8–6.8)
SEGS% (MANUAL): 59 % (ref 42–75)

## 2018-12-22 PROCEDURE — 80053 COMPREHEN METABOLIC PANEL: CPT

## 2018-12-22 PROCEDURE — 85651 RBC SED RATE NONAUTOMATED: CPT

## 2018-12-22 PROCEDURE — G0475 HIV COMBINATION ASSAY: HCPCS

## 2018-12-22 PROCEDURE — 70450 CT HEAD/BRAIN W/O DYE: CPT

## 2018-12-22 PROCEDURE — 85379 FIBRIN DEGRADATION QUANT: CPT

## 2018-12-22 PROCEDURE — 86038 ANTINUCLEAR ANTIBODIES: CPT

## 2018-12-22 PROCEDURE — 86850 RBC ANTIBODY SCREEN: CPT

## 2018-12-22 PROCEDURE — 85730 THROMBOPLASTIN TIME PARTIAL: CPT

## 2018-12-22 PROCEDURE — 86430 RHEUMATOID FACTOR TEST QUAL: CPT

## 2018-12-22 PROCEDURE — P9035 PLATELET PHERES LEUKOREDUCED: HCPCS

## 2018-12-22 PROCEDURE — 87521 HEPATITIS C PROBE&RVRS TRNSC: CPT

## 2018-12-22 PROCEDURE — 82746 ASSAY OF FOLIC ACID SERUM: CPT

## 2018-12-22 PROCEDURE — 80048 BASIC METABOLIC PNL TOTAL CA: CPT

## 2018-12-22 PROCEDURE — 36415 COLL VENOUS BLD VENIPUNCTURE: CPT

## 2018-12-22 PROCEDURE — 82607 VITAMIN B-12: CPT

## 2018-12-22 PROCEDURE — 87806 HIV AG W/HIV1&2 ANTB W/OPTIC: CPT

## 2018-12-22 PROCEDURE — 30233R1 TRANSFUSION OF NONAUTOLOGOUS PLATELETS INTO PERIPHERAL VEIN, PERCUTANEOUS APPROACH: ICD-10-PCS | Performed by: NURSE PRACTITIONER

## 2018-12-22 PROCEDURE — 85610 PROTHROMBIN TIME: CPT

## 2018-12-22 PROCEDURE — 81001 URINALYSIS AUTO W/SCOPE: CPT

## 2018-12-22 PROCEDURE — 80074 ACUTE HEPATITIS PANEL: CPT

## 2018-12-22 PROCEDURE — 85384 FIBRINOGEN ACTIVITY: CPT

## 2018-12-22 PROCEDURE — 87205 SMEAR GRAM STAIN: CPT

## 2018-12-22 PROCEDURE — 87070 CULTURE OTHR SPECIMN AEROBIC: CPT

## 2018-12-22 PROCEDURE — 85025 COMPLETE CBC W/AUTO DIFF WBC: CPT

## 2018-12-22 PROCEDURE — 86900 BLOOD TYPING SEROLOGIC ABO: CPT

## 2018-12-22 PROCEDURE — 83735 ASSAY OF MAGNESIUM: CPT

## 2018-12-22 RX ADMIN — TEMAZEPAM PRN MG: 15 CAPSULE ORAL at 21:42

## 2018-12-23 VITALS — SYSTOLIC BLOOD PRESSURE: 114 MMHG | DIASTOLIC BLOOD PRESSURE: 73 MMHG

## 2018-12-23 VITALS — DIASTOLIC BLOOD PRESSURE: 68 MMHG | SYSTOLIC BLOOD PRESSURE: 110 MMHG

## 2018-12-23 VITALS — SYSTOLIC BLOOD PRESSURE: 122 MMHG | DIASTOLIC BLOOD PRESSURE: 71 MMHG

## 2018-12-23 VITALS — SYSTOLIC BLOOD PRESSURE: 126 MMHG | DIASTOLIC BLOOD PRESSURE: 73 MMHG

## 2018-12-23 VITALS — SYSTOLIC BLOOD PRESSURE: 99 MMHG | DIASTOLIC BLOOD PRESSURE: 61 MMHG

## 2018-12-23 VITALS — SYSTOLIC BLOOD PRESSURE: 92 MMHG | DIASTOLIC BLOOD PRESSURE: 51 MMHG

## 2018-12-23 LAB
BASOPHILS # BLD AUTO: 0.01 X10^3/UL (ref 0–0.1)
BASOPHILS NFR BLD AUTO: 0 % (ref 0–1)
EOSINOPHIL # BLD AUTO: 0 X10^3/UL (ref 0–0.4)
EOSINOPHIL NFR BLD AUTO: 0 % (ref 1–7)
ERYTHROCYTE [DISTWIDTH] IN BLOOD BY AUTOMATED COUNT: 13.6 % (ref 9.6–15.2)
ERYTHROCYTE [SEDIMENTATION RATE] IN BLOOD BY WESTERGREN METHOD: 34 MM/HR (ref 0–20)
FOLATE SERPL-MCNC: 7.5 NG/ML (ref 3.1–17.5)
HCT (SEDRATE): 34.1 % (ref 34.6–47.8)
LYMPHOCYTES # BLD AUTO: 0.58 X10^3/UL (ref 1–3.4)
LYMPHOCYTES NFR BLD AUTO: 18 % (ref 22–44)
MCH RBC QN AUTO: 30.5 PG (ref 27–34.8)
MCHC RBC AUTO-ENTMCNC: 33 G/DL (ref 32.4–35.8)
MCV RBC AUTO: 92.6 FL (ref 80–100)
MD: (no result)
MICROSCOPIC: (no result)
MONOCYTES # BLD AUTO: 0.16 X10^3/UL (ref 0.2–0.8)
MONOCYTES NFR BLD AUTO: 5 % (ref 2–9)
NEUTROPHILS # BLD AUTO: 2.42 X10^3/UL (ref 1.8–6.8)
NEUTROPHILS NFR BLD AUTO: 76 % (ref 42–75)
PLATELET # BLD AUTO: 3 X10^3/UL (ref 130–400)
PMV BLD AUTO: 8.9 FL (ref 7.4–10.4)
RBC # BLD AUTO: 3.68 X10^6/UL (ref 3.82–5.3)

## 2018-12-23 RX ADMIN — MUPIROCIN SCH APPLIC: 20 OINTMENT TOPICAL at 08:21

## 2018-12-23 RX ADMIN — MUPIROCIN SCH APPLIC: 20 OINTMENT TOPICAL at 21:00

## 2018-12-23 RX ADMIN — BUDESONIDE AND FORMOTEROL FUMARATE DIHYDRATE SCH MG: 160; 4.5 AEROSOL RESPIRATORY (INHALATION) at 08:21

## 2018-12-23 RX ADMIN — LEVOTHYROXINE SODIUM SCH MCG: 112 TABLET ORAL at 07:13

## 2018-12-23 RX ADMIN — DEXAMETHASONE SCH MG: 4 TABLET ORAL at 08:21

## 2018-12-23 RX ADMIN — ZOLMITRIPTAN PRN MG: 2.5 TABLET ORAL at 20:51

## 2018-12-23 RX ADMIN — TEMAZEPAM PRN MG: 15 CAPSULE ORAL at 22:23

## 2018-12-24 ENCOUNTER — PATIENT MESSAGE (OUTPATIENT)
Dept: BEHAVIORAL HEALTH | Facility: CLINIC | Age: 58
End: 2018-12-24

## 2018-12-24 VITALS — SYSTOLIC BLOOD PRESSURE: 112 MMHG | DIASTOLIC BLOOD PRESSURE: 68 MMHG

## 2018-12-24 VITALS — DIASTOLIC BLOOD PRESSURE: 56 MMHG | SYSTOLIC BLOOD PRESSURE: 93 MMHG

## 2018-12-24 VITALS — SYSTOLIC BLOOD PRESSURE: 106 MMHG | DIASTOLIC BLOOD PRESSURE: 66 MMHG

## 2018-12-24 VITALS — DIASTOLIC BLOOD PRESSURE: 50 MMHG | SYSTOLIC BLOOD PRESSURE: 88 MMHG

## 2018-12-24 LAB
ANION GAP SERPL CALC-SCNC: 6 MMOL/L (ref 5–15)
BASOPHILS # BLD AUTO: 0.01 X10^3/UL (ref 0–0.1)
BASOPHILS NFR BLD AUTO: 0 % (ref 0–1)
CALCIUM SERPL-MCNC: 8.3 MG/DL (ref 8.5–10.1)
CHLORIDE SERPL-SCNC: 110 MMOL/L (ref 98–107)
CREAT SERPL-MCNC: 0.74 MG/DL (ref 0.55–1.02)
EOSINOPHIL # BLD AUTO: 0.08 X10^3/UL (ref 0–0.4)
EOSINOPHIL NFR BLD AUTO: 2 % (ref 1–7)
ERYTHROCYTE [DISTWIDTH] IN BLOOD BY AUTOMATED COUNT: 13.9 % (ref 9.6–15.2)
LYMPHOCYTES # BLD AUTO: 0.79 X10^3/UL (ref 1–3.4)
LYMPHOCYTES NFR BLD AUTO: 19 % (ref 22–44)
MCH RBC QN AUTO: 31.9 PG (ref 27–34.8)
MCHC RBC AUTO-ENTMCNC: 34.4 G/DL (ref 32.4–35.8)
MCV RBC AUTO: 92.6 FL (ref 80–100)
MD: (no result)
MONOCYTES # BLD AUTO: 0.44 X10^3/UL (ref 0.2–0.8)
MONOCYTES NFR BLD AUTO: 11 % (ref 2–9)
NEUTROPHILS # BLD AUTO: 2.84 X10^3/UL (ref 1.8–6.8)
NEUTROPHILS NFR BLD AUTO: 68 % (ref 42–75)
PLATELET # BLD AUTO: 52 X10^3/UL (ref 130–400)
PMV BLD AUTO: 8 FL (ref 7.4–10.4)
RBC # BLD AUTO: 3.06 X10^6/UL (ref 3.82–5.3)

## 2018-12-24 RX ORDER — SERTRALINE HYDROCHLORIDE 100 MG/1
TABLET, FILM COATED ORAL
Qty: 60 TAB | Refills: 1 | Status: SHIPPED | OUTPATIENT
Start: 2018-12-24 | End: 2019-01-11

## 2018-12-24 RX ADMIN — ZOLMITRIPTAN PRN MG: 2.5 TABLET ORAL at 11:14

## 2018-12-24 RX ADMIN — MUPIROCIN SCH APPLIC: 20 OINTMENT TOPICAL at 08:20

## 2018-12-24 RX ADMIN — DEXAMETHASONE SCH MG: 4 TABLET ORAL at 08:21

## 2018-12-24 RX ADMIN — BUDESONIDE AND FORMOTEROL FUMARATE DIHYDRATE SCH MG: 160; 4.5 AEROSOL RESPIRATORY (INHALATION) at 08:20

## 2018-12-24 RX ADMIN — LEVOTHYROXINE SODIUM SCH MCG: 112 TABLET ORAL at 06:26

## 2018-12-25 LAB — ANA SER QL: NEGATIVE

## 2019-01-11 ENCOUNTER — OFFICE VISIT (OUTPATIENT)
Dept: BEHAVIORAL HEALTH | Facility: CLINIC | Age: 59
End: 2019-01-11

## 2019-01-11 VITALS
SYSTOLIC BLOOD PRESSURE: 139 MMHG | WEIGHT: 162 LBS | HEIGHT: 70 IN | DIASTOLIC BLOOD PRESSURE: 84 MMHG | HEART RATE: 102 BPM | BODY MASS INDEX: 23.19 KG/M2

## 2019-01-11 DIAGNOSIS — F41.1 GAD (GENERALIZED ANXIETY DISORDER): ICD-10-CM

## 2019-01-11 DIAGNOSIS — F33.2 MDD (MAJOR DEPRESSIVE DISORDER), RECURRENT SEVERE, WITHOUT PSYCHOSIS (HCC): ICD-10-CM

## 2019-01-11 DIAGNOSIS — F50.81 BINGE-EATING DISORDER, MODERATE: ICD-10-CM

## 2019-01-11 PROBLEM — F33.1 MDD (MAJOR DEPRESSIVE DISORDER), RECURRENT EPISODE, MODERATE (HCC): Status: RESOLVED | Noted: 2017-02-03 | Resolved: 2019-01-11

## 2019-01-11 PROCEDURE — 90833 PSYTX W PT W E/M 30 MIN: CPT | Performed by: PSYCHIATRY & NEUROLOGY

## 2019-01-11 PROCEDURE — 99214 OFFICE O/P EST MOD 30 MIN: CPT | Performed by: PSYCHIATRY & NEUROLOGY

## 2019-01-11 RX ORDER — TRAZODONE HYDROCHLORIDE 50 MG/1
50 TABLET ORAL NIGHTLY PRN
Qty: 30 TAB | Refills: 0 | Status: SHIPPED | OUTPATIENT
Start: 2019-01-11 | End: 2019-02-15 | Stop reason: SDUPTHER

## 2019-01-11 RX ORDER — CLONAZEPAM 0.5 MG/1
0.5 TABLET ORAL 2 TIMES DAILY
Qty: 60 TAB | Refills: 2 | Status: SHIPPED | OUTPATIENT
Start: 2019-01-11 | End: 2019-02-10

## 2019-01-11 RX ORDER — CLONAZEPAM 0.5 MG/1
0.5 TABLET ORAL 2 TIMES DAILY
COMMUNITY
Start: 2018-12-13 | End: 2019-01-11 | Stop reason: SDUPTHER

## 2019-01-11 RX ORDER — LISDEXAMFETAMINE DIMESYLATE 60 MG/1
60 CAPSULE ORAL EVERY MORNING
Qty: 30 CAP | Refills: 0 | Status: SHIPPED | OUTPATIENT
Start: 2019-01-11 | End: 2019-02-10

## 2019-01-11 RX ORDER — LISDEXAMFETAMINE DIMESYLATE 60 MG/1
60 CAPSULE ORAL EVERY MORNING
Qty: 30 CAP | Refills: 0 | Status: SHIPPED | OUTPATIENT
Start: 2019-02-09 | End: 2019-02-15 | Stop reason: SDUPTHER

## 2019-01-11 RX ORDER — LISDEXAMFETAMINE DIMESYLATE 60 MG/1
60 CAPSULE ORAL EVERY MORNING
COMMUNITY
Start: 2018-12-13 | End: 2019-01-11 | Stop reason: SDUPTHER

## 2019-01-11 NOTE — PROGRESS NOTES
PSYCHIATRY FOLLOW-UP NOTE      Chief Complaint   Patient presents with   • Follow-Up     depression, anxiety         History Of Present Illness:  Sammi Alberto is a 58 y.o. old female with major depressive disorder, generalized anxiety disorder, binge eating disorder,  hypothyroidism, s/p lap band surgery in 2010, parathyroidectomy, left ACL and meniscal repair comes in today for follow up, was last seen 4 weeks ago.  She continues to struggle with depression and anxiety and is having a hard time feeling happy.  She was started on Lexapro on her last appointment and she was hospitalized on the 23rd with severe thrombocytopenia.  She received 2 platelet infusions as well as IVIG.  She has been following up with hematology and her platelet count is back to normal.  She spoke with Dr. Balderrama and she was cross tapered from Lexapro to Zoloft as it was felt that Lexapro could be contributing to her thrombocytopenia as well.  She woke up on 23 December and saw several petechiae on her body which got her concerned.  She was also on sulfa antibiotics for a skin infection at the same time.  She is feeling better physically now that her platelets are back to normal.  However, she is still struggling with her depression.  She feels that she is going through the day and is able to push herself but is not happy.  She has 2 motivate herself to get out of the house to go for her appointments.  She has been spending some time with her friend but is not happy that the relationship cannot progress further.  She ended a casual relationship with him as he has made clear that he does not want to have a long-term relationship with her.  She has a hard time letting go of the past and does not want to be alone for the rest of her life.  Sleep has been poor.  Appetite has been fine but she continues to eat lots of sugar throughout the day to cope with her depression.  She has been compliant with Vyvanse and feels that it does help her  concentrate but lately she has not noticed benefit with her binge eating.  She continues to have periods of excessive anxiety and she tries to take Klonopin for the same with benefit.  She is struggling with thoughts that she does not want to wake up the next day but denies any active thoughts, intent or plan of wanting to hurt herself or anybody else.    Social History:   She is currently single.  She ended a 4-year long relationship that ended in 9/2017.  She has been  and  x1 and has 2 sons from that marriage who are in their 20s.  Her older son lives in [place] and younger son lives in [place].  She is close with both her kids and her siblings - 3 sisters and a brother.  She lives alone in an apartment in [place].  She is employed and has 2 jobs.  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 30 Cap 0   • [START ON 2/9/2019] VYVANSE 60 MG Cap Take 1 Cap by mouth every morning for 30 days. 30 Cap 0   • traZODone (DESYREL) 50 MG Tab Take 1 Tab by mouth at bedtime as needed for Sleep. 30 Tab 0   • clonazePAM (KLONOPIN) 0.5 MG Tab Take 1 Tab by mouth 2 Times a Day for 30 days. 60 Tab 2   • ketorolac (ACULAR) 0.5 % Solution      • moxifloxacin (VIGAMOX) 0.5 % Solution      • ofloxacin (OCUFLOX) 0.3 % Solution      • prednisoLONE acetate (PRED FORTE) 1 % Suspension      • buPROPion  "(WELLBUTRIN XL) 150 MG XL tablet Take 3 Tabs by mouth every morning. 90 Tab 2   • zolmitriptan (ZOMIG) 2.5 MG Tab Take 2.5 mg by mouth 1 time daily as needed for Headache.     • polymixin-trimethoprim (POLYTRIM) 41886-0.1 UNIT/ML-% Solution      • valacyclovir (VALTREX) 1 GM Tab Take 1,000 mg by mouth 2 times a day.     • therapeutic multivitamin-minerals (THERAGRAN-M) Tab Take 1 Tab by mouth every day.     • pseudoephedrine (SUDAFED) 30 MG Tab Take 60 mg by mouth every four hours as needed for Congestion.     • levothyroxine (SYNTHROID) 112 MCG Tab Take 112 mcg by mouth Every morning on an empty stomach.       No current facility-administered medications for this visit.        Review Of Systems:    Constitutional - Positive for fatigue  Respiratory - Negative for shortness of breath, cough  CVS - Negative for chest pain, palpitations  GI - Negative for nausea, vomiting, abdominal pain, diarrhea, constipation  Musculoskeletal - Negative for back pain  Neurological - Negative for headaches   Psychiatric - Positive for depression, anxiety, angelica eating, poor sleep    Physical Examination:  Vital signs: /84   Pulse (!) 102   Ht 1.778 m (5' 10\")   Wt 73.5 kg (162 lb)   BMI 23.24 kg/m²     Musculoskeletal: Normal gait. No abnormal movements.     Mental Status Evaluation:   General: Middle aged white female, tearful, dressed in casual attire, good grooming and hygiene, in no apparent distress, calm and cooperative, intermittent eye contact, psychomotor retardation  Orientation: Alert and oriented to person, place and time  Recent and remote memory: Grossly intact  Attention span and concentration: Grossly intact  Speech: Spontaneous, normal rate, rhythm and tone  Thought Process: Linear, logical and goal directed  Thought Content: Passive suicidal thoughts +. Denies active suicidal or homicidal ideations, intent or plan  Perception: Denies auditory or visual hallucinations. No delusions noted  Associations: " "Intact  Language: Appropriate  Fund of knowledge and vocabulary: Grossly adequate  Mood: \"fine\"  Affect: Dysphoric, mood congruent  Insight: Good  Judgment: Good    Depression screening:  Depression Screen (PHQ-2/PHQ-9) 2017   PHQ-2 Total Score 2 - -   PHQ-2 Total Score - 6 6   PHQ-9 Total Score 6 - -   PHQ-9 Total Score - 12 16     Interpretation of PHQ-9 Total Score   Score Severity   1-4 No Depression   5-9 Mild Depression   10-14 Moderate Depression   15-19 Moderately Severe Depression   20-27 Severe Depression    Medical Records/Labs/Diagnostic Tests Reviewed:  [facility] records - appropriate refills, no abuse suspected       Impression:  1. Major depressive disorder, recurrent, severe without psychotic symptoms - worsening   2. Binge eating disorder, moderate - worsening  3. Generalized anxiety disorder - worsening  4. Bereavement (mother  18) - stable    Plan:  1.  Taper down Zoloft to 50 mg for the next 2 days and then discontinue.  She was cross tapered to Zoloft for concerns of thrombocytopenia with Lexapro.  It is more likely that her thrombocytopenia was caused by the sulfa antibiotics rather than Lexapro as her platelets have returned back to normal even though she is on Zoloft.  She is however not interested in taking a medication that she has taken before and would like to discontinue it at this time.  2.  Continue Wellbutrin  mg daily for depression, anxiety and eating disorder  4.  Continue Vyvanse 60 mg in the morning for binge eating disorder.    5.  Continue Klonopin 0.5 mg twice daily as needed for anxiety.   6.  Start Trazodone 50 mg at bedtime as needed for sleep  7.  Refer to Partial Hospitalization Program at [facility].  Because of insurance reasons she is unable to do the program in our clinic and I provided her with contact information for [facility] and asked her to call them for an intake appointment  8.  Continue individual " psychotherapy with Dr. Mckenna Jimenez Psy.D at [facility]   9.  Provided supportive psychotherapy (> 16 minutes).  Discussed emotional needs that her relationship feels for her and how she ends up getting hurt when her expectations are not met.  Discussed finding happiness by herself and not looking for the same in a relationship.    Return to clinic in 4 weeks or sooner if symptoms worsen    The proposed treatment plan was discussed with the patient who was provided the opportunity to ask questions and make suggestions regarding alternative treatment. Patient verbalized understanding and expressed agreement with the plan.     Opal aGrcia M.D.  01/11/19    This note was created using voice recognition software (Dragon). The accuracy of the dictation is limited by the abilities of the software. I have reviewed the note prior to signing, however some errors in grammar and context are still possible. If you have any questions related to this note please do not hesitate to contact our office.

## 2019-02-15 ENCOUNTER — OFFICE VISIT (OUTPATIENT)
Dept: BEHAVIORAL HEALTH | Facility: CLINIC | Age: 59
End: 2019-02-15

## 2019-02-15 VITALS — HEIGHT: 70 IN | WEIGHT: 163 LBS | BODY MASS INDEX: 23.34 KG/M2

## 2019-02-15 DIAGNOSIS — F50.81 BINGE-EATING DISORDER, MODERATE: ICD-10-CM

## 2019-02-15 DIAGNOSIS — F41.1 GAD (GENERALIZED ANXIETY DISORDER): ICD-10-CM

## 2019-02-15 DIAGNOSIS — Z63.4 BEREAVEMENT: ICD-10-CM

## 2019-02-15 DIAGNOSIS — F33.2 MDD (MAJOR DEPRESSIVE DISORDER), RECURRENT SEVERE, WITHOUT PSYCHOSIS (HCC): ICD-10-CM

## 2019-02-15 PROCEDURE — 99215 OFFICE O/P EST HI 40 MIN: CPT | Performed by: PSYCHIATRY & NEUROLOGY

## 2019-02-15 RX ORDER — CLONAZEPAM 0.5 MG/1
0.5 TABLET ORAL 2 TIMES DAILY
Qty: 60 TAB | Refills: 0 | Status: SHIPPED | OUTPATIENT
Start: 2019-02-15 | End: 2019-03-17

## 2019-02-15 RX ORDER — LISDEXAMFETAMINE DIMESYLATE 60 MG/1
60 CAPSULE ORAL EVERY MORNING
Qty: 30 CAP | Refills: 0 | Status: SHIPPED | OUTPATIENT
Start: 2019-02-15 | End: 2020-12-09 | Stop reason: SDUPTHER

## 2019-02-15 RX ORDER — ARIPIPRAZOLE 5 MG/1
TABLET ORAL
Qty: 30 TAB | Refills: 0 | Status: SHIPPED | OUTPATIENT
Start: 2019-02-15 | End: 2019-03-15

## 2019-02-15 RX ORDER — CLONAZEPAM 0.5 MG/1
0.5 TABLET ORAL 2 TIMES DAILY
COMMUNITY
Start: 2019-02-14 | End: 2019-02-15 | Stop reason: SDUPTHER

## 2019-02-15 RX ORDER — TRAZODONE HYDROCHLORIDE 50 MG/1
25-50 TABLET ORAL NIGHTLY PRN
Qty: 30 TAB | Refills: 0 | Status: SHIPPED | OUTPATIENT
Start: 2019-02-15 | End: 2019-04-19 | Stop reason: SDUPTHER

## 2019-02-15 SDOH — SOCIAL STABILITY - SOCIAL INSECURITY: DISSAPEARANCE AND DEATH OF FAMILY MEMBER: Z63.4

## 2019-02-15 NOTE — PROGRESS NOTES
PSYCHIATRY FOLLOW-UP NOTE      Chief Complaint   Patient presents with   • Depression         History Of Present Illness:  Sammi Alberto is a 58 y.o. old female with major depressive disorder, generalized anxiety disorder, binge eating disorder,  hypothyroidism, s/p lap band surgery in 2010, parathyroidectomy, left ACL and meniscal repair comes in today for follow up, was last seen 4 weeks ago.  She continues to struggle with depression and is not endorsing any changes in her mood in the last month or so.  She is feeling more frustrated and discouraged and feels like she has given up on both medications and psychotherapy.  She did set up an appointment at [facility] for PHP/IOP but was not happy when she got there so she did not go through with her appointment.  She is not interested in any intensive outpatient program at this time.  She moved from [place] to [place] couple of weeks ago and feels that she should be happy but is not.  She has been compliant with all of her psychiatric medications but is not sure if they are helping her or not.  She did try trazodone and felt that the 50 mg was too sedating that she has been taking 25 mg at bedtime now.  She continues to have passive thoughts of death but denies active thoughts, intent or plan of wanting to hurt herself.    Social History:   She is currently single.  She ended a 4-year long relationship in 9/2017.  She has been  and  x1 and has 2 sons from that marriage who are in their 20s.  Her older son lives in [place] and younger son lives in [place] but is moving to [place] soon.  She is close with both her kids and her siblings - 3 sisters and a brother.  She lives alone in [place]. 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 recent use, has been sober since the  age of 16 after successfully completing an inpatient rehab in [place]  Nicotine - Denies  Illicit drugs - Denies    Past Medication Trials:  Prozac (ineffective), Nortriptyline, Effexor  mg (initally effective), Zoloft 200 mg (ineffective), Lexapro 20 mg    Medications:  Current Outpatient Prescriptions   Medication Sig Dispense Refill   • aripiprazole (ABILIFY) 5 MG tablet Take 1/2 tablet daily x 2 weeks and then increase to 1 tablet daily 30 Tab 0   • VYVANSE 60 MG Cap Take 1 Cap by mouth every morning for 30 days. 30 Cap 0   • clonazePAM (KLONOPIN) 0.5 MG Tab Take 1 Tab by mouth 2 Times a Day for 30 days. 60 Tab 0   • traZODone (DESYREL) 50 MG Tab Take 0.5-1 Tabs by mouth at bedtime as needed for Sleep. 30 Tab 0   • ketorolac (ACULAR) 0.5 % Solution      • moxifloxacin (VIGAMOX) 0.5 % Solution      • ofloxacin (OCUFLOX) 0.3 % Solution      • prednisoLONE acetate (PRED FORTE) 1 % Suspension      • buPROPion (WELLBUTRIN XL) 150 MG XL tablet Take 3 Tabs by mouth every morning. 90 Tab 2   • zolmitriptan (ZOMIG) 2.5 MG Tab Take 2.5 mg by mouth 1 time daily as needed for Headache.     • polymixin-trimethoprim (POLYTRIM) 19231-4.1 UNIT/ML-% Solution      • valacyclovir (VALTREX) 1 GM Tab Take 1,000 mg by mouth 2 times a day.     • therapeutic multivitamin-minerals (THERAGRAN-M) Tab Take 1 Tab by mouth every day.     • pseudoephedrine (SUDAFED) 30 MG Tab Take 60 mg by mouth every four hours as needed for Congestion.     • levothyroxine (SYNTHROID) 112 MCG Tab Take 112 mcg by mouth Every morning on an empty stomach.       No current facility-administered medications for this visit.        Review Of Systems:    Constitutional - Positive for fatigue  Respiratory - Negative for shortness of breath, cough  CVS - Negative for chest pain, palpitations  GI - Negative for nausea, vomiting, abdominal pain, diarrhea, constipation  Musculoskeletal - Negative for back pain  Neurological - Negative for headaches   Psychiatric  "- Positive for depression, anxiety    Physical Examination:  Vital signs: Ht 1.778 m (5' 10\")   Wt 73.9 kg (163 lb)   BMI 23.39 kg/m²     Musculoskeletal: Normal gait. No abnormal movements.     Mental Status Evaluation:   General: Middle aged white female, tearful, dressed in casual attire, good grooming and hygiene, in no apparent distress, calm and cooperative, intermittent eye contact, psychomotor retardation  Orientation: Alert and oriented to person, place and time  Recent and remote memory: Grossly intact  Attention span and concentration: Grossly intact  Speech: Spontaneous, normal rate, rhythm and tone  Thought Process: Linear, logical and goal directed  Thought Content: Passive suicidal thoughts +. Denies active suicidal or homicidal ideations, intent or plan  Perception: Denies auditory or visual hallucinations. No delusions noted  Associations: Intact  Language: Appropriate  Fund of knowledge and vocabulary: Grossly adequate  Mood: \"the same\"  Affect: Dysphoric, mood congruent  Insight: Good  Judgment: Good    Depression screening:  Depression Screen (PHQ-2/PHQ-9) 2017   PHQ-2 Total Score 2 - -   PHQ-2 Total Score - 6 6   PHQ-9 Total Score 6 - -   PHQ-9 Total Score - 12 16     Interpretation of PHQ-9 Total Score   Score Severity   1-4 No Depression   5-9 Mild Depression   10-14 Moderate Depression   15-19 Moderately Severe Depression   20-27 Severe Depression    Medical Records/Labs/Diagnostic Tests Reviewed:  [facility] records - appropriate refills, no abuse suspected       Impression:  1. Major depressive disorder, recurrent, severe without psychotic symptoms   2. Binge eating disorder, moderate   3. Generalized anxiety disorder   4. Bereavement (mother  18)     Plan:  1.  Start Abilify 2.5 mg at bedtime for 1 week and then increase to 5 mg at bedtime for depression augmentation.  2.  Continue Wellbutrin  mg daily for depression, anxiety and eating disorder  3.  " Continue Vyvanse 60 mg in the morning for binge eating disorder.    4.  Continue Klonopin 0.5 mg twice daily as needed for anxiety.   5.  Continue Trazodone 25-50 mg at bedtime as needed for sleep  6.  Discussed PHP/IOP but she is not interested at this time  7.  Continue individual psychotherapy with Dr. Mckenna Jimenez Psy.D at [facility]     Total face-to-face time: 45 minutes  More than 50% of face-to-face time was spent in counseling and coordinating care. Discussed depression and treatment options.    Return to clinic in 4 weeks or sooner if symptoms worsen    The proposed treatment plan was discussed with the patient who was provided the opportunity to ask questions and make suggestions regarding alternative treatment. Patient verbalized understanding and expressed agreement with the plan.     Opal Garcia M.D.  02/15/19    This note was created using voice recognition software (Dragon). The accuracy of the dictation is limited by the abilities of the software. I have reviewed the note prior to signing, however some errors in grammar and context are still possible. If you have any questions related to this note please do not hesitate to contact our office.

## 2019-02-27 ENCOUNTER — OFFICE VISIT (OUTPATIENT)
Dept: BEHAVIORAL HEALTH | Facility: CLINIC | Age: 59
End: 2019-02-27

## 2019-02-27 VITALS
DIASTOLIC BLOOD PRESSURE: 79 MMHG | HEIGHT: 70 IN | BODY MASS INDEX: 23.48 KG/M2 | WEIGHT: 164 LBS | HEART RATE: 96 BPM | SYSTOLIC BLOOD PRESSURE: 122 MMHG

## 2019-02-27 DIAGNOSIS — F33.2 MDD (MAJOR DEPRESSIVE DISORDER), RECURRENT SEVERE, WITHOUT PSYCHOSIS (HCC): ICD-10-CM

## 2019-02-27 DIAGNOSIS — F50.81 BINGE-EATING DISORDER, MODERATE: ICD-10-CM

## 2019-02-27 DIAGNOSIS — F41.1 GAD (GENERALIZED ANXIETY DISORDER): ICD-10-CM

## 2019-02-27 PROCEDURE — 90833 PSYTX W PT W E/M 30 MIN: CPT | Performed by: PSYCHIATRY & NEUROLOGY

## 2019-02-27 PROCEDURE — 99214 OFFICE O/P EST MOD 30 MIN: CPT | Performed by: PSYCHIATRY & NEUROLOGY

## 2019-02-27 NOTE — PROGRESS NOTES
"PSYCHIATRY FOLLOW-UP NOTE      Chief Complaint   Patient presents with   • Depression   • Medication Management     \"want to stop all the medications\"         History Of Present Illness:  Sammi Alberto is a 58 y.o. old female with major depressive disorder, generalized anxiety disorder, binge eating disorder,  hypothyroidism, s/p lap band surgery in 2010, parathyroidectomy, left ACL and meniscal repair comes in today for follow up, was last seen 2 weeks ago.  She continues to struggle with both depression and anxiety has been thinking about stopping her psychiatric medications as she does not feel that the medications have helped her in a while.  She started Abilify about 3 days ago after she came back from a work-related trip.  She continues to struggle with lack of motivation and frequent crying spells.  She was in a relationship that ended over a year ago but she has not been able to get over it.  She continues to blame herself for being in the relationship even though there were some warning signs.  She has a hard time forgetting him and misses the stuff that he used to do together.  She has been keeping in touch with her friends in town and does go out and is able to enjoy the time.  She did take a trip to [place] for a game with a friend and had a nice time there.  She is struggling with her appetite but has been eating a lot of candy and sweets.  She feels that the only thing at this time that is helping her with the Klonopin which calms down her anxiety and stops the crying spells.  She denies having active thoughts, intent or plans of wanting to hurt herself or others but    Social History:   She is currently single.  She ended a 4-year long relationship in 9/2017.  She has been  and  x1 and has 2 sons from that marriage who are in their 20s.  Her older son lives in [place] and younger son lives in [place] but is moving to [place] soon.  She is close with both her kids and her siblings - " 3 sisters and a brother.  She lives alone in [place]. 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  She also works part-time at a local [employer] in the [facility].    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 30 Cap 0   • clonazePAM (KLONOPIN) 0.5 MG Tab Take 1 Tab by mouth 2 Times a Day for 30 days. 60 Tab 0   • traZODone (DESYREL) 50 MG Tab Take 0.5-1 Tabs by mouth at bedtime as needed for Sleep. 30 Tab 0   • ketorolac (ACULAR) 0.5 % Solution      • moxifloxacin (VIGAMOX) 0.5 % Solution      • ofloxacin (OCUFLOX) 0.3 % Solution      • prednisoLONE acetate (PRED FORTE) 1 % Suspension      • buPROPion (WELLBUTRIN XL) 150 MG XL tablet Take 3 Tabs by mouth every morning. 90 Tab 2   • zolmitriptan (ZOMIG) 2.5 MG Tab Take 2.5 mg by mouth 1 time daily as needed for Headache.     • polymixin-trimethoprim (POLYTRIM) 76240-8.1 UNIT/ML-% Solution      • valacyclovir (VALTREX) 1 GM Tab Take 1,000 mg by mouth 2 times a day.     • therapeutic multivitamin-minerals (THERAGRAN-M) Tab Take 1 Tab by mouth every day.     • pseudoephedrine (SUDAFED) 30 MG Tab Take 60 mg by mouth every four hours as needed for Congestion.     • levothyroxine (SYNTHROID) 112 MCG Tab Take 112 mcg by mouth Every morning on an empty stomach.       No current  "facility-administered medications for this visit.        Review Of Systems:    Constitutional - Positive for fatigue, decreased appetite  Respiratory - Negative for shortness of breath, cough  CVS - Negative for chest pain, palpitations  GI - Negative for nausea, vomiting, abdominal pain, diarrhea, constipation  Musculoskeletal - Negative for back pain  Neurological - Negative for headaches   Psychiatric - Positive for depression, anxiety    Physical Examination:  Vital signs: /79   Pulse 96   Ht 1.778 m (5' 10\")   Wt 74.4 kg (164 lb)   BMI 23.53 kg/m²      Musculoskeletal: Normal gait. No abnormal movements.     Mental Status Evaluation:   General: Middle aged white female, tearful, dressed in casual attire, good grooming and hygiene, in no apparent distress, calm and cooperative, intermittent eye contact, psychomotor retardation  Orientation: Alert and oriented to person, place and time  Recent and remote memory: Grossly intact  Attention span and concentration: Grossly intact  Speech: Spontaneous, normal rate, rhythm and tone  Thought Process: Linear, logical and goal directed  Thought Content: Passive suicidal thoughts +. Denies active suicidal or homicidal ideations, intent or plan  Perception: Denies auditory or visual hallucinations. No delusions noted  Associations: Intact  Language: Appropriate  Fund of knowledge and vocabulary: Grossly adequate  Mood: \"the same\"  Affect: Dysphoric, mood congruent  Insight: Good  Judgment: Good    Depression screening:  Depression Screen (PHQ-2/PHQ-9) 11/17/2017 4/4/2018 12/11/2018   PHQ-2 Total Score 2 - -   PHQ-2 Total Score - 6 6   PHQ-9 Total Score 6 - -   PHQ-9 Total Score - 12 16     Interpretation of PHQ-9 Total Score   Score Severity   1-4 No Depression   5-9 Mild Depression   10-14 Moderate Depression   15-19 Moderately Severe Depression   20-27 Severe Depression    Medical Records/Labs/Diagnostic Tests Reviewed:  [place]  records - appropriate refills, no " abuse suspected       Impression:  1. Major depressive disorder, recurrent, severe without psychotic symptoms - stable  2. Binge eating disorder, moderate - stable  3. Generalized anxiety disorder - stable  4. Bereavement (mother  18) - stable    Plan:  1. Refer to [facility] for transcranial magnetic stimulation.  I have given her the contact information and advised her to call the clinic if she does not hear from them by early next week.  2.  Increase Abilify to 5 mg at bedtime for depression augmentation  3.  Continue Wellbutrin  mg for depression, anxiety and eating disorder  4.  Continue Klonopin 0.5 mg twice daily as needed for anxiety  5. Continue Trazodone 25-50 mg at bedtime as needed for sleep.  I have advised her to use it only on as-needed basis and not to use it every day as she finds that hard to wake up in the morning.  6.  Continue Vyvanse 60 mg daily for binge eating disorder  7.  Continue individual psychotherapy with Dr. Mckenna Jimenez Psy.D at [facility]   8.  Provided supportive psychotherapy (> 16 minutes).  Discussed her last relationship and how it continues to affect her mood and to work on letting go and forgiving herself.    Return to clinic in 2 weeks or sooner if symptoms worsen    The proposed treatment plan was discussed with the patient who was provided the opportunity to ask questions and make suggestions regarding alternative treatment. Patient verbalized understanding and expressed agreement with the plan.     Opal Garcia M.D.  02/15/19    This note was created using voice recognition software (Dragon). The accuracy of the dictation is limited by the abilities of the software. I have reviewed the note prior to signing, however some errors in grammar and context are still possible. If you have any questions related to this note please do not hesitate to contact our office.

## 2019-03-15 ENCOUNTER — OFFICE VISIT (OUTPATIENT)
Dept: BEHAVIORAL HEALTH | Facility: CLINIC | Age: 59
End: 2019-03-15
Payer: COMMERCIAL

## 2019-03-15 VITALS
DIASTOLIC BLOOD PRESSURE: 77 MMHG | WEIGHT: 165 LBS | HEART RATE: 97 BPM | BODY MASS INDEX: 23.62 KG/M2 | HEIGHT: 70 IN | SYSTOLIC BLOOD PRESSURE: 127 MMHG

## 2019-03-15 DIAGNOSIS — F41.1 GAD (GENERALIZED ANXIETY DISORDER): ICD-10-CM

## 2019-03-15 DIAGNOSIS — F33.2 MDD (MAJOR DEPRESSIVE DISORDER), RECURRENT SEVERE, WITHOUT PSYCHOSIS (HCC): ICD-10-CM

## 2019-03-15 DIAGNOSIS — Z63.4 BEREAVEMENT: ICD-10-CM

## 2019-03-15 DIAGNOSIS — F50.81 BINGE-EATING DISORDER, MODERATE: ICD-10-CM

## 2019-03-15 PROCEDURE — 99214 OFFICE O/P EST MOD 30 MIN: CPT | Performed by: PSYCHIATRY & NEUROLOGY

## 2019-03-15 RX ORDER — LISDEXAMFETAMINE DIMESYLATE CAPSULES 60 MG/1
60 CAPSULE ORAL EVERY MORNING
Qty: 30 CAP | Refills: 0 | Status: SHIPPED | OUTPATIENT
Start: 2019-03-28 | End: 2019-04-27

## 2019-03-15 RX ORDER — ARIPIPRAZOLE 10 MG/1
10 TABLET ORAL DAILY
Qty: 90 TAB | Refills: 0 | Status: SHIPPED | OUTPATIENT
Start: 2019-03-15 | End: 2019-04-19

## 2019-03-15 SDOH — SOCIAL STABILITY - SOCIAL INSECURITY: DISSAPEARANCE AND DEATH OF FAMILY MEMBER: Z63.4

## 2019-03-15 NOTE — PROGRESS NOTES
PSYCHIATRY FOLLOW-UP NOTE      Chief Complaint   Patient presents with   • Follow-Up     Depression, anxiety         History Of Present Illness:  Sammi Alberto is a 58 y.o. old female with major depressive disorder, generalized anxiety disorder, binge eating disorder,  hypothyroidism, s/p lap band surgery in 2010, parathyroidectomy, left ACL and meniscal repair comes in today for follow up, was last seen 2 weeks ago.  She continues to struggle with both depression and anxiety and is not finding relief.  She has been compliant with Abilify and on she is notices improvements in her sleep.  She does not think it has helped with her depression yet.  She continues to feel frustrated with how she is feeling and feels like giving up.  She has been able to work both of her jobs and is taking a 2-week work-related trip to proceed at the end of this month.  She states that she was fired by her therapist.  I had a talk with her therapist few weeks ago and she had been recommending higher level of care for a long time and was unable to provide adequate care and this was a reason to let her go.  She is still not interested in PHP/IOP as she is not in a financial position to do that.  She denies any increase in his psychosocial stressors.  She continues to have moments of panic and there are times when she uses more than prescribed Klonopin.  She has been compliant with all of her other medications as well.  She continues to think about disappearing but denies active thoughts, intent or plan of wanting to hurt herself.    Social History:   She is currently single.  She ended a 4-year long relationship in 9/2017.  She has been  and  x1 and has 2 sons from that marriage who are in their 20s.  Her older son lives in [place] and younger son lives in [place] but is moving to [place] soon.  She is close with both her kids and her siblings - 3 sisters and a brother.  She lives alone in [place]. 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 30 Cap 0   • ARIPiprazole (ABILIFY) 10 MG Tab Take 1 Tab by mouth every day. 90 Tab 0   • VYVANSE 60 MG Cap Take 1 Cap by mouth every morning for 30 days. 30 Cap 0   • clonazePAM (KLONOPIN) 0.5 MG Tab Take 1 Tab by mouth 2 Times a Day for 30 days. 60 Tab 0   • traZODone (DESYREL) 50 MG Tab Take 0.5-1 Tabs by mouth at bedtime as needed for Sleep. 30 Tab 0   • ketorolac (ACULAR) 0.5 % Solution      • moxifloxacin (VIGAMOX) 0.5 % Solution      • ofloxacin (OCUFLOX) 0.3 % Solution      • prednisoLONE acetate (PRED FORTE) 1 % Suspension      • buPROPion (WELLBUTRIN XL) 150 MG XL tablet Take 3 Tabs by mouth every morning. 90 Tab 2   • zolmitriptan (ZOMIG) 2.5 MG Tab Take 2.5 mg by mouth 1 time daily as needed for Headache.     • polymixin-trimethoprim (POLYTRIM) 71531-4.1 UNIT/ML-% Solution      • valacyclovir (VALTREX) 1 GM Tab Take 1,000 mg by mouth 2 times a day.     • therapeutic multivitamin-minerals (THERAGRAN-M) Tab Take 1 Tab by mouth every day.     • pseudoephedrine (SUDAFED) 30 MG Tab Take 60 mg by mouth every four hours as needed for Congestion.     • levothyroxine (SYNTHROID) 112 MCG Tab Take 112 mcg by mouth Every morning on an empty stomach.       No current  "facility-administered medications for this visit.        Review Of Systems:    Constitutional - Positive for fatigue, decreased appetite  Respiratory - Negative for shortness of breath, cough  CVS - Negative for chest pain, palpitations  GI - Negative for nausea, vomiting, abdominal pain, diarrhea, constipation  Musculoskeletal - Negative for back pain  Neurological - Negative for headaches   Psychiatric - Positive for depression, anxiety    Physical Examination:  Vital signs: /77   Pulse 97   Ht 1.778 m (5' 10\")   Wt 74.8 kg (165 lb)   BMI 23.68 kg/m²     Musculoskeletal: Normal gait. No abnormal movements.     Mental Status Evaluation:   General: Middle aged white female, tearful, dressed in casual attire, good grooming and hygiene, in no apparent distress, calm and cooperative, intermittent eye contact, psychomotor retardation  Orientation: Alert and oriented to person, place and time  Recent and remote memory: Grossly intact  Attention span and concentration: Grossly intact  Speech: Spontaneous, normal rate, rhythm and tone  Thought Process: Linear, logical and goal directed  Thought Content: Passive suicidal thoughts +. Denies active suicidal or homicidal ideations, intent or plan  Perception: Denies auditory or visual hallucinations. No delusions noted  Associations: Intact  Language: Appropriate  Fund of knowledge and vocabulary: Grossly adequate  Mood: \"the same\"  Affect: Dysphoric, mood congruent  Insight: Good  Judgment: Good    Depression screening:  Depression Screen (PHQ-2/PHQ-9) 11/17/2017 4/4/2018 12/11/2018   PHQ-2 Total Score 2 - -   PHQ-2 Total Score - 6 6   PHQ-9 Total Score 6 - -   PHQ-9 Total Score - 12 16     Interpretation of PHQ-9 Total Score   Score Severity   1-4 No Depression   5-9 Mild Depression   10-14 Moderate Depression   15-19 Moderately Severe Depression   20-27 Severe Depression    Medical Records/Labs/Diagnostic Tests Reviewed:  [place]  records - appropriate refills, no " abuse suspected       Impression:  1. Major depressive disorder, recurrent, severe without psychotic symptoms - stable  2. Binge eating disorder, moderate - stable  3. Generalized anxiety disorder - stable  4. Bereavement (mother  18) - stable    Plan:  1.  Will coordinate care with [facility] for transcranial magnetic stimulation.  She was informed by them that since there is no MD supervision it will not be covered by her insurance.  2.  Increase Abilify to 10 mg at bedtime for depression augmentation   - AIMS 0 today  3.  Continue Wellbutrin  mg for depression, anxiety and eating disorder  4.  Continue Klonopin 0.5 mg twice daily as needed for anxiety.  Will discuss switching to Ativan and increasing dose at her next appointment.  5.  Continue Vyvanse 60 mg in the morning for binge eating disorder  6. Continue Trazodone 25-50 mg at bedtime as needed for sleep.  7.  Refer to individual psychotherapy    Return to clinic in 4 weeks or sooner if symptoms worsen    The proposed treatment plan was discussed with the patient who was provided the opportunity to ask questions and make suggestions regarding alternative treatment. Patient verbalized understanding and expressed agreement with the plan.     Opal Garcia M.D.  03/15/19    This note was created using voice recognition software (Dragon). The accuracy of the dictation is limited by the abilities of the software. I have reviewed the note prior to signing, however some errors in grammar and context are still possible. If you have any questions related to this note please do not hesitate to contact our office.

## 2019-03-27 ENCOUNTER — PATIENT MESSAGE (OUTPATIENT)
Dept: BEHAVIORAL HEALTH | Facility: CLINIC | Age: 59
End: 2019-03-27

## 2019-03-27 DIAGNOSIS — F33.2 MDD (MAJOR DEPRESSIVE DISORDER), RECURRENT SEVERE, WITHOUT PSYCHOSIS (HCC): ICD-10-CM

## 2019-03-27 DIAGNOSIS — F41.1 GAD (GENERALIZED ANXIETY DISORDER): ICD-10-CM

## 2019-03-27 RX ORDER — BUPROPION HYDROCHLORIDE 150 MG/1
450 TABLET ORAL EVERY MORNING
Qty: 90 TAB | Refills: 1 | Status: SHIPPED | OUTPATIENT
Start: 2019-03-27 | End: 2019-07-24

## 2019-04-19 ENCOUNTER — OFFICE VISIT (OUTPATIENT)
Dept: BEHAVIORAL HEALTH | Facility: CLINIC | Age: 59
End: 2019-04-19
Payer: COMMERCIAL

## 2019-04-19 VITALS
HEIGHT: 70 IN | SYSTOLIC BLOOD PRESSURE: 126 MMHG | WEIGHT: 163 LBS | BODY MASS INDEX: 23.34 KG/M2 | DIASTOLIC BLOOD PRESSURE: 80 MMHG

## 2019-04-19 DIAGNOSIS — F33.2 MDD (MAJOR DEPRESSIVE DISORDER), RECURRENT SEVERE, WITHOUT PSYCHOSIS (HCC): ICD-10-CM

## 2019-04-19 DIAGNOSIS — Z63.4 BEREAVEMENT: ICD-10-CM

## 2019-04-19 DIAGNOSIS — F50.81 BINGE-EATING DISORDER, MODERATE: ICD-10-CM

## 2019-04-19 DIAGNOSIS — F41.1 GAD (GENERALIZED ANXIETY DISORDER): ICD-10-CM

## 2019-04-19 PROCEDURE — 90833 PSYTX W PT W E/M 30 MIN: CPT | Performed by: PSYCHIATRY & NEUROLOGY

## 2019-04-19 PROCEDURE — 99214 OFFICE O/P EST MOD 30 MIN: CPT | Performed by: PSYCHIATRY & NEUROLOGY

## 2019-04-19 RX ORDER — TRAZODONE HYDROCHLORIDE 50 MG/1
25-50 TABLET ORAL NIGHTLY PRN
Qty: 30 TAB | Refills: 2 | Status: SHIPPED | OUTPATIENT
Start: 2019-04-19 | End: 2019-06-04 | Stop reason: SDUPTHER

## 2019-04-19 RX ORDER — PHENTERMINE HYDROCHLORIDE 37.5 MG/1
37.5 TABLET ORAL
COMMUNITY
End: 2019-07-31

## 2019-04-19 RX ORDER — FLUOXETINE HYDROCHLORIDE 20 MG/1
20 CAPSULE ORAL DAILY
Qty: 30 CAP | Refills: 1 | Status: SHIPPED | OUTPATIENT
Start: 2019-04-19 | End: 2019-06-03 | Stop reason: SDUPTHER

## 2019-04-19 RX ORDER — CLONAZEPAM 0.5 MG/1
0.5 TABLET ORAL 2 TIMES DAILY PRN
Qty: 60 TAB | Refills: 2 | Status: SHIPPED
Start: 2019-04-19 | End: 2019-05-19

## 2019-04-19 SDOH — SOCIAL STABILITY - SOCIAL INSECURITY: DISSAPEARANCE AND DEATH OF FAMILY MEMBER: Z63.4

## 2019-04-19 NOTE — PROGRESS NOTES
PSYCHIATRY FOLLOW-UP NOTE      Chief Complaint   Patient presents with   • Follow-Up     depression, anxiety         History Of Present Illness:  Sammi Alberto is a 58 y.o. old female with major depressive disorder, generalized anxiety disorder, binge eating disorder,  hypothyroidism, s/p lap band surgery in 2010, parathyroidectomy, left ACL and meniscal repair comes in today for follow up, was last seen 4 weeks ago.  She has been feeling better in regards to her depression but not so much in regards to her anxiety.  Her affect was a lot better than her last several appointments.  She was more hopeful and the way she talked about her relationship that ended about a year ago was different and a lot more positive.  She has been trying to focus on herself a lot.  She recently took a trip to [place] which was work-related which went well.  She feels that on a trip she was a lot more focused and she did not have time to think about anything else.  She stopped Abilify as she felt that the higher dose caused more irritability.  She also stopped Vyvanse as she feels that it was not helping her like before.  She went back on phentermine which she feels has helped her a lot more with cravings for chocolates and would like to stay on it.  She is still using Klonopin on as-needed basis and has to take 2 to calm down her anxiety when needed.  She has been sleeping better with trazodone and denies any significant daytime fatigue.  She still struggling with some lack of motivation and doing stuff that she really enjoys.  She likes to be physically active but is having a hard time motivating herself to take her dog on a hike.  She is going on a skiing trip to [place] next week and is really excited for that.  This is the first time she will be going there without her ex-boyfriend and wants to a trip to be for her.  She feels that after a long time she is not thinking about dying or death and is more optimistic.  She denies having  "active or passive thoughts of wanting to hurt herself or others.    Social History:   She is currently single.  She ended a 4-year long relationship in 9/2017.  She has been  and  x1 and has 2 sons from that marriage.  Her older son lives in [place] and younger son lives in [place].  She is close with both her kids and her siblings - 3 sisters and a brother.  She lives alone in [place].  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  She also works part-time at a local [employer] in the [place].    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16 after successfully completing an inpatient rehab in [place]  Nicotine - Denies  Illicit drugs - Denies    Past Medication Trials:  Prozac (ineffective), Nortriptyline, Effexor  mg (initally effective), Zoloft 200 mg (ineffective), Lexapro 20 mg (stopped after she had acute drop in platelet count while she was on Bactrim), Abilify 10 mg (s/e - \"made me mean\"), Vyvanse 60 mg (partially effective for binge eating disorder)    Medications:  Current Outpatient Prescriptions   Medication Sig Dispense Refill   • phentermine (ADIPEX-P) 37.5 MG tablet Take 37.5 mg by mouth every morning before breakfast.     • traZODone (DESYREL) 50 MG Tab Take 0.5-1 Tabs by mouth at bedtime as needed for Sleep. 30 Tab 2   • FLUoxetine (PROZAC) 20 MG Cap Take 1 Cap by mouth every day. 30 Cap 1   • clonazePAM (KLONOPIN) 0.5 MG Tab Take 1 Tab by mouth 2 times a day as needed (anxiety) for up to 30 days. 60 Tab 2   • levothyroxine (SYNTHROID) 112 MCG Tab Take 112 mcg by mouth Every morning on an empty stomach.     • buPROPion (WELLBUTRIN XL) 150 MG XL tablet Take 3 Tabs by mouth every morning. 90 Tab 1   • Lisdexamfetamine Dimesylate 60 MG Cap Take 1 Cap by mouth every morning for 30 days. (Patient not taking: Reported on 4/19/2019) 30 Cap 0   • zolmitriptan (ZOMIG) 2.5 MG Tab Take 2.5 mg by mouth 1 time daily as " "needed for Headache.     • valacyclovir (VALTREX) 1 GM Tab Take 1,000 mg by mouth 2 times a day.     • therapeutic multivitamin-minerals (THERAGRAN-M) Tab Take 1 Tab by mouth every day.     • pseudoephedrine (SUDAFED) 30 MG Tab Take 60 mg by mouth every four hours as needed for Congestion.       No current facility-administered medications for this visit.        Review Of Systems:    Constitutional - Positive for fatigue  Respiratory - Negative for shortness of breath, cough  CVS - Negative for chest pain, palpitations  GI - Negative for nausea, vomiting, abdominal pain, diarrhea, constipation  Musculoskeletal - Negative for back pain  Neurological - Negative for headaches   Psychiatric - Positive for depression, anxiety    Physical Examination:  Vital signs: /80   Ht 1.778 m (5' 10\")   Wt 73.9 kg (163 lb)   BMI 23.39 kg/m²     Musculoskeletal: Normal gait. No abnormal movements.     Mental Status Evaluation:   General: Middle aged white female, teary eyed, dressed in casual attire, good grooming and hygiene, in no apparent distress, calm and cooperative, intermittent eye contact, psychomotor retardation  Orientation: Alert and oriented to person, place and time  Recent and remote memory: Grossly intact  Attention span and concentration: Grossly intact  Speech: Spontaneous, normal rate, rhythm and tone  Thought Process: Linear, logical and goal directed  Thought Content: Denies passive active suicidal or homicidal ideations, intent or plan  Perception: Denies auditory or visual hallucinations. No delusions noted  Associations: Intact  Language: Appropriate  Fund of knowledge and vocabulary: Grossly adequate  Mood: \"am feeling little better\"  Affect: Dysphoric, mood congruent  Insight: Good  Judgment: Good    Depression screening:  Depression Screen (PHQ-2/PHQ-9) 11/17/2017 4/4/2018 12/11/2018   PHQ-2 Total Score 2 - -   PHQ-2 Total Score - 6 6   PHQ-9 Total Score 6 - -   PHQ-9 Total Score - 12 16 "     Interpretation of PHQ-9 Total Score   Score Severity   1-4 No Depression   5-9 Mild Depression   10-14 Moderate Depression   15-19 Moderately Severe Depression   20-27 Severe Depression    Medical Records/Labs/Diagnostic Tests Reviewed:  [place]  records - appropriate refills, no abuse suspected       Impression:  1. Major depressive disorder, recurrent, severe without psychotic symptoms - slight improvement  2. Binge eating disorder, moderate - stable  3. Generalized anxiety disorder - stable  4. Bereavement (mother  18) - stable    Plan:  1.  Discontinue Abilify due to side effects  2.  Continue Wellbutrin  mg for depression, anxiety and binge eating disorder  3.  Start Prozac 20 mg daily for depression, anxiety and binge eating disorder  4.  Continue Trazodone 25-50 mg at bedtime as needed for sleep  5.  Continue Klonopin 0.5 mg twice daily as needed for anxiety.  I have advised her to take 0.5 mg in the morning since she still struggling with anxiety on a daily basis and to use other 0.5 mg on as needed basis.  6.  She has an appointment with [facility] to discuss TMS but she is not sure if she wants to pursue that or not.  7.  Provided supportive psychotherapy (> 16 minutes).  Discussed her recent improvements and changes in the way she is thinking about her last relationship and breakup)    Return to clinic in 4 weeks or sooner if symptoms worsen    The proposed treatment plan was discussed with the patient who was provided the opportunity to ask questions and make suggestions regarding alternative treatment. Patient verbalized understanding and expressed agreement with the plan.     Opal Garcia M.D.  19    This note was created using voice recognition software (Dragon). The accuracy of the dictation is limited by the abilities of the software. I have reviewed the note prior to signing, however some errors in grammar and context are still possible. If you have any questions  related to this note please do not hesitate to contact our office.

## 2019-05-03 ENCOUNTER — OFFICE VISIT (OUTPATIENT)
Dept: BEHAVIORAL HEALTH | Facility: CLINIC | Age: 59
End: 2019-05-03
Payer: COMMERCIAL

## 2019-05-03 VITALS
HEART RATE: 88 BPM | SYSTOLIC BLOOD PRESSURE: 113 MMHG | DIASTOLIC BLOOD PRESSURE: 66 MMHG | WEIGHT: 156 LBS | HEIGHT: 70 IN | BODY MASS INDEX: 22.33 KG/M2

## 2019-05-03 DIAGNOSIS — F41.1 GAD (GENERALIZED ANXIETY DISORDER): ICD-10-CM

## 2019-05-03 DIAGNOSIS — Z63.4 BEREAVEMENT: ICD-10-CM

## 2019-05-03 DIAGNOSIS — F33.2 MDD (MAJOR DEPRESSIVE DISORDER), RECURRENT SEVERE, WITHOUT PSYCHOSIS (HCC): ICD-10-CM

## 2019-05-03 DIAGNOSIS — F50.81 BINGE-EATING DISORDER, MODERATE: ICD-10-CM

## 2019-05-03 PROCEDURE — 90833 PSYTX W PT W E/M 30 MIN: CPT | Performed by: PSYCHIATRY & NEUROLOGY

## 2019-05-03 PROCEDURE — 99213 OFFICE O/P EST LOW 20 MIN: CPT | Performed by: PSYCHIATRY & NEUROLOGY

## 2019-05-03 RX ORDER — ZOLMITRIPTAN 5 MG/1
5 TABLET, FILM COATED ORAL
COMMUNITY
Start: 2019-04-29 | End: 2020-04-29

## 2019-05-03 SDOH — SOCIAL STABILITY - SOCIAL INSECURITY: DISSAPEARANCE AND DEATH OF FAMILY MEMBER: Z63.4
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
## 2019-05-21 ENCOUNTER — OFFICE VISIT (OUTPATIENT)
Dept: BEHAVIORAL HEALTH | Facility: CLINIC | Age: 59
End: 2019-05-21
Payer: COMMERCIAL

## 2019-05-21 DIAGNOSIS — F41.1 GAD (GENERALIZED ANXIETY DISORDER): ICD-10-CM

## 2019-05-21 DIAGNOSIS — F33.2 MDD (MAJOR DEPRESSIVE DISORDER), RECURRENT SEVERE, WITHOUT PSYCHOSIS (HCC): ICD-10-CM

## 2019-05-21 PROCEDURE — 90834 PSYTX W PT 45 MINUTES: CPT | Performed by: PSYCHOLOGIST
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
## 2019-06-03 RX ORDER — FLUOXETINE HYDROCHLORIDE 20 MG/1
20 CAPSULE ORAL DAILY
Qty: 90 CAP | Refills: 0 | Status: SHIPPED | OUTPATIENT
Start: 2019-06-03 | End: 2019-07-03

## 2019-06-04 DIAGNOSIS — F41.1 GAD (GENERALIZED ANXIETY DISORDER): ICD-10-CM

## 2019-06-04 RX ORDER — TRAZODONE HYDROCHLORIDE 50 MG/1
25-50 TABLET ORAL NIGHTLY PRN
Qty: 90 TAB | Refills: 0 | Status: SHIPPED | OUTPATIENT
Start: 2019-06-04 | End: 2019-10-24 | Stop reason: SDUPTHER

## 2019-06-04 RX ORDER — CLONAZEPAM 0.5 MG/1
0.5 TABLET ORAL 3 TIMES DAILY PRN
Qty: 70 TAB | Refills: 1 | Status: SHIPPED
Start: 2019-06-04 | End: 2019-06-04 | Stop reason: SDUPTHER

## 2019-06-04 RX ORDER — CLONAZEPAM 0.5 MG/1
0.5 TABLET ORAL 3 TIMES DAILY PRN
Qty: 70 TAB | Refills: 1 | Status: SHIPPED
Start: 2019-06-04 | End: 2019-07-04

## 2019-06-11 ENCOUNTER — OFFICE VISIT (OUTPATIENT)
Dept: BEHAVIORAL HEALTH | Facility: CLINIC | Age: 59
End: 2019-06-11
Payer: COMMERCIAL

## 2019-06-11 DIAGNOSIS — F33.2 MDD (MAJOR DEPRESSIVE DISORDER), RECURRENT SEVERE, WITHOUT PSYCHOSIS (HCC): ICD-10-CM

## 2019-06-11 PROCEDURE — 90834 PSYTX W PT 45 MINUTES: CPT | Performed by: PSYCHOLOGIST

## 2019-06-11 NOTE — BH THERAPY
[facility]  Therapy Progress Note    Patient Name: Sammi Alberto  Patient MRN: [id number]  Today's Date: 6/11/2019     Type of session:Individual psychotherapy  Length of session: 45 minutes  Persons in attendance:Patient    Subjective/New Info:   Sammi Alberto is a 59 year old, white female referred for assessment by Opal Garcia M.D. (psychiatrist) for the treatment of depression, especially as related to the termination of a relationship about a year ago. 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 Patient reported that she has been experiencing significant feelings of wanting to get back together, and she knows that this relationship is very toxic. Patient reported that she has been feeling a bit better. She has not had any contact with her es-boyfriend since last visit and has been talking with another male whom she described as healthy. Talked with patient about co-dependency and importance of not modifying her behavior to satisfy someone else. Also discussed her characteristics for a healthy relationship. Attitude a bit more positive.     Patient did not present in acute distress, although she was quite upset when talking about her relationship. Patient was appropriately groomed and cooperative. Patient was alert and oriented to person, place, and time. Eye contact was appropriate. No abnormalities in attention or concentration were noted. No abnormalities of movement present; psychomotor activity was normal. Speech was fluent and regular in rhythm, rate, volume, and tone. Thought processes were linear, logical, and goal-directed. There was no evidence of thought disorder. No auditory or visual hallucinations. Long and short term memory appeared to be intact. Insight, judgment, and impulse control were deemed to be within normal limits. Reported mood was fairly negative, but hopeful.  Affect was appropriate and congruent with thought content and conversation. Patient denied current suicidal and homicidal ideation in plan, intent, and preparatory behavior.      Objective/Observations:   Participation: Active verbal participation, Attentive, Engaged and Open to feedback   Grooming: Casual and Neat   Cognition: Alert and Fully Oriented   Eye contact: Good   Mood: Depressed and Anxious   Affect: Flexible   Thought process: Logical and Goal-directed   Speech: Rate within normal limits and Volume within normal limits   Other:     Diagnoses:   1. MDD (major depressive disorder), recurrent severe, without psychosis (HCC)         Current risk:   SUICIDE: Low   Homicide: Low   Self-harm: Low   Relapse: Not applicable   Other:    Safety Plan reviewed? Yes   If evidence of imminent risk is present, intervention/plan:     Therapeutic Intervention(s): Cognitive modification, Establish rapport, Leisure and recreation skills, Limit-setting, Stressors assessed and Supportive psychotherapy    Treatment Goal(s)/Objective(s) addressed:   Objective A: Patient will increase activity level by will participating in at least two hours, three times per week in a social or leisure activity with family member or close friend.  Objective B: Patient will express an increase in positive statements about self by will making at least five positive statements per day about self-circumstances.  Objective C: Patient will spend more time with friend in social situations by spending at least thirty minutes four times per week in a social situation, interacting appropriately with a friend.    Reduce Symptoms of Anxiety:  Objective A: Patient will learn one effective technique for dealing with anxiety each week, and utilize it effectively when feeling anxious.  Objective B: Patient will express an increase in positive statements by making at least five positive statements per day about self or current circumstances.  Objective C:  Patient will increase activity level by participating in at least two hours, three times per week in a leisure or social activity.         Progress toward Treatment Goals: Moderate improvement    Plan:  - Continue Individual therapy    Christian Obrien, Ph.D.  6/11/2019

## 2019-07-03 ENCOUNTER — OFFICE VISIT (OUTPATIENT)
Dept: BEHAVIORAL HEALTH | Facility: CLINIC | Age: 59
End: 2019-07-03
Payer: COMMERCIAL

## 2019-07-03 VITALS
DIASTOLIC BLOOD PRESSURE: 72 MMHG | HEIGHT: 69 IN | WEIGHT: 157 LBS | HEART RATE: 79 BPM | SYSTOLIC BLOOD PRESSURE: 124 MMHG | BODY MASS INDEX: 23.25 KG/M2

## 2019-07-03 DIAGNOSIS — Z63.4 BEREAVEMENT: ICD-10-CM

## 2019-07-03 DIAGNOSIS — F50.81 BINGE-EATING DISORDER, MODERATE: ICD-10-CM

## 2019-07-03 DIAGNOSIS — F33.2 MDD (MAJOR DEPRESSIVE DISORDER), RECURRENT SEVERE, WITHOUT PSYCHOSIS (HCC): ICD-10-CM

## 2019-07-03 DIAGNOSIS — F41.1 GAD (GENERALIZED ANXIETY DISORDER): ICD-10-CM

## 2019-07-03 PROCEDURE — 90833 PSYTX W PT W E/M 30 MIN: CPT | Performed by: PSYCHIATRY & NEUROLOGY

## 2019-07-03 PROCEDURE — 99214 OFFICE O/P EST MOD 30 MIN: CPT | Performed by: PSYCHIATRY & NEUROLOGY

## 2019-07-03 RX ORDER — LISDEXAMFETAMINE DIMESYLATE CAPSULES 60 MG/1
60 CAPSULE ORAL EVERY MORNING
Qty: 30 CAP | Refills: 0 | Status: SHIPPED | OUTPATIENT
Start: 2019-07-03 | End: 2019-07-31 | Stop reason: SDUPTHER

## 2019-07-03 RX ORDER — FLUOXETINE HYDROCHLORIDE 40 MG/1
40 CAPSULE ORAL DAILY
Qty: 90 CAP | Refills: 0 | Status: SHIPPED | OUTPATIENT
Start: 2019-07-03 | End: 2019-09-13 | Stop reason: SDUPTHER

## 2019-07-03 SDOH — SOCIAL STABILITY - SOCIAL INSECURITY: DISSAPEARANCE AND DEATH OF FAMILY MEMBER: Z63.4

## 2019-07-03 ASSESSMENT — PATIENT HEALTH QUESTIONNAIRE - PHQ9
SUM OF ALL RESPONSES TO PHQ QUESTIONS 1-9: 13
5. POOR APPETITE OR OVEREATING: 3 - NEARLY EVERY DAY
CLINICAL INTERPRETATION OF PHQ2 SCORE: 4

## 2019-07-03 NOTE — PROGRESS NOTES
PSYCHIATRY FOLLOW-UP NOTE      Chief Complaint   Patient presents with   • Follow-Up     depression, anxiety, eating disorder         History Of Present Illness:  Sammi Alberto is a 59 y.o. old female with major depressive disorder, generalized anxiety disorder, binge eating disorder,  hypothyroidism, s/p lap band surgery in 2010, parathyroidectomy, left ACL and meniscal repair comes in today for follow up, was last seen 2 months ago.  She continues to struggle with depression and is having a hard time feeling good about herself.  She continues to feel hopeless and worthless.  She decided to block her ex-boyfriend's number and has not had any contact with him in the last few months.  She has been on dating website and has been seeing somebody who lives in [place] and it has been going good so far.  She feels that the last relationship has damaged her so much that she is having a hard time trusting herself.  She is struggling with the self-esteem and cannot see what other people are seeing in her.  Her younger son has been living with her for a while until he moves out of state for his job and it has been good for her.  She normally does well when she is around other people but struggles more with her symptoms when she is by herself.  She is having a hard time taking care of her yard which she normally enjoys.  She feels that when she is by herself all of her negative self talk comes back and the only thing that helps is taking Klonopin and going to sleep.  She has been taking more than prescribed amounts of Klonopin to stop her thoughts and get to sleep.  She has been compliant with both Wellbutrin and Prozac.  She has been struggling with her binge eating again and has been eating a lot more candies.  She was recently started on phentermine as an appetite suppressant which was helpful for a while but she does not feel the same benefit anymore.  She would like to go back on Vyvanse at this time.  Sleep has been  "up and down.  She denies having active thoughts of wanting to hurt herself or others.    Social History:   She is currently single.  She ended a 4-year long relationship in 9/2017.  She has been  and  x1 and has 2 sons from that marriage.  She is close with both her kids and her siblings - 3 sisters and a brother.  She lives alone in [place].  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  She also works part-time at a local [employer] in the [facility].    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16 after successfully completing an inpatient rehab in [place]  Nicotine - Denies  Illicit drugs - Denies    Past Medication Trials:  Prozac (ineffective), Nortriptyline, Effexor  mg (initally effective), Zoloft 200 mg (ineffective), Lexapro 20 mg (stopped after she had acute drop in platelet count while she was on Bactrim), Abilify 10 mg (s/e - \"made me mean\"), Vyvanse 60 mg (partially effective for binge eating disorder)    Medications:  Current Outpatient Prescriptions   Medication Sig Dispense Refill   • fluoxetine (PROZAC) 40 MG capsule Take 1 Cap by mouth every day. 90 Cap 0   • Lisdexamfetamine Dimesylate 60 MG Cap Take 1 Cap by mouth every morning for 30 days. 30 Cap 0   • clonazePAM (KLONOPIN) 0.5 MG Tab Take 1 Tab by mouth 3 times a day as needed (anxiety) for up to 30 days. 70 Tab 1   • phentermine (ADIPEX-P) 37.5 MG tablet Take 37.5 mg by mouth every morning before breakfast.     • buPROPion (WELLBUTRIN XL) 150 MG XL tablet Take 3 Tabs by mouth every morning. 90 Tab 1   • therapeutic multivitamin-minerals (THERAGRAN-M) Tab Take 1 Tab by mouth every day.     • levothyroxine (SYNTHROID) 112 MCG Tab Take 112 mcg by mouth Every morning on an empty stomach.     • traZODone (DESYREL) 50 MG Tab Take 0.5-1 Tabs by mouth at bedtime as needed for Sleep. 90 Tab 0   • zolmitriptan (ZOMIG) 5 MG tablet Take 5 mg by mouth.     • " "valacyclovir (VALTREX) 1 GM Tab Take 1,000 mg by mouth 2 times a day.       No current facility-administered medications for this visit.        Review Of Systems:    Constitutional - Positive for fatigue  Respiratory - Negative for shortness of breath, cough  CVS - Negative for chest pain, palpitations  GI - Negative for nausea, vomiting, abdominal pain, diarrhea, constipation  Musculoskeletal - Negative for back pain  Neurological - Positive for headaches   Psychiatric - Positive for depression, anxiety, binge eating    Physical Examination:  Vital signs: /72   Pulse 79   Ht 1.753 m (5' 9\")   Wt 71.2 kg (157 lb)   BMI 23.18 kg/m²     Musculoskeletal: Normal gait. No abnormal movements.     Mental Status Evaluation:   General: Middle aged white female, dressed in casual attire, good grooming and hygiene, in no apparent distress, calm and cooperative, intermittent eye contact, psychomotor retardation  Orientation: Alert and oriented to person, place and time  Recent and remote memory: Grossly intact  Attention span and concentration: Grossly intact  Speech: Spontaneous, normal rate, rhythm and tone  Thought Process: Linear, logical and goal directed  Thought Content: Denies passive active suicidal or homicidal ideations, intent or plan  Perception: Denies auditory or visual hallucinations. No delusions noted  Associations: Intact  Language: Appropriate  Fund of knowledge and vocabulary: Grossly adequate  Mood: \"not well\"  Affect: Dysphoric, mood congruent  Insight: Good  Judgment: Good    Depression screening:  Depression Screen (PHQ-2/PHQ-9) 4/4/2018 12/11/2018 7/3/2019   PHQ-2 Total Score - - -   PHQ-2 Total Score 6 6 4   PHQ-9 Total Score - - -   PHQ-9 Total Score 12 16 13     Interpretation of PHQ-9 Total Score   Score Severity   1-4 No Depression   5-9 Mild Depression   10-14 Moderate Depression   15-19 Moderately Severe Depression   20-27 Severe Depression    Medical Records/Labs/Diagnostic Tests " Reviewed:  [facility] records - appropriate refills, no abuse suspected       Impression:  1. Major depressive disorder, recurrent, severe without psychotic symptoms - worsening   2. Binge eating disorder, moderate - worsening   3. Generalized anxiety disorder - worsening   4. Bereavement (mother  18) - stable    Plan:  1.  Increase Prozac to 40 mg daily for depression, anxiety and binge eating disorder  2.  Continue Wellbutrin  mg for depression and anxiety  3.  Continue Klonopin 0.5 mg up to 3 times daily as needed for anxiety.  I have discussed addictive nature of this medication and advised her not to take more than the prescribed amount.  4.  Restart Vyvanse 60 mg in the morning for binge eating disorder.  Provided her with one prescription for 30 tabs each.  5.  Continue Trazodone 25-50 mg at bedtime as needed for sleep  6.  She will not be seeing Dr. Christian Obrien for psychotherapy anymore but has an appointment with a new therapist [facility] next week with a therapist  7.  Provided supportive psychotherapy (> 16 minutes).  Discussed her last relationship and how it has affected her self-esteem.  Provided encouragement to look at herself and how she is still the same person but more cautious to avoid getting hurt again.    Return to clinic in 4 weeks or sooner if symptoms worsen    The proposed treatment plan was discussed with the patient who was provided the opportunity to ask questions and make suggestions regarding alternative treatment. Patient verbalized understanding and expressed agreement with the plan.     Opal Garcia M.D.  19    This note was created using voice recognition software (Dragon). The accuracy of the dictation is limited by the abilities of the software. I have reviewed the note prior to signing, however some errors in grammar and context are still possible. If you have any questions related to this note please do not hesitate to contact our office.

## 2019-07-22 DIAGNOSIS — F41.1 GAD (GENERALIZED ANXIETY DISORDER): ICD-10-CM

## 2019-07-22 RX ORDER — CLONAZEPAM 0.5 MG/1
0.5 TABLET ORAL 3 TIMES DAILY PRN
Qty: 70 TAB | Refills: 1 | OUTPATIENT
Start: 2019-07-22 | End: 2019-08-21

## 2019-07-31 ENCOUNTER — OFFICE VISIT (OUTPATIENT)
Dept: BEHAVIORAL HEALTH | Facility: CLINIC | Age: 59
End: 2019-07-31
Payer: COMMERCIAL

## 2019-07-31 VITALS
BODY MASS INDEX: 23.7 KG/M2 | DIASTOLIC BLOOD PRESSURE: 68 MMHG | WEIGHT: 160 LBS | SYSTOLIC BLOOD PRESSURE: 119 MMHG | HEIGHT: 69 IN | HEART RATE: 95 BPM

## 2019-07-31 DIAGNOSIS — F33.1 MDD (MAJOR DEPRESSIVE DISORDER), RECURRENT EPISODE, MODERATE (HCC): ICD-10-CM

## 2019-07-31 DIAGNOSIS — F41.1 GAD (GENERALIZED ANXIETY DISORDER): ICD-10-CM

## 2019-07-31 DIAGNOSIS — F50.81 BINGE-EATING DISORDER, MODERATE: ICD-10-CM

## 2019-07-31 PROCEDURE — 99214 OFFICE O/P EST MOD 30 MIN: CPT | Performed by: PSYCHIATRY & NEUROLOGY

## 2019-07-31 PROCEDURE — 90833 PSYTX W PT W E/M 30 MIN: CPT | Performed by: PSYCHIATRY & NEUROLOGY

## 2019-07-31 RX ORDER — LISDEXAMFETAMINE DIMESYLATE CAPSULES 60 MG/1
60 CAPSULE ORAL EVERY MORNING
Qty: 30 CAP | Refills: 0 | Status: SHIPPED | OUTPATIENT
Start: 2019-10-01 | End: 2019-10-31

## 2019-07-31 RX ORDER — BUPROPION HYDROCHLORIDE 150 MG/1
450 TABLET ORAL EVERY MORNING
Qty: 270 TAB | Refills: 0 | Status: SHIPPED | OUTPATIENT
Start: 2019-07-31 | End: 2019-09-13 | Stop reason: SDUPTHER

## 2019-07-31 RX ORDER — CLONAZEPAM 0.5 MG/1
0.25 TABLET ORAL 3 TIMES DAILY PRN
Qty: 70 TAB | Refills: 2 | Status: SHIPPED | OUTPATIENT
Start: 2019-08-19 | End: 2019-09-18

## 2019-07-31 RX ORDER — LISDEXAMFETAMINE DIMESYLATE CAPSULES 60 MG/1
60 CAPSULE ORAL EVERY MORNING
Qty: 30 CAP | Refills: 0 | Status: SHIPPED | OUTPATIENT
Start: 2019-09-02 | End: 2019-10-02

## 2019-07-31 RX ORDER — LISDEXAMFETAMINE DIMESYLATE CAPSULES 60 MG/1
60 CAPSULE ORAL EVERY MORNING
Qty: 30 CAP | Refills: 0 | Status: SHIPPED | OUTPATIENT
Start: 2019-08-04 | End: 2019-09-03

## 2019-07-31 NOTE — PROGRESS NOTES
PSYCHIATRY FOLLOW-UP NOTE      Chief Complaint   Patient presents with   • Follow-Up     depression, anxiety, eating disorder         History Of Present Illness:  Sammi Alberto is a 59 y.o. old female with major depressive disorder, generalized anxiety disorder, binge eating disorder,  hypothyroidism, s/p lap band surgery in 2010, parathyroidectomy, left ACL and meniscal repair comes in today for follow up, was last seen 4 weeks ago.  She is still having ups and downs in regards to her mood and anxiety since her last visit with me.  She feels that the medications are helping her be functional in life.  She is able to perform at both of her jobs and is spending quality time with her friends in town.  She feels that when she is with other people she feels a lot more happier.  However, when she is by herself she is flooded by negative thoughts which add a lot to her depression.  She is still having a hard time getting over her last relationship which ended about 2 years ago.  She is unable to forgive herself for not catching on his behaviors.  She does not think that she can ever have another meaningful relationship as she will never be able to trust the other person.  She has been casually dating a sherrell from [employer] and has been enjoying his company.  She has been playing [employer] as well which she enjoys.  Sleep and appetite continue to be up and down.  She has been back on Vyvanse and feels that it does help her eating behaviors.  She tries not to eat throughout the day and binge eats on candy towards the end of the day as a rationalization.  She knows how to cook but does not cook for herself.  She continues to use Klonopin on as-needed basis to calm down her anxiety especially when she is by herself.  She has been using melatonin along with trazodone and has noticed some daytime sedation.  She did see the new therapist at [facility] twice but does not think that she will be going there anymore.  She  "denies having thoughts of wanting to hurt herself or others.    Social History:   She is currently single.  She ended a 4-year long relationship in 9/2017.  She has been  and  x1 and has 2 sons from that marriage.  She is close with both her kids and her siblings - 3 sisters and a brother.  She lives alone in [place].  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16 after successfully completing an inpatient rehab in [place]  Nicotine - Denies  Illicit drugs - Denies    Past Medication Trials:  Prozac (ineffective), Nortriptyline, Effexor  mg (initally effective), Zoloft 200 mg (ineffective), Lexapro 20 mg (stopped after she had acute drop in platelet count while she was on Bactrim), Abilify 10 mg (s/e - \"made me mean\"), Vyvanse 60 mg (partially effective for binge eating disorder)    Medications:  Current Outpatient Medications   Medication Sig Dispense Refill   • [START ON 8/4/2019] Lisdexamfetamine Dimesylate 60 MG Cap Take 1 Cap by mouth every morning for 30 days. 30 Cap 0   • [START ON 9/2/2019] Lisdexamfetamine Dimesylate 60 MG Cap Take 1 Cap by mouth every morning for 30 days. 30 Cap 0   • [START ON 10/1/2019] Lisdexamfetamine Dimesylate 60 MG Cap Take 1 Cap by mouth every morning for 30 days. 30 Cap 0   • buPROPion (WELLBUTRIN XL) 150 MG XL tablet Take 3 Tabs by mouth every morning. 270 Tab 0   • [START ON 8/19/2019] clonazePAM (KLONOPIN) 0.5 MG Tab Take 0.5 Tabs by mouth 3 times a day as needed (anxiety) for up to 30 days. 70 Tab 2   • fluoxetine (PROZAC) 40 MG capsule Take 1 Cap by mouth every day. 90 Cap 0   • traZODone (DESYREL) 50 MG Tab Take 0.5-1 Tabs by mouth at bedtime as needed for Sleep. 90 Tab 0   • zolmitriptan (ZOMIG) 5 MG tablet Take 5 mg by mouth.     • valacyclovir (VALTREX) 1 GM Tab " "Take 1,000 mg by mouth 2 times a day.     • therapeutic multivitamin-minerals (THERAGRAN-M) Tab Take 1 Tab by mouth every day.     • levothyroxine (SYNTHROID) 112 MCG Tab Take 112 mcg by mouth Every morning on an empty stomach.       No current facility-administered medications for this visit.        Review Of Systems:    Constitutional - Positive for fatigue  Respiratory - Negative for shortness of breath, cough  CVS - Negative for chest pain, palpitations  GI - Negative for nausea, vomiting, abdominal pain, diarrhea, constipation  Musculoskeletal - Negative for back pain  Neurological - Positive for headaches   Psychiatric - Positive for depression, anxiety, binge eating episodes, poor sleep    Physical Examination:  Vital signs: /68   Pulse 95   Ht 1.753 m (5' 9\")   Wt 72.6 kg (160 lb)   BMI 23.63 kg/m²     Musculoskeletal: Normal gait. No abnormal movements.     Mental Status Evaluation:   General: Middle aged white female, tearful once, dressed in casual attire, good grooming and hygiene, in no apparent distress, calm and cooperative, intermittent eye contact, psychomotor retardation  Orientation: Alert and oriented to person, place and time  Recent and remote memory: Grossly intact  Attention span and concentration: Grossly intact  Speech: Spontaneous, normal rate, rhythm and tone  Thought Process: Linear, logical and goal directed  Thought Content: Denies passive active suicidal or homicidal ideations, intent or plan  Perception: Denies auditory or visual hallucinations. No delusions noted  Associations: Intact  Language: Appropriate  Fund of knowledge and vocabulary: Grossly adequate  Mood: \"am okay\"  Affect: Dysphoric, mood congruent  Insight: Good  Judgment: Good    Depression screening:  Depression Screen (PHQ-2/PHQ-9) 4/4/2018 12/11/2018 7/3/2019   PHQ-2 Total Score - - -   PHQ-2 Total Score 6 6 4   PHQ-9 Total Score - - -   PHQ-9 Total Score 12 16 13     Interpretation of PHQ-9 Total Score "   Score Severity   1-4 No Depression   5-9 Mild Depression   10-14 Moderate Depression   15-19 Moderately Severe Depression   20-27 Severe Depression    Medical Records/Labs/Diagnostic Tests Reviewed:  [facility]  records - appropriate refills, no abuse suspected       Impression:  1.  Major depressive disorder, recurrent, moderate - slight improvement   2.  Binge eating disorder, moderate - slight improvement   3.  Generalized anxiety disorder - slight improvement   4.  Bereavement (mother  18) - resolved     Plan:  1.  Continue Wellbutrin XL and Prozac 40 mg daily for depression and anxiety  2.  Continue Vyvanse 60 mg in the morning for binge eating disorder.  Provided her with 3 prescriptions for 30 tabs each.  3.  Continue Klonopin 0.5 mg 3 times daily as needed for anxiety.  Provided her with one prescription for 70 tabs and 2 refills.  4.  Continue Trazodone 25-50 mg at bedtime as needed for sleep.  She has been taking over-the-counter 10 mg Melatonin for sleep and advised her to cut back her dose to 2 mg as it is contributing to daytime sedation.  5.  I have advised her to contact her previous therapist Dr. Mckenna Jimenez to see if she is willing to see her for therapy again.  She has seen 3 therapists after her but is unable to connect with them and is willing to think about it.  6.  Provided supportive psychotherapy (> 16 minutes).  Discussed her trying to find happiness in a relationship rather than putting herself.  Encouraged her to cook for herself every day as a way to take care of her body.    Return to clinic in 4-8 weeks or sooner if symptoms worsen    The proposed treatment plan was discussed with the patient who was provided the opportunity to ask questions and make suggestions regarding alternative treatment. Patient verbalized understanding and expressed agreement with the plan.     Opal Garcia M.D.  19    This note was created using voice recognition software (Dragon). The accuracy  of the dictation is limited by the abilities of the software. I have reviewed the note prior to signing, however some errors in grammar and context are still possible. If you have any questions related to this note please do not hesitate to contact our office.

## 2019-09-11 DIAGNOSIS — F50.81 BINGE-EATING DISORDER, MODERATE: ICD-10-CM

## 2019-09-11 RX ORDER — LISDEXAMFETAMINE DIMESYLATE 60 MG/1
60 CAPSULE ORAL EVERY MORNING
Qty: 30 CAP | Refills: 0 | OUTPATIENT
Start: 2019-09-11 | End: 2019-10-11

## 2019-09-13 ENCOUNTER — PATIENT MESSAGE (OUTPATIENT)
Dept: BEHAVIORAL HEALTH | Facility: CLINIC | Age: 59
End: 2019-09-13

## 2019-09-13 DIAGNOSIS — F33.1 MDD (MAJOR DEPRESSIVE DISORDER), RECURRENT EPISODE, MODERATE (HCC): ICD-10-CM

## 2019-09-13 DIAGNOSIS — F41.1 GAD (GENERALIZED ANXIETY DISORDER): ICD-10-CM

## 2019-09-13 RX ORDER — FLUOXETINE HYDROCHLORIDE 40 MG/1
40 CAPSULE ORAL DAILY
Qty: 90 CAP | Refills: 0 | Status: SHIPPED | OUTPATIENT
Start: 2019-09-13 | End: 2019-11-14 | Stop reason: SDUPTHER

## 2019-09-13 RX ORDER — BUPROPION HYDROCHLORIDE 150 MG/1
450 TABLET ORAL EVERY MORNING
Qty: 270 TAB | Refills: 0 | Status: SHIPPED | OUTPATIENT
Start: 2019-09-13 | End: 2019-11-14 | Stop reason: SDUPTHER

## 2019-10-24 ENCOUNTER — OFFICE VISIT (OUTPATIENT)
Dept: BEHAVIORAL HEALTH | Facility: CLINIC | Age: 59
End: 2019-10-24
Payer: COMMERCIAL

## 2019-10-24 VITALS
WEIGHT: 168 LBS | BODY MASS INDEX: 24.88 KG/M2 | HEART RATE: 81 BPM | SYSTOLIC BLOOD PRESSURE: 105 MMHG | DIASTOLIC BLOOD PRESSURE: 63 MMHG | HEIGHT: 69 IN

## 2019-10-24 DIAGNOSIS — F50.81 BINGE-EATING DISORDER, MODERATE: ICD-10-CM

## 2019-10-24 DIAGNOSIS — F33.1 MDD (MAJOR DEPRESSIVE DISORDER), RECURRENT EPISODE, MODERATE (HCC): ICD-10-CM

## 2019-10-24 DIAGNOSIS — F41.1 GAD (GENERALIZED ANXIETY DISORDER): ICD-10-CM

## 2019-10-24 PROBLEM — Z63.4 BEREAVEMENT: Status: RESOLVED | Noted: 2018-08-27 | Resolved: 2019-10-24

## 2019-10-24 PROCEDURE — 99214 OFFICE O/P EST MOD 30 MIN: CPT | Performed by: PSYCHIATRY & NEUROLOGY

## 2019-10-24 PROCEDURE — 90833 PSYTX W PT W E/M 30 MIN: CPT | Performed by: PSYCHIATRY & NEUROLOGY

## 2019-10-24 RX ORDER — LISDEXAMFETAMINE DIMESYLATE CAPSULES 60 MG/1
60 CAPSULE ORAL EVERY MORNING
Qty: 30 CAP | Refills: 0 | Status: SHIPPED | OUTPATIENT
Start: 2019-10-24 | End: 2019-11-23

## 2019-10-24 RX ORDER — LISDEXAMFETAMINE DIMESYLATE CAPSULES 60 MG/1
60 CAPSULE ORAL EVERY MORNING
Qty: 30 CAP | Refills: 0 | Status: SHIPPED | OUTPATIENT
Start: 2019-11-22 | End: 2019-12-05

## 2019-10-24 RX ORDER — TRAZODONE HYDROCHLORIDE 50 MG/1
25-50 TABLET ORAL NIGHTLY PRN
Qty: 90 TAB | Refills: 0 | Status: SHIPPED | OUTPATIENT
Start: 2019-10-24 | End: 2020-02-20 | Stop reason: SDUPTHER

## 2019-10-24 RX ORDER — CLONAZEPAM 0.5 MG/1
0.5 TABLET ORAL 3 TIMES DAILY PRN
Refills: 2 | COMMUNITY
Start: 2019-10-14 | End: 2019-10-24 | Stop reason: SDUPTHER

## 2019-10-24 RX ORDER — CLONAZEPAM 0.5 MG/1
0.5 TABLET ORAL 3 TIMES DAILY PRN
Qty: 60 TAB | Refills: 2 | Status: SHIPPED | OUTPATIENT
Start: 2019-11-12 | End: 2019-12-12

## 2019-10-24 NOTE — PROGRESS NOTES
"PSYCHIATRY FOLLOW-UP NOTE      Chief Complaint   Patient presents with   • Follow-Up     depression, anxiety         History Of Present Illness:  Sammi Alberto is a 59 y.o. old female with major depressive disorder, generalized anxiety disorder, binge eating disorder,  hypothyroidism, s/p lap band surgery in 2010, parathyroidectomy, left ACL and meniscal repair comes in today for follow up, was last seen 3 months ago.  She continues to struggle with both depression and anxiety and feels that \"this is the best I am ever going to get\".  She is still not able to get over her last relationship even though the program about 2 years ago.  She was in [place] for about 4 weeks for business and came back home about 2 weeks ago.  She decided to contact her ex-boyfriend yesterday and he broke her trust again.  However, she is having a hard time getting over him and feels that he continues to have significant controlled on her day-to-day life.  She avoids meeting other friends she knows that they will be talking about him and she does not want to lie to them.  She is spending most of her day indoors and goes out only to take her dog out for a walk.  She is still engaging in binge eating behaviors and feels that all she eats his bags of candy.  She avoids cooking for herself as she is struggling with motivation.  She has been compliant with her medication and feels that \"they are doing as much as they can\" she is able to get out of the bed every morning because of time.  Sleep and appetite continue to be at baseline.  She denies having thoughts of wanting to hurt herself or others.    Social History:   She is currently single.  She ended a 4-year long relationship in 9/2017.  She has been  and  x1 and has 2 sons from that marriage.  She is close with both her kids and her siblings - 3 sisters and a brother.  She lives alone in [place].  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 30 Cap 0   • [START ON 11/22/2019] Lisdexamfetamine Dimesylate 60 MG Cap Take 1 Cap by mouth every morning for 30 days. 30 Cap 0   • [START ON 11/12/2019] clonazePAM (KLONOPIN) 0.5 MG Tab Take 1 Tab by mouth 3 times a day as needed (anxiety) for up to 30 days. 60 Tab 2   • traZODone (DESYREL) 50 MG Tab Take 0.5-1 Tabs by mouth at bedtime as needed for Sleep. 90 Tab 0   • buPROPion (WELLBUTRIN XL) 150 MG XL tablet Take 3 Tabs by mouth every morning. 270 Tab 0   • fluoxetine (PROZAC) 40 MG capsule Take 1 Cap by mouth every day. 90 Cap 0   • Lisdexamfetamine Dimesylate 60 MG Cap Take 1 Cap by mouth every morning for 30 days. 30 Cap 0   • zolmitriptan (ZOMIG) 5 MG tablet Take 5 mg by mouth.     • valacyclovir (VALTREX) 1 GM Tab Take 1,000 mg by mouth 2 times a day.     • therapeutic multivitamin-minerals (THERAGRAN-M) Tab Take 1 Tab by mouth every day.     • levothyroxine (SYNTHROID) 112 MCG Tab Take 112 mcg by mouth Every morning on an empty stomach.       No current facility-administered medications for this visit.        Review Of Systems:    Constitutional - Positive for fatigue  Respiratory - Negative for shortness of breath, cough  CVS - Negative for chest pain, " "palpitations  GI - Negative for nausea, vomiting, abdominal pain, diarrhea, constipation  Musculoskeletal - Negative for back pain  Neurological - Positive for headaches   Psychiatric - Positive for depression, anxiety, binge eating episodes, poor sleep    Physical Examination:  Vital signs: /63   Pulse 81   Ht 1.753 m (5' 9\")   Wt 76.2 kg (168 lb)   BMI 24.81 kg/m²     Musculoskeletal: Normal gait. No abnormal movements.     Mental Status Evaluation:   General: Middle aged white female, tearful at times, dressed in casual attire, good grooming and hygiene, in no apparent distress, calm and cooperative, intermittent eye contact, psychomotor retardation  Orientation: Alert and oriented to person, place and time  Recent and remote memory: Grossly intact  Attention span and concentration: Grossly intact  Speech: Spontaneous, normal rate, rhythm and tone  Thought Process: Linear, logical and goal directed  Thought Content: Denies passive active suicidal or homicidal ideations, intent or plan  Perception: Denies auditory or visual hallucinations. No delusions noted  Associations: Intact  Language: Appropriate  Fund of knowledge and vocabulary: Grossly adequate  Mood: \"am okay\"  Affect: Dysphoric, mood congruent  Insight: Good  Judgment: Good    Depression screening:  Depression Screen (PHQ-2/PHQ-9) 4/4/2018 12/11/2018 7/3/2019   PHQ-2 Total Score - - -   PHQ-2 Total Score 6 6 4   PHQ-9 Total Score - - -   PHQ-9 Total Score 12 16 13     Interpretation of PHQ-9 Total Score   Score Severity   1-4 No Depression   5-9 Mild Depression   10-14 Moderate Depression   15-19 Moderately Severe Depression   20-27 Severe Depression    Medical Records/Labs/Diagnostic Tests Reviewed:  [facility]  records - appropriate refills, no abuse suspected       Impression:  1.  Major depressive disorder, recurrent, moderate - stable  2.  Binge eating disorder, moderate - worsening  3.  Generalized anxiety disorder - stable     Plan:  1.  " Continue Wellbutrin  mg and Prozac 40 mg daily for depression and anxiety  2.  Continue Vyvanse 60 mg in the morning for binge eating disorder.  Provided her with 2 prescriptions for 30 tabs each.  3.  Continue Klonopin 0.5 mg 3 times daily as needed for anxiety.  Provided her with one prescription for 70 tabs and 2 refills.  4.  Continue Trazodone 25-50 mg at bedtime as needed for sleep.   5.  She has asked me to reach out to her previous psychologist, Dr. Mckenna Jimenez to see if she can take her back as a patient.  I will reach out to Dr. Jimenez to give her an update on how she has been doing next week.  6.  Provided supportive psychotherapy (> 16 minutes).  Discussed how her last relationship has been addicting for her in the controlled she is letting him have over him.  Encouraged her to think about the negative stuff that has happened to her but this relationship and to focus on that rather than left that she feels for him.    Return to clinic in 4 weeks or sooner if symptoms worsen    The proposed treatment plan was discussed with the patient who was provided the opportunity to ask questions and make suggestions regarding alternative treatment. Patient verbalized understanding and expressed agreement with the plan.     Opal Garcia M.D.  10/24/19    This note was created using voice recognition software (Dragon). The accuracy of the dictation is limited by the abilities of the software. I have reviewed the note prior to signing, however some errors in grammar and context are still possible. If you have any questions related to this note please do not hesitate to contact our office.

## 2019-11-14 ENCOUNTER — OFFICE VISIT (OUTPATIENT)
Dept: BEHAVIORAL HEALTH | Facility: CLINIC | Age: 59
End: 2019-11-14
Payer: COMMERCIAL

## 2019-11-14 VITALS
BODY MASS INDEX: 25.18 KG/M2 | SYSTOLIC BLOOD PRESSURE: 136 MMHG | WEIGHT: 170 LBS | HEART RATE: 100 BPM | DIASTOLIC BLOOD PRESSURE: 71 MMHG | HEIGHT: 69 IN

## 2019-11-14 DIAGNOSIS — F41.1 GAD (GENERALIZED ANXIETY DISORDER): ICD-10-CM

## 2019-11-14 DIAGNOSIS — F50.81 BINGE-EATING DISORDER, MODERATE: ICD-10-CM

## 2019-11-14 DIAGNOSIS — F33.1 MDD (MAJOR DEPRESSIVE DISORDER), RECURRENT EPISODE, MODERATE (HCC): ICD-10-CM

## 2019-11-14 PROCEDURE — 90833 PSYTX W PT W E/M 30 MIN: CPT | Performed by: PSYCHIATRY & NEUROLOGY

## 2019-11-14 PROCEDURE — 99214 OFFICE O/P EST MOD 30 MIN: CPT | Performed by: PSYCHIATRY & NEUROLOGY

## 2019-11-14 RX ORDER — FLUOXETINE HYDROCHLORIDE 40 MG/1
40 CAPSULE ORAL DAILY
Qty: 90 CAP | Refills: 0 | Status: SHIPPED
Start: 2019-11-14 | End: 2020-02-20

## 2019-11-14 RX ORDER — LISDEXAMFETAMINE DIMESYLATE CAPSULES 60 MG/1
60 CAPSULE ORAL EVERY MORNING
Qty: 30 CAP | Refills: 0 | Status: SHIPPED | OUTPATIENT
Start: 2019-11-14 | End: 2019-12-14

## 2019-11-14 RX ORDER — BUPROPION HYDROCHLORIDE 150 MG/1
450 TABLET ORAL EVERY MORNING
Qty: 270 TAB | Refills: 0 | Status: SHIPPED | OUTPATIENT
Start: 2019-11-14 | End: 2020-03-26 | Stop reason: SDUPTHER

## 2019-11-14 NOTE — PROGRESS NOTES
PSYCHIATRY FOLLOW-UP NOTE      Chief Complaint   Patient presents with   • Follow-Up     depression, anxiety         History Of Present Illness:  Sammi Alberto is a 59 y.o. old female with major depressive disorder, generalized anxiety disorder, binge eating disorder,  hypothyroidism, s/p lap band surgery in 2010, parathyroidectomy, left ACL and meniscal repair comes in today for follow up, was last seen 3 weeks ago.  She has been feeling the same in regards to her depression and anxiety.  She continues to have good and bad days but struggles with self-care.  She has been in touch with her ex-boyfriend a few times which really affects her mood and anxiety.  She is still having episodes of binge eating on candy and there are days when she does not eat anything other than candy.  She struggles to go out of her home and stays indoors as much as she can.  She does try and catch up with her friends and enjoys when she is with them.  Sleep has been all right and she is not using trazodone on a regular basis.  She has been really busy at her workplace and will be traveling at the end of this month.  She has been compliant with both Wellbutrin and Prozac.  She continues to use Klonopin on a daily basis as well.     Social History:   She is currently single.  She ended a 4-year long relationship in 9/2017.  She has been  and  x1 and has 2 sons from that marriage.  She is close with both her kids and her siblings - 3 sisters and a brother.  She lives alone in [place].  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16 after successfully completing an inpatient rehab in [place]  Nicotine - Denies  Illicit drugs - Denies    Past Medication Trials:  Prozac (ineffective), Nortriptyline, Effexor  mg (initally effective),  "Zoloft 200 mg (ineffective), Lexapro 20 mg (stopped after she had acute drop in platelet count while she was on Bactrim), Abilify 10 mg (s/e - \"made me mean\"), Vyvanse 60 mg (partially effective for binge eating disorder)    Medications:  Current Outpatient Medications   Medication Sig Dispense Refill   • Lisdexamfetamine Dimesylate 60 MG Cap Take 1 Cap by mouth every morning for 30 days. 30 Cap 0   • [START ON 11/22/2019] Lisdexamfetamine Dimesylate 60 MG Cap Take 1 Cap by mouth every morning for 30 days. 30 Cap 0   • clonazePAM (KLONOPIN) 0.5 MG Tab Take 1 Tab by mouth 3 times a day as needed (anxiety) for up to 30 days. 60 Tab 2   • traZODone (DESYREL) 50 MG Tab Take 0.5-1 Tabs by mouth at bedtime as needed for Sleep. 90 Tab 0   • buPROPion (WELLBUTRIN XL) 150 MG XL tablet Take 3 Tabs by mouth every morning. 270 Tab 0   • fluoxetine (PROZAC) 40 MG capsule Take 1 Cap by mouth every day. 90 Cap 0   • zolmitriptan (ZOMIG) 5 MG tablet Take 5 mg by mouth.     • therapeutic multivitamin-minerals (THERAGRAN-M) Tab Take 1 Tab by mouth every day.     • levothyroxine (SYNTHROID) 112 MCG Tab Take 112 mcg by mouth Every morning on an empty stomach.     • valacyclovir (VALTREX) 1 GM Tab Take 1,000 mg by mouth 2 times a day.       No current facility-administered medications for this visit.        Review Of Systems:    Constitutional - Positive for fatigue  Respiratory - Negative for shortness of breath, cough  CVS - Negative for chest pain, palpitations  GI - Negative for nausea, vomiting, abdominal pain, diarrhea, constipation  Musculoskeletal - Negative for back pain  Neurological - Positive for headaches   Psychiatric - Positive for depression, anxiety, binge eating episodes, poor sleep    Physical Examination:  Vital signs: /71   Pulse 100   Ht 1.753 m (5' 9\")   Wt 77.1 kg (170 lb)   BMI 25.10 kg/m²     Musculoskeletal: Normal gait. No abnormal movements.     Mental Status Evaluation:   General: Middle aged " "white female, tearful at times, dressed in casual attire, good grooming and hygiene, in no apparent distress, calm and cooperative, intermittent eye contact, psychomotor retardation  Orientation: Alert and oriented to person, place and time  Recent and remote memory: Grossly intact  Attention span and concentration: Grossly intact  Speech: Spontaneous, normal rate, rhythm and tone  Thought Process: Linear, logical and goal directed  Thought Content: Denies passive active suicidal or homicidal ideations, intent or plan  Perception: Denies auditory or visual hallucinations. No delusions noted  Associations: Intact  Language: Appropriate  Fund of knowledge and vocabulary: Grossly adequate  Mood: \"am fine\"  Affect: Dysphoric, mood congruent  Insight: Good  Judgment: Good    Depression screening:  Depression Screen (PHQ-2/PHQ-9) 2018 2018 7/3/2019   PHQ-2 Total Score - - -   PHQ-2 Total Score 6 6 4   PHQ-9 Total Score - - -   PHQ-9 Total Score 12 16 13     Interpretation of PHQ-9 Total Score   Score Severity   1-4 No Depression   5-9 Mild Depression   10-14 Moderate Depression   15-19 Moderately Severe Depression   20-27 Severe Depression    Medical Records/Labs/Diagnostic Tests Reviewed:  [facility] records - appropriate refills, no abuse suspected       Impression:  1.  Major depressive disorder, recurrent, moderate - stable  2.  Binge eating disorder, moderate - stable  3.  Generalized anxiety disorder - stable     Plan:  1.  Continue Wellbutrin  mg and Prozac 40 mg daily for depression and anxiety  2.  Continue Vyvanse 60 mg in the morning for binge eating disorder.  Provided her with 1 prescription for 30 tablets.  She still had both her prescriptions from her last appointment which are  and she returned those 2 prescriptions.  3.  Continue Klonopin 0.5 mg 3 times daily as needed for anxiety.  Not refilled today  4.  Continue Trazodone 25-50 mg at bedtime as needed for sleep.   5.  I reached out " to her previous psychologist, Dr. Jimenez and I informed the patient of the same as well.  I have encouraged her to reach out to her to see if she is willing to take her back on as her patient or not.  6.  Provided supportive psychotherapy (> 16 minutes).  Discussed how she is hurting herself and she reaches out to her ex-boyfriend.  Encouraged self-care and advised her to get a habit calendar so that she can start doing more healthy things for herself.    Return to clinic in 4 weeks or sooner if symptoms worsen    The proposed treatment plan was discussed with the patient who was provided the opportunity to ask questions and make suggestions regarding alternative treatment. Patient verbalized understanding and expressed agreement with the plan.     Opal Garcia M.D.  11/14/19    This note was created using voice recognition software (Dragon). The accuracy of the dictation is limited by the abilities of the software. I have reviewed the note prior to signing, however some errors in grammar and context are still possible. If you have any questions related to this note please do not hesitate to contact our office.

## 2019-12-05 ENCOUNTER — OFFICE VISIT (OUTPATIENT)
Dept: BEHAVIORAL HEALTH | Facility: CLINIC | Age: 59
End: 2019-12-05
Payer: COMMERCIAL

## 2019-12-05 VITALS
DIASTOLIC BLOOD PRESSURE: 75 MMHG | BODY MASS INDEX: 26.51 KG/M2 | HEART RATE: 96 BPM | WEIGHT: 179 LBS | SYSTOLIC BLOOD PRESSURE: 124 MMHG | HEIGHT: 69 IN

## 2019-12-05 DIAGNOSIS — F33.1 MDD (MAJOR DEPRESSIVE DISORDER), RECURRENT EPISODE, MODERATE (HCC): ICD-10-CM

## 2019-12-05 DIAGNOSIS — F41.1 GAD (GENERALIZED ANXIETY DISORDER): ICD-10-CM

## 2019-12-05 DIAGNOSIS — F50.81 BINGE-EATING DISORDER, MODERATE: ICD-10-CM

## 2019-12-05 PROCEDURE — 99214 OFFICE O/P EST MOD 30 MIN: CPT | Performed by: PSYCHIATRY & NEUROLOGY

## 2019-12-05 RX ORDER — LISDEXAMFETAMINE DIMESYLATE CAPSULES 60 MG/1
60 CAPSULE ORAL EVERY MORNING
Qty: 30 CAP | Refills: 0 | Status: SHIPPED | OUTPATIENT
Start: 2020-01-17 | End: 2020-02-16

## 2019-12-05 RX ORDER — LISDEXAMFETAMINE DIMESYLATE CAPSULES 60 MG/1
60 CAPSULE ORAL EVERY MORNING
Qty: 30 CAP | Refills: 0 | Status: SHIPPED | OUTPATIENT
Start: 2019-12-19 | End: 2019-12-05

## 2019-12-06 NOTE — PROGRESS NOTES
PSYCHIATRY FOLLOW-UP NOTE      Chief Complaint   Patient presents with   • Follow-Up     depression, anxiety         History Of Present Illness:  Sammi Alberto is a 59 y.o. old female with major depressive disorder, generalized anxiety disorder, binge eating disorder,  hypothyroidism, s/p lap band surgery in 2010, parathyroidectomy, left ACL and meniscal repair comes in today for follow up, was last seen 3 weeks ago.  She is still struggling with her mood and is taking it on a day by day basis.  She did not contact her ex-boyfriend since her last appointment with me but still struggles with the loss of that relationship.  She also did not reach out to her previous therapist says she does not want another disappointment in case she states no.  She has been traveling a lot and is [place].  She has been compliant with her medications.  She takes Vyvanse but is still struggling with her binge eating.  She is mainly eating sugars and candy.  She is also struggling with her lack of motivation and wants to spend most of her time at home if she is not working.  She denies having thoughts of wanting to hurt herself or others.    Social History:   She is currently single.  She ended a 4-year long relationship in 9/2017.  She has been  and  x1 and has 2 sons from that marriage.  She is close with both her kids and her siblings - 3 sisters and a brother.  She lives alone in [place].  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16 after successfully completing an inpatient rehab in [place]  Nicotine - Denies  Illicit drugs - Denies    Past Medication Trials:  Prozac (ineffective), Nortriptyline, Effexor  mg (initally effective), Zoloft 200 mg (ineffective),  "Lexapro 20 mg (stopped after she had acute drop in platelet count while she was on Bactrim), Abilify 10 mg (s/e - \"made me mean\"), Vyvanse 60 mg (partially effective for binge eating disorder)    Medications:  Current Outpatient Medications   Medication Sig Dispense Refill   • Lisdexamfetamine Dimesylate 60 MG Cap Take 1 Cap by mouth every morning for 30 days. 30 Cap 0   • buPROPion (WELLBUTRIN XL) 150 MG XL tablet Take 3 Tabs by mouth every morning. 270 Tab 0   • fluoxetine (PROZAC) 40 MG capsule Take 1 Cap by mouth every day. 90 Cap 0   • Lisdexamfetamine Dimesylate 60 MG Cap Take 1 Cap by mouth every morning for 30 days. 30 Cap 0   • clonazePAM (KLONOPIN) 0.5 MG Tab Take 1 Tab by mouth 3 times a day as needed (anxiety) for up to 30 days. 60 Tab 2   • traZODone (DESYREL) 50 MG Tab Take 0.5-1 Tabs by mouth at bedtime as needed for Sleep. 90 Tab 0   • zolmitriptan (ZOMIG) 5 MG tablet Take 5 mg by mouth.     • valacyclovir (VALTREX) 1 GM Tab Take 1,000 mg by mouth 2 times a day.     • therapeutic multivitamin-minerals (THERAGRAN-M) Tab Take 1 Tab by mouth every day.     • levothyroxine (SYNTHROID) 112 MCG Tab Take 112 mcg by mouth Every morning on an empty stomach.       No current facility-administered medications for this visit.        Review Of Systems:    Constitutional - Positive for fatigue  Respiratory - Negative for shortness of breath, cough  CVS - Negative for chest pain, palpitations  GI - Negative for nausea, vomiting, abdominal pain, diarrhea, constipation  Musculoskeletal - Negative for back pain  Neurological - Positive for headaches   Psychiatric - Positive for depression, anxiety, binge eating episodes, poor sleep    Physical Examination:  Vital signs: /75   Pulse 96   Ht 1.753 m (5' 9\")   Wt 81.2 kg (179 lb)   BMI 26.43 kg/m²     Musculoskeletal: Normal gait. No abnormal movements.     Mental Status Evaluation:   General: Middle aged white female, tearful at times, dressed in casual " "attire, good grooming and hygiene, in no apparent distress, calm and cooperative, intermittent eye contact, psychomotor retardation  Orientation: Alert and oriented to person, place and time  Recent and remote memory: Grossly intact  Attention span and concentration: Grossly intact  Speech: Spontaneous, normal rate, rhythm and tone  Thought Process: Linear, logical and goal directed  Thought Content: Denies passive active suicidal or homicidal ideations, intent or plan  Perception: Denies auditory or visual hallucinations. No delusions noted  Associations: Intact  Language: Appropriate  Fund of knowledge and vocabulary: Grossly adequate  Mood: \"living day by day\"  Affect: Dysphoric, mood congruent  Insight: Good  Judgment: Good    Depression screening:  Depression Screen (PHQ-2/PHQ-9) 4/4/2018 12/11/2018 7/3/2019   PHQ-2 Total Score - - -   PHQ-2 Total Score 6 6 4   PHQ-9 Total Score - - -   PHQ-9 Total Score 12 16 13     Interpretation of PHQ-9 Total Score   Score Severity   1-4 No Depression   5-9 Mild Depression   10-14 Moderate Depression   15-19 Moderately Severe Depression   20-27 Severe Depression    Medical Records/Labs/Diagnostic Tests Reviewed:  [facility] records - appropriate refills, no abuse suspected       Impression:  1.  Major depressive disorder, recurrent, moderate - stable  2.  Binge eating disorder, moderate - stable  3.  Generalized anxiety disorder - stable     Plan:  1.  Continue Wellbutrin  mg and Prozac 40 mg daily for depression and anxiety  2.  Continue Vyvanse 60 mg in the morning for binge eating disorder.  Provided her with 2 prescriptions for 30 tabs each.  She is on a good dose of Vyvanse but is still struggling with her binge eating episodes.  Discussed stopping it completely for a month to see how her eating patterns are and she is up for it at her next appointment.  3.  Continue Klonopin 0.5 mg 2-3 times daily as needed for anxiety.  Not refilled today  4.  Continue Trazodone " 25-50 mg at bedtime as needed for sleep.   5.  Encouraged her to start using her habit calendar and being more physically active which does help with her depression and anxiety    Return to clinic in 4-6 weeks or sooner if symptoms worsen    The proposed treatment plan was discussed with the patient who was provided the opportunity to ask questions and make suggestions regarding alternative treatment. Patient verbalized understanding and expressed agreement with the plan.     Opal Garcia M.D.  12/05/19    This note was created using voice recognition software (Dragon). The accuracy of the dictation is limited by the abilities of the software. I have reviewed the note prior to signing, however some errors in grammar and context are still possible. If you have any questions related to this note please do not hesitate to contact our office.

## 2020-01-15 DIAGNOSIS — F50.81 BINGE-EATING DISORDER, MODERATE: ICD-10-CM

## 2020-01-17 RX ORDER — LISDEXAMFETAMINE DIMESYLATE CAPSULES 60 MG/1
60 CAPSULE ORAL EVERY MORNING
Qty: 30 CAP | Refills: 0 | OUTPATIENT
Start: 2020-01-17 | End: 2020-02-16

## 2020-01-30 ENCOUNTER — OFFICE VISIT (OUTPATIENT)
Dept: BEHAVIORAL HEALTH | Facility: CLINIC | Age: 60
End: 2020-01-30
Payer: COMMERCIAL

## 2020-01-30 VITALS
SYSTOLIC BLOOD PRESSURE: 142 MMHG | WEIGHT: 171 LBS | HEART RATE: 85 BPM | DIASTOLIC BLOOD PRESSURE: 78 MMHG | BODY MASS INDEX: 25.33 KG/M2 | HEIGHT: 69 IN

## 2020-01-30 DIAGNOSIS — F41.1 GAD (GENERALIZED ANXIETY DISORDER): ICD-10-CM

## 2020-01-30 DIAGNOSIS — F33.1 MDD (MAJOR DEPRESSIVE DISORDER), RECURRENT EPISODE, MODERATE (HCC): ICD-10-CM

## 2020-01-30 DIAGNOSIS — F50.81 BINGE-EATING DISORDER, MODERATE: ICD-10-CM

## 2020-01-30 PROCEDURE — 90833 PSYTX W PT W E/M 30 MIN: CPT | Performed by: PSYCHIATRY & NEUROLOGY

## 2020-01-30 PROCEDURE — 99214 OFFICE O/P EST MOD 30 MIN: CPT | Performed by: PSYCHIATRY & NEUROLOGY

## 2020-01-30 RX ORDER — CLONAZEPAM 0.5 MG/1
0.5 TABLET ORAL 2 TIMES DAILY PRN
COMMUNITY
Start: 2020-01-23 | End: 2020-02-20

## 2020-01-30 NOTE — PROGRESS NOTES
PSYCHIATRY FOLLOW-UP NOTE      Chief Complaint   Patient presents with   • Follow-Up     anxiety, depression, eating disorder         History Of Present Illness:  Sammi Alberto is a 59 y.o. old female with major depressive disorder, generalized anxiety disorder, binge eating disorder,  hypothyroidism, s/p lap band surgery in 2010, parathyroidectomy, left ACL and meniscal repair comes in today for follow up, was last seen 6 weeks ago.  She still struggling with depression and anxiety.  She did all right during [place]  She is still struggling with lack of motivation and doing anything for herself.  She just waits for the day to get over so that she can go to bed and avoids going out as much as she can.  She has noticed that she feels anxious when she is out even for kickboxing or racquetball and at times just returned back and she is unable to handle the anxiety.  She can templates for days before going out anywhere.  She has not skied even once the season even though she likes skiing a lot.  She has a lot of good memories with her last boyfriend and that would skied together and feels that that is keeping her from enjoying it anymore.  She has been doing better in regards to her eating.  She is completely eliminated candy and chocolate from her diet and has lost weight and is feeling good about it.  She continues to have this passive thoughts of not wanting to exist but denies any active thoughts of wanting to hurt herself or others.  She is able to state that her kids are her biggest support system and she would not do anything to herself because of them.    Social History:   She is currently single.  She ended a 4-year long relationship in 9/2017.  She has been  and  x1 and has 2 sons from that marriage.  She is close with both her kids and her siblings - 3 sisters and a brother.  She lives alone in [place].  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     • Lisdexamfetamine Dimesylate 60 MG Cap Take 1 Cap by mouth every morning for 30 days. 30 Cap 0   • buPROPion (WELLBUTRIN XL) 150 MG XL tablet Take 3 Tabs by mouth every morning. 270 Tab 0   • fluoxetine (PROZAC) 40 MG capsule Take 1 Cap by mouth every day. 90 Cap 0   • traZODone (DESYREL) 50 MG Tab Take 0.5-1 Tabs by mouth at bedtime as needed for Sleep. 90 Tab 0   • zolmitriptan (ZOMIG) 5 MG tablet Take 5 mg by mouth.     • valacyclovir (VALTREX) 1 GM Tab Take 1,000 mg by mouth 2 times a day.     • therapeutic multivitamin-minerals (THERAGRAN-M) Tab Take 1 Tab by mouth every day.     • levothyroxine (SYNTHROID) 112 MCG Tab Take 112 mcg by mouth Every morning on an empty stomach.       No current facility-administered medications for this visit.        Review Of Systems:    Constitutional - Positive for fatigue  Respiratory - Negative for shortness of breath, cough  CVS - Negative for chest pain, palpitations  GI - Negative for nausea, vomiting, abdominal pain, diarrhea, constipation  Musculoskeletal - Negative for back pain  Neurological - Positive for headaches   Psychiatric - Positive for depression, anxiety, binge eating episodes, " "poor sleep    Physical Examination:  Vital signs: /78   Pulse 85   Ht 1.753 m (5' 9\")   Wt 77.6 kg (171 lb)   BMI 25.25 kg/m²     Musculoskeletal: Normal gait. No abnormal movements.     Mental Status Evaluation:   General: Middle aged white female, dressed in casual attire, good grooming and hygiene, in no apparent distress, calm and cooperative, intermittent eye contact, psychomotor retardation  Orientation: Alert and oriented to person, place and time  Recent and remote memory: Grossly intact  Attention span and concentration: Grossly intact  Speech: Spontaneous, normal rate, rhythm and tone  Thought Process: Linear, logical and goal directed  Thought Content: Denies passive active suicidal or homicidal ideations, intent or plan  Perception: Denies auditory or visual hallucinations. No delusions noted  Associations: Intact  Language: Appropriate  Fund of knowledge and vocabulary: Grossly adequate  Mood: \"alright\"  Affect: Dysphoric, mood congruent  Insight: Good  Judgment: Good    Depression screening:  Depression Screen (PHQ-2/PHQ-9) 4/4/2018 12/11/2018 7/3/2019   PHQ-2 Total Score - - -   PHQ-2 Total Score 6 6 4   PHQ-9 Total Score - - -   PHQ-9 Total Score 12 16 13     Interpretation of PHQ-9 Total Score   Score Severity   1-4 No Depression   5-9 Mild Depression   10-14 Moderate Depression   15-19 Moderately Severe Depression   20-27 Severe Depression    Medical Records/Labs/Diagnostic Tests Reviewed:  [facility] records - appropriate refills, no abuse suspected       Impression:  1.  Major depressive disorder, recurrent, moderate - stable  2.  Binge eating disorder, moderate - improving  3.  Generalized anxiety disorder - stable     Plan:  1.  Continue Wellbutrin  mg and Prozac 40 mg daily for depression and anxiety  2.  Continue Vyvanse 60 mg in the morning for binge eating disorder.  Not refilled today   3.  Continue Klonopin 0.5 mg 2 times daily as needed for anxiety.  Not refilled today  4.  " Continue Trazodone 25-50 mg at bedtime as needed for sleep.   5.  Provided supportive psychotherapy (> 16 minutes).  Discussed happiness and depression and her struggles with it for a while.  Encouraged her to start going to AA meetings again as she has good support from her group in [place].  The AA meeting in [facility] is every Thursda and I have asked her to see if she can go to those meetings a few times until her next appointment with me.     Return to clinic in 4 weeks or sooner if symptoms worsen    The proposed treatment plan was discussed with the patient who was provided the opportunity to ask questions and make suggestions regarding alternative treatment. Patient verbalized understanding and expressed agreement with the plan.     Opal Garcia M.D.  01/30/20    This note was created using voice recognition software (Dragon). The accuracy of the dictation is limited by the abilities of the software. I have reviewed the note prior to signing, however some errors in grammar and context are still possible. If you have any questions related to this note please do not hesitate to contact our office.

## 2020-02-20 ENCOUNTER — OFFICE VISIT (OUTPATIENT)
Dept: BEHAVIORAL HEALTH | Facility: CLINIC | Age: 60
End: 2020-02-20
Payer: COMMERCIAL

## 2020-02-20 VITALS
BODY MASS INDEX: 25.18 KG/M2 | SYSTOLIC BLOOD PRESSURE: 124 MMHG | HEIGHT: 69 IN | WEIGHT: 170 LBS | HEART RATE: 89 BPM | DIASTOLIC BLOOD PRESSURE: 79 MMHG

## 2020-02-20 DIAGNOSIS — F33.1 MDD (MAJOR DEPRESSIVE DISORDER), RECURRENT EPISODE, MODERATE (HCC): ICD-10-CM

## 2020-02-20 DIAGNOSIS — F41.1 GAD (GENERALIZED ANXIETY DISORDER): ICD-10-CM

## 2020-02-20 DIAGNOSIS — F50.81 BINGE-EATING DISORDER, MODERATE: ICD-10-CM

## 2020-02-20 PROCEDURE — 99214 OFFICE O/P EST MOD 30 MIN: CPT | Performed by: PSYCHIATRY & NEUROLOGY

## 2020-02-20 RX ORDER — LISDEXAMFETAMINE DIMESYLATE CAPSULES 60 MG/1
60 CAPSULE ORAL EVERY MORNING
Qty: 30 CAP | Refills: 0 | Status: SHIPPED | OUTPATIENT
Start: 2020-02-21 | End: 2020-03-22

## 2020-02-20 RX ORDER — TRAZODONE HYDROCHLORIDE 50 MG/1
25-50 TABLET ORAL NIGHTLY PRN
Qty: 90 TAB | Refills: 0 | Status: SHIPPED | OUTPATIENT
Start: 2020-02-20 | End: 2020-03-26 | Stop reason: SDUPTHER

## 2020-02-20 RX ORDER — CLONAZEPAM 0.5 MG/1
0.5 TABLET ORAL 2 TIMES DAILY PRN
Qty: 60 TAB | Refills: 2 | Status: SHIPPED | OUTPATIENT
Start: 2020-01-23 | End: 2020-02-22

## 2020-02-20 RX ORDER — LISDEXAMFETAMINE DIMESYLATE CAPSULES 60 MG/1
60 CAPSULE ORAL EVERY MORNING
Qty: 30 CAP | Refills: 0 | Status: SHIPPED | OUTPATIENT
Start: 2020-03-21 | End: 2020-04-20

## 2020-02-20 NOTE — PROGRESS NOTES
PSYCHIATRY FOLLOW-UP NOTE      Chief Complaint   Patient presents with   • Follow-Up     depression, anxiety         History Of Present Illness:  Sammi Alberto is a 59 y.o. old female with major depressive disorder, generalized anxiety disorder, binge eating disorder,  hypothyroidism, s/p lap band surgery in 2010, parathyroidectomy, left ACL and meniscal repair comes in today for follow up, was last seen 3 weeks ago.  She has lately been feeling better in regards to her depression and anxiety.  She has been a lot more physically active and is playing racTargazyme on a regular basis.  She is also taking walks on a daily basis.  However, she has noticed that even though she is more active and is eating better she is having a hard time losing any weight and she attributes this to being on Prozac.  She has been doing good in regards to her binge eating disorder and is no longer binging on candies.  She had ice cream one time since her last appointment and is avoiding to eat in the bed completely.  She has also gone to a few AA meetings at [facility] and so far has like those.  She is having a hard time with her sleep and is alternating between trazodone and melatonin.  She still using Klonopin on as-needed basis and infrequently uses Klonopin to help her with sleep.  She has applied for a job at [employer] in the [employer] and is hopeful that she will be starting it soon.  She still has at other job and will be traveling to [place] at the end of next month for her job.  She denies any new psychosocial stressors.  She continues to think about her ex-boyfriend but has not had any contact with him.  She tends to be really hard on herself and the decisions that she has made the 5 years she was with him.  She denies having thoughts of wanting to hurt herself or others.    Social History:   She is currently single.  She ended a 4-year long relationship in 9/2017.  She has been  and  x1 and has 2 sons from that  "marriage.  She is close with both her kids and her siblings - 3 sisters and a brother.  She lives alone in [place].  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.    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16 after successfully completing an inpatient rehab in [place]  Nicotine - Denies  Illicit drugs - Denies    Past Medication Trials:  Prozac 40 mg (s/e - weight gain), Nortriptyline, Effexor  mg (initally effective), Zoloft 200 mg (ineffective), Lexapro 20 mg (stopped after she had acute drop in platelet count while she was on Bactrim), Abilify 10 mg (s/e - \"made me mean\"), Vyvanse 60 mg (partially effective for binge eating disorder)    Medications:  Current Outpatient Medications   Medication Sig Dispense Refill   • clonazePAM (KLONOPIN) 0.5 MG Tab Take 0.5 mg by mouth 2 times a day as needed.     • buPROPion (WELLBUTRIN XL) 150 MG XL tablet Take 3 Tabs by mouth every morning. 270 Tab 0   • fluoxetine (PROZAC) 40 MG capsule Take 1 Cap by mouth every day. 90 Cap 0   • traZODone (DESYREL) 50 MG Tab Take 0.5-1 Tabs by mouth at bedtime as needed for Sleep. 90 Tab 0   • zolmitriptan (ZOMIG) 5 MG tablet Take 5 mg by mouth.     • valacyclovir (VALTREX) 1 GM Tab Take 1,000 mg by mouth 2 times a day.     • levothyroxine (SYNTHROID) 112 MCG Tab Take 112 mcg by mouth Every morning on an empty stomach.     • therapeutic multivitamin-minerals (THERAGRAN-M) Tab Take 1 Tab by mouth every day.       No current facility-administered medications for this visit.        Review Of Systems:    Constitutional - Positive for fatigue  Respiratory - Negative for shortness of breath, cough  CVS - Negative for chest pain, palpitations  GI - Negative for nausea, vomiting, abdominal pain, diarrhea, constipation  Musculoskeletal - Negative for back pain  Neurological - Positive for headaches " "  Psychiatric - Positive for depression, anxiety, poor sleep    Physical Examination:  Vital signs: /79   Pulse 89   Ht 1.753 m (5' 9\")   Wt 77.1 kg (170 lb)   BMI 25.10 kg/m²     Musculoskeletal: Normal gait. No abnormal movements.     Mental Status Evaluation:   General: Middle aged white female, dressed in casual attire, good grooming and hygiene, in no apparent distress, calm and cooperative, intermittent eye contact, psychomotor retardation  Orientation: Alert and oriented to person, place and time  Recent and remote memory: Grossly intact  Attention span and concentration: Grossly intact  Speech: Spontaneous, normal rate, rhythm and tone  Thought Process: Linear, logical and goal directed  Thought Content: Denies passive active suicidal or homicidal ideations, intent or plan  Perception: Denies auditory or visual hallucinations. No delusions noted  Associations: Intact  Language: Appropriate  Fund of knowledge and vocabulary: Grossly adequate  Mood: \"okay\"  Affect: Euthymic, mood congruent  Insight: Good  Judgment: Good    Depression screening:  Depression Screen (PHQ-2/PHQ-9) 4/4/2018 12/11/2018 7/3/2019   PHQ-2 Total Score - - -   PHQ-2 Total Score 6 6 4   PHQ-9 Total Score - - -   PHQ-9 Total Score 12 16 13     Interpretation of PHQ-9 Total Score   Score Severity   1-4 No Depression   5-9 Mild Depression   10-14 Moderate Depression   15-19 Moderately Severe Depression   20-27 Severe Depression    Medical Records/Labs/Diagnostic Tests Reviewed:  [facility] records - appropriate refills, no abuse suspected       Impression:  1.  Major depressive disorder, recurrent, moderate - improving   2.  Binge eating disorder, moderate - improving  3.  Generalized anxiety disorder - stable     Plan:  1.  Taper down Prozac to 40 mg every other morning for 2 weeks and then discontinue  2.  Continue Wellbutrin  mg in the morning for depression and anxiety  3.  Continue Vyvanse 60 mg in the morning for binge " eating disorder.  Provided her with 2 prescriptions for 30 tabs each.  3.  Continue Klonopin 0.5 mg 2 times daily as needed for anxiety.  Provided her with 1 prescription for 60 tabs with 2 refills.  4.  Continue Trazodone 25-50 mg at bedtime as needed for sleep.  She is also using over-the-counter melatonin 10 mg on as-needed basis to help with insomnia when she does not use the trazodone but notices some daytime sedation.  I have advised her to try to use 5 mg instead.  5.  Continue to maintain sobriety from alcohol and to continue going to weekly AA meetings     Return to clinic in 4 weeks or sooner if symptoms worsen    The proposed treatment plan was discussed with the patient who was provided the opportunity to ask questions and make suggestions regarding alternative treatment. Patient verbalized understanding and expressed agreement with the plan.     Opal Garcia M.D.  02/20/20    This note was created using voice recognition software (Dragon). The accuracy of the dictation is limited by the abilities of the software. I have reviewed the note prior to signing, however some errors in grammar and context are still possible. If you have any questions related to this note please do not hesitate to contact our office.

## 2020-03-03 ENCOUNTER — EH NON-PROVIDER (OUTPATIENT)
Dept: OCCUPATIONAL MEDICINE | Facility: CLINIC | Age: 60
End: 2020-03-03

## 2020-03-03 ENCOUNTER — EMPLOYEE HEALTH (OUTPATIENT)
Dept: OCCUPATIONAL MEDICINE | Facility: CLINIC | Age: 60
End: 2020-03-03

## 2020-03-03 ENCOUNTER — NON-PROVIDER VISIT (OUTPATIENT)
Dept: OCCUPATIONAL MEDICINE | Facility: CLINIC | Age: 60
End: 2020-03-03

## 2020-03-03 ENCOUNTER — HOSPITAL ENCOUNTER (OUTPATIENT)
Facility: MEDICAL CENTER | Age: 60
End: 2020-03-03
Attending: NURSE PRACTITIONER
Payer: COMMERCIAL

## 2020-03-03 DIAGNOSIS — Z02.1 PRE-EMPLOYMENT HEALTH SCREENING EXAMINATION: ICD-10-CM

## 2020-03-03 DIAGNOSIS — Z02.1 PRE-EMPLOYMENT DRUG SCREENING: Primary | ICD-10-CM

## 2020-03-03 DIAGNOSIS — Z02.83 ENCOUNTER FOR DRUG SCREENING: ICD-10-CM

## 2020-03-03 DIAGNOSIS — Z02.1 PRE-EMPLOYMENT DRUG SCREENING: ICD-10-CM

## 2020-03-03 LAB
AMP AMPHETAMINE: NORMAL
BAR BARBITURATES: NORMAL
BZO BENZODIAZEPINES: NORMAL
COC COCAINE: NORMAL
INT CON NEG: NORMAL
INT CON POS: NORMAL
MDMA ECSTASY: NORMAL
MET METHAMPHETAMINES: NORMAL
MTD METHADONE: NORMAL
OPI OPIATES: NORMAL
OXY OXYCODONE: NORMAL
PCP PHENCYCLIDINE: NORMAL
POC URINE DRUG SCREEN OCDRS: NORMAL
THC: NORMAL

## 2020-03-03 PROCEDURE — 86787 VARICELLA-ZOSTER ANTIBODY: CPT | Performed by: NURSE PRACTITIONER

## 2020-03-03 PROCEDURE — 8915 PR COMPREHENSIVE PHYSICAL: Performed by: PREVENTIVE MEDICINE

## 2020-03-03 PROCEDURE — 90715 TDAP VACCINE 7 YRS/> IM: CPT | Performed by: NURSE PRACTITIONER

## 2020-03-03 PROCEDURE — 86762 RUBELLA ANTIBODY: CPT | Performed by: NURSE PRACTITIONER

## 2020-03-03 PROCEDURE — 86765 RUBEOLA ANTIBODY: CPT | Performed by: NURSE PRACTITIONER

## 2020-03-03 PROCEDURE — 86735 MUMPS ANTIBODY: CPT | Performed by: NURSE PRACTITIONER

## 2020-03-03 PROCEDURE — 90746 HEPB VACCINE 3 DOSE ADULT IM: CPT | Performed by: NURSE PRACTITIONER

## 2020-03-03 PROCEDURE — 80305 DRUG TEST PRSMV DIR OPT OBS: CPT | Performed by: NURSE PRACTITIONER

## 2020-03-03 PROCEDURE — 86480 TB TEST CELL IMMUN MEASURE: CPT | Performed by: NURSE PRACTITIONER

## 2020-03-03 PROCEDURE — 8911 PR MRO FEE: Performed by: NURSE PRACTITIONER

## 2020-03-03 PROCEDURE — 94375 RESPIRATORY FLOW VOLUME LOOP: CPT | Performed by: NURSE PRACTITIONER

## 2020-03-03 RX ORDER — CLONAZEPAM 1 MG/1
0.5 TABLET ORAL 2 TIMES DAILY
COMMUNITY
End: 2020-03-26

## 2020-03-03 ASSESSMENT — VISUAL ACUITY
OS_CC: 20/70
OD_CC: 20/40

## 2020-03-04 LAB
MEV IGG SER IA-ACNC: 3.12
MUV IGG SER IA-ACNC: 0.12
RUBV AB SER QL: 70.8 IU/ML
VZV IGG SER IA-ACNC: 2.28

## 2020-03-06 LAB
GAMMA INTERFERON BACKGROUND BLD IA-ACNC: 0.02 IU/ML
M TB IFN-G BLD-IMP: NEGATIVE
M TB IFN-G CD4+ BCKGRND COR BLD-ACNC: 0.02 IU/ML
MITOGEN IGNF BCKGRD COR BLD-ACNC: >10 IU/ML
QFT TB2 - NIL TBQ2: 0.01 IU/ML

## 2020-03-10 ENCOUNTER — TELEPHONE (OUTPATIENT)
Dept: URGENT CARE | Facility: CLINIC | Age: 60
End: 2020-03-10

## 2020-03-26 ENCOUNTER — OFFICE VISIT (OUTPATIENT)
Dept: BEHAVIORAL HEALTH | Facility: CLINIC | Age: 60
End: 2020-03-26
Payer: COMMERCIAL

## 2020-03-26 DIAGNOSIS — F41.1 GAD (GENERALIZED ANXIETY DISORDER): ICD-10-CM

## 2020-03-26 DIAGNOSIS — F50.81 BINGE-EATING DISORDER, MODERATE: ICD-10-CM

## 2020-03-26 DIAGNOSIS — F51.04 CHRONIC INSOMNIA: ICD-10-CM

## 2020-03-26 DIAGNOSIS — F33.1 MDD (MAJOR DEPRESSIVE DISORDER), RECURRENT EPISODE, MODERATE (HCC): ICD-10-CM

## 2020-03-26 PROCEDURE — 99443 PR PHYSICIAN TELEPHONE EVALUATION 21-30 MIN: CPT | Mod: CR | Performed by: PSYCHIATRY & NEUROLOGY

## 2020-03-26 RX ORDER — BUPROPION HYDROCHLORIDE 150 MG/1
450 TABLET ORAL EVERY MORNING
Qty: 270 TAB | Refills: 0 | Status: SHIPPED | OUTPATIENT
Start: 2020-03-26 | End: 2020-06-25 | Stop reason: SDUPTHER

## 2020-03-26 RX ORDER — FLUOXETINE HYDROCHLORIDE 40 MG/1
40 CAPSULE ORAL DAILY
Qty: 90 CAP | Refills: 0 | Status: SHIPPED | OUTPATIENT
Start: 2020-03-26 | End: 2020-06-25 | Stop reason: SDUPTHER

## 2020-03-26 RX ORDER — TRAZODONE HYDROCHLORIDE 50 MG/1
25-50 TABLET ORAL NIGHTLY PRN
Qty: 90 TAB | Refills: 0 | Status: SHIPPED | OUTPATIENT
Start: 2020-03-26 | End: 2020-06-25 | Stop reason: SDUPTHER

## 2020-03-26 RX ORDER — CLONAZEPAM 0.5 MG/1
0.5 TABLET ORAL 2 TIMES DAILY PRN
COMMUNITY
Start: 2020-03-19 | End: 2020-05-20

## 2020-03-26 NOTE — PROGRESS NOTES
Telephone Appointment Visit   As a means of avoiding spread of COVID-19, this visit is being conducted by telephone. This telephone visit was initiated by the patient and they verbally consented.    Time at start of call: 2:31 PM    Reason for Call:  Medication Follow-up and Symptom Follow-up    Patient Comments / History:   She has been doing all right in regards to her depression and anxiety.  She did try to taper herself off Prozac but when she went down to 20 mg she started struggling more with her depression and lack of motivation and went back on her previous dose of 40 mg.  She feels overall okay in regards to her mood and has not had any contact with her ex-boyfriend.  However, she still continues to think about him on a regular basis.  She got a new job at [employer] in the [employer] and is looking forward to doing it.  She is still working remotely for another job but her trip to [place] was canceled because of coronavirus.  She is still trying to take her dog out for walks on a daily basis.  She is doing all right in regards to her appetite and sleep.  She is not using more than the prescribed Klonopin for her anxiety symptoms.  She denies any new psychosocial stressors.  She has been in touch with 1 of her ex-boyfriend who lives in [place] and is thinking about meeting him but does not want to get back with him at least at this time.  She does not want to be in a relationship because of how her last relationship ended.  She denies having thoughts of wanting to hurt herself or others.  She denies alcohol, nicotine or illicit drug use    Labs / Images Reviewed   [place] but   reviewed -appropriate refills, no abuse suspected thanks    Assessment and Plan:     1. MDD (major depressive disorder), recurrent episode, moderate (HCC)  -Continue Prozac 40 mg and Wellbutrin  mg in the morning for depression    2. EDVNI (generalized anxiety disorder)  - Continue Prozac 40 mg and Wellbutrin  mg in the morning  for anxiety  - Continue Klonopin 0.5 mg twice daily as needed for anxiety    3. Binge-eating disorder, moderate  - Continue Vyvanse 60 mg in the morning    4. Chronic insomnia  - Continue Trazodone 25 to 50 mg at bedtime for sleep    Follow-up: Return in about 4 weeks (around 4/23/2020) for 30 minutes.    Time at end of call: 2:55 PM  Total Time Spent: 21-30 minutes    Opal Garcia M.D.

## 2020-04-14 ENCOUNTER — EH NON-PROVIDER (OUTPATIENT)
Dept: OCCUPATIONAL MEDICINE | Facility: CLINIC | Age: 60
End: 2020-04-14

## 2020-04-14 DIAGNOSIS — Z23 NEED FOR VACCINATION: ICD-10-CM

## 2020-04-14 PROCEDURE — 90707 MMR VACCINE SC: CPT | Performed by: FAMILY MEDICINE

## 2020-04-14 PROCEDURE — 90746 HEPB VACCINE 3 DOSE ADULT IM: CPT | Performed by: FAMILY MEDICINE

## 2020-04-29 ENCOUNTER — TELEMEDICINE (OUTPATIENT)
Dept: BEHAVIORAL HEALTH | Facility: CLINIC | Age: 60
End: 2020-04-29
Payer: COMMERCIAL

## 2020-04-29 VITALS — HEIGHT: 69 IN | BODY MASS INDEX: 25.1 KG/M2

## 2020-04-29 DIAGNOSIS — F41.1 GENERALIZED ANXIETY DISORDER: ICD-10-CM

## 2020-04-29 DIAGNOSIS — F33.1 MDD (MAJOR DEPRESSIVE DISORDER), RECURRENT EPISODE, MODERATE (HCC): ICD-10-CM

## 2020-04-29 DIAGNOSIS — F51.04 CHRONIC INSOMNIA: ICD-10-CM

## 2020-04-29 DIAGNOSIS — F50.81 BINGE-EATING DISORDER, MODERATE: ICD-10-CM

## 2020-04-29 PROCEDURE — 99214 OFFICE O/P EST MOD 30 MIN: CPT | Mod: 95,CR | Performed by: PSYCHIATRY & NEUROLOGY

## 2020-04-29 RX ORDER — LISDEXAMFETAMINE DIMESYLATE CAPSULES 60 MG/1
60 CAPSULE ORAL EVERY MORNING
Qty: 30 CAP | Refills: 0 | Status: SHIPPED
Start: 2020-04-29 | End: 2020-05-20

## 2020-04-29 RX ORDER — ZOLMITRIPTAN 5 MG/1
TABLET, FILM COATED ORAL
COMMUNITY
Start: 2020-04-02 | End: 2020-08-05 | Stop reason: SDUPTHER

## 2020-04-29 NOTE — PROGRESS NOTES
PSYCHIATRY TELEMEDICINE FOLLOW-UP NOTE      Chief Complaint   Patient presents with   • Follow-Up     depression, anxiety, eating disorder     This encounter was conducted via Zoom .   Verbal consent was obtained. Patient's identity was verified.    History Of Present Illness:  Sammi Alberto is a 60 y.o. old female with major depressive disorder, generalized anxiety disorder, binge eating disorder,  hypothyroidism, s/p lap band surgery in 2010, parathyroidectomy, left ACL and meniscal repair comes in today for follow up, was last seen 4 weeks ago.  She has been doing all right in regards to her depression and anxiety.  She is having a hard time because of coronavirus but is trying to stay positive.  Her youngest son lost his job and moved in with her yesterday and will be staying with her for a while.  She is happy that he is home as that gives her a reason to take care of herself.  She has been compliant with all of her medications.  She is struggling with her eating even though now she is cooking at home.  She is thinking about going back to the clinic in [facility] where she was getting B complex injections as well as phentermine.  She feels that phentermine was better for her eating disorder compared to Vyvanse.  She has not contacted her ex-boyfriend recently but still has a hard time getting over that relationship.  She denies any other psychosocial stressors.    Social History:   She is currently single.  She ended a 4-year long relationship in 9/2017.  She has been  and  x1 and has 2 sons from that marriage.  She is close with both her kids and her siblings - 3 sisters and a brother.  She lives alone in [place].  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  She also works part-time at [employer] in [place] in the [employer].    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16 after successfully completing an inpatient rehab  "in [place]  Nicotine - Denies  Illicit drugs - Denies    Past Medication Trials:  Prozac 40 mg (s/e - weight gain), Nortriptyline, Effexor  mg (initally effective), Zoloft 200 mg (ineffective), Lexapro 20 mg (stopped after she had acute drop in platelet count while she was on Bactrim), Abilify 10 mg (s/e - \"made me mean\"), Vyvanse 60 mg (partially effective for binge eating disorder)    Medications:  Current Outpatient Medications   Medication Sig Dispense Refill   • zolmitriptan (ZOMIG) 5 MG tablet TAKE ONE TABLET BY MOUTH ONCE AS NEEDED FOR MIGRAINE, MAY REPEAT IN 2 HOURS. MAX DOSE IS 2 TABLETS IN 24 HOURS     • clonazePAM (KLONOPIN) 0.5 MG Tab Take 0.5 mg by mouth 2 times a day as needed.     • fluoxetine (PROZAC) 40 MG capsule Take 1 Cap by mouth every day. 90 Cap 0   • buPROPion (WELLBUTRIN XL) 150 MG XL tablet Take 3 Tabs by mouth every morning. 270 Tab 0   • traZODone (DESYREL) 50 MG Tab Take 0.5-1 Tabs by mouth at bedtime as needed for Sleep. 90 Tab 0   • valacyclovir (VALTREX) 1 GM Tab Take 1,000 mg by mouth 2 times a day.     • therapeutic multivitamin-minerals (THERAGRAN-M) Tab Take 1 Tab by mouth every day.     • levothyroxine (SYNTHROID) 112 MCG Tab Take 112 mcg by mouth Every morning on an empty stomach.       No current facility-administered medications for this visit.        Review Of Systems:    Constitutional - Positive for fatigue  Respiratory - Negative for shortness of breath, cough  CVS - Negative for chest pain, palpitations  GI - Negative for nausea, vomiting, abdominal pain, diarrhea, constipation  Musculoskeletal - Negative for back pain  Neurological - Positive for headaches   Psychiatric - Positive for depression, anxiety    Physical Examination:  Vital signs: Ht 1.753 m (5' 9\")   BMI 25.10 kg/m²     Musculoskeletal: No abnormal movements.     Mental Status Evaluation:   General: Middle aged white female, dressed in casual attire, good grooming and hygiene, in no apparent " "distress, calm and cooperative, intermittent eye contact, psychomotor retardation  Orientation: Alert and oriented to person, place and time  Recent and remote memory: Grossly intact  Attention span and concentration: Grossly intact  Speech: Spontaneous, normal rate, rhythm and tone  Thought Process: Linear, logical and goal directed  Thought Content: Denies passive active suicidal or homicidal ideations, intent or plan  Perception: Denies auditory or visual hallucinations. No delusions noted  Associations: Intact  Language: Appropriate  Fund of knowledge and vocabulary: Grossly adequate  Mood: \"okay\"  Affect: Euthymic, mood congruent  Insight: Good  Judgment: Good    Depression screening:  Depression Screen (PHQ-2/PHQ-9) 4/4/2018 12/11/2018 7/3/2019   PHQ-2 Total Score - - -   PHQ-2 Total Score 6 6 4   PHQ-9 Total Score - - -   PHQ-9 Total Score 12 16 13     Interpretation of PHQ-9 Total Score   Score Severity   1-4 No Depression   5-9 Mild Depression   10-14 Moderate Depression   15-19 Moderately Severe Depression   20-27 Severe Depression    Medical Records/Labs/Diagnostic Tests Reviewed:  [facility] records - appropriate refills, no abuse suspected       Impression:  1.  Major depressive disorder, recurrent, moderate - stable  2.  Binge eating disorder, moderate - stable  3.  Generalized anxiety disorder - stable   4.  Chronic insomnia - stable    Plan:  1.  Continue Prozac 40 mg and Wellbutrin  mg daily for depression and anxiety   3.  Continue Vyvanse 60 mg in the morning for binge eating disorder.  E-prescribed x 1 month.  3.  Continue Klonopin 0.5 mg 2 times daily as needed for anxiety.  Not refilled today  4.  Continue Trazodone 25-50 mg at bedtime as needed for sleep  5.  Continue to maintain sobriety from alcohol and to continue attending online AA meetings     Return to clinic in 4 weeks or sooner if symptoms worsen    The proposed treatment plan was discussed with the patient who was provided the " opportunity to ask questions and make suggestions regarding alternative treatment. Patient verbalized understanding and expressed agreement with the plan.     Opal Garcia M.D.  04/29/20    This note was created using voice recognition software (Dragon). The accuracy of the dictation is limited by the abilities of the software. I have reviewed the note prior to signing, however some errors in grammar and context are still possible. If you have any questions related to this note please do not hesitate to contact our office.

## 2020-05-20 ENCOUNTER — TELEMEDICINE (OUTPATIENT)
Dept: BEHAVIORAL HEALTH | Facility: CLINIC | Age: 60
End: 2020-05-20
Payer: COMMERCIAL

## 2020-05-20 VITALS — HEIGHT: 69 IN | WEIGHT: 170 LBS | BODY MASS INDEX: 25.18 KG/M2

## 2020-05-20 DIAGNOSIS — F41.1 GENERALIZED ANXIETY DISORDER: ICD-10-CM

## 2020-05-20 DIAGNOSIS — F51.04 CHRONIC INSOMNIA: ICD-10-CM

## 2020-05-20 DIAGNOSIS — F50.81 BINGE-EATING DISORDER, MODERATE: ICD-10-CM

## 2020-05-20 DIAGNOSIS — F33.1 MDD (MAJOR DEPRESSIVE DISORDER), RECURRENT EPISODE, MODERATE (HCC): ICD-10-CM

## 2020-05-20 PROCEDURE — 99443 PR PHYSICIAN TELEPHONE EVALUATION 21-30 MIN: CPT | Mod: CR | Performed by: PSYCHIATRY & NEUROLOGY

## 2020-05-20 RX ORDER — CLONAZEPAM 0.5 MG/1
0.5 TABLET ORAL 2 TIMES DAILY
Qty: 60 TAB | Refills: 2 | Status: SHIPPED | OUTPATIENT
Start: 2020-05-20 | End: 2020-06-19

## 2020-05-20 RX ORDER — PHENTERMINE HYDROCHLORIDE 30 MG/1
30 CAPSULE ORAL EVERY MORNING
COMMUNITY
End: 2020-06-25

## 2020-05-20 NOTE — PROGRESS NOTES
Telephone Appointment Visit   As a means of avoiding spread of COVID-19, this visit is being conducted by telephone. This telephone visit was initiated by the patient and they verbally consented.    Time at start of call: 3:00 PM    Reason for Call:  Medication Follow-up and Symptom Follow-up    Patient Comments / History:   She appeared to be having a hard day today as she was laid off from her full-time job of 13 years.  She expressed sadness and other emotions related to losing this job.  She feels that financially she will be okay and has 2 part-time jobs and is grateful for that.  She has an option of working more at her job at [employer] which she wants to think about it.  She also has her son who is living with her which has been really good for her mental health.  He got a full-time job and will be staying with her for a while.  She is also back on phentermine and stopped taking Vyvanse for her binge eating disorder.  She endorses more benefit from phentermine and would like to continue phentermine for now.  She has been taking some yoga classes to keep herself active.  She continues to use Klonopin for both anxiety and sleep as well as trazodone.  She denies any contact with her ex-boyfriend.    Labs / Images Reviewed   [place]  - appropriate refills, no abuse suspected     Assessment and Plan:     1. MDD (major depressive disorder), recurrent episode, moderate ([facility])  2. EDVIN (generalized anxiety disorder)  3. Binge-eating disorder, moderate  4. Chronic insomnia    -Continue Wellbutrin  mg and Prozac 40 mg daily for depression and anxiety  -Discontinue Vyvanse for binge eating disorder  -Continue Klonopin 0.5 mg twice daily as needed for anxiety.  Advised her to use Klonopin for her anxiety symptoms and to use Trazodone for sleep instead.  -Continue Trazodone 25 to 50 mg at bedtime as needed for sleep  -Provided supportive psychotherapy in regards to loss of her job    Follow-up: Return in about 4 weeks  (around 6/17/2020) for 30 minutes.    Time at end of call: 3:28 PM  Total Time Spent: 21-30 minutes    Opal Garcia M.D.

## 2020-05-27 ENCOUNTER — EH NON-PROVIDER (OUTPATIENT)
Dept: OCCUPATIONAL MEDICINE | Facility: CLINIC | Age: 60
End: 2020-05-27

## 2020-05-27 DIAGNOSIS — Z23 NEED FOR VACCINATION: Primary | ICD-10-CM

## 2020-05-27 PROCEDURE — 90707 MMR VACCINE SC: CPT | Performed by: NURSE PRACTITIONER

## 2020-05-29 ENCOUNTER — EH NON-PROVIDER (OUTPATIENT)
Dept: OCCUPATIONAL MEDICINE | Facility: CLINIC | Age: 60
End: 2020-05-29

## 2020-05-29 DIAGNOSIS — Z71.85 IMMUNIZATION COUNSELING: ICD-10-CM

## 2020-06-25 ENCOUNTER — TELEMEDICINE (OUTPATIENT)
Dept: BEHAVIORAL HEALTH | Facility: CLINIC | Age: 60
End: 2020-06-25
Payer: COMMERCIAL

## 2020-06-25 VITALS — BODY MASS INDEX: 26.37 KG/M2 | HEIGHT: 67 IN | WEIGHT: 168 LBS

## 2020-06-25 DIAGNOSIS — F33.1 MDD (MAJOR DEPRESSIVE DISORDER), RECURRENT EPISODE, MODERATE (HCC): ICD-10-CM

## 2020-06-25 DIAGNOSIS — F51.04 CHRONIC INSOMNIA: ICD-10-CM

## 2020-06-25 DIAGNOSIS — F41.1 GENERALIZED ANXIETY DISORDER: ICD-10-CM

## 2020-06-25 DIAGNOSIS — F50.81 BINGE-EATING DISORDER, MODERATE: ICD-10-CM

## 2020-06-25 PROCEDURE — 90833 PSYTX W PT W E/M 30 MIN: CPT | Mod: 95,CR | Performed by: PSYCHIATRY & NEUROLOGY

## 2020-06-25 PROCEDURE — 99214 OFFICE O/P EST MOD 30 MIN: CPT | Mod: 95,CR | Performed by: PSYCHIATRY & NEUROLOGY

## 2020-06-25 RX ORDER — TRAZODONE HYDROCHLORIDE 50 MG/1
25-50 TABLET ORAL NIGHTLY PRN
Qty: 90 TAB | Refills: 0 | Status: SHIPPED | OUTPATIENT
Start: 2020-06-25 | End: 2020-09-15 | Stop reason: SDUPTHER

## 2020-06-25 RX ORDER — PHENTERMINE HYDROCHLORIDE 37.5 MG/1
37.5 CAPSULE ORAL EVERY MORNING
COMMUNITY
End: 2020-08-11

## 2020-06-25 RX ORDER — BUPROPION HYDROCHLORIDE 150 MG/1
450 TABLET ORAL EVERY MORNING
Qty: 270 TAB | Refills: 0 | Status: SHIPPED | OUTPATIENT
Start: 2020-06-25 | End: 2020-09-15 | Stop reason: SDUPTHER

## 2020-06-25 RX ORDER — FLUOXETINE HYDROCHLORIDE 40 MG/1
40 CAPSULE ORAL DAILY
Qty: 90 CAP | Refills: 0 | Status: SHIPPED | OUTPATIENT
Start: 2020-06-25 | End: 2020-09-15 | Stop reason: SDUPTHER

## 2020-06-25 RX ORDER — CYANOCOBALAMIN 1000 UG/ML
1000 INJECTION, SOLUTION INTRAMUSCULAR; SUBCUTANEOUS
COMMUNITY
End: 2020-08-11

## 2020-07-15 ENCOUNTER — APPOINTMENT (OUTPATIENT)
Dept: BEHAVIORAL HEALTH | Facility: CLINIC | Age: 60
End: 2020-07-15
Payer: COMMERCIAL

## 2020-08-04 ENCOUNTER — TELEPHONE (OUTPATIENT)
Dept: SCHEDULING | Facility: IMAGING CENTER | Age: 60
End: 2020-08-04

## 2020-08-05 ENCOUNTER — TELEMEDICINE (OUTPATIENT)
Dept: MEDICAL GROUP | Facility: MEDICAL CENTER | Age: 60
End: 2020-08-05
Payer: COMMERCIAL

## 2020-08-05 DIAGNOSIS — Z12.39 BREAST CANCER SCREENING: ICD-10-CM

## 2020-08-05 DIAGNOSIS — Z12.11 COLON CANCER SCREENING: ICD-10-CM

## 2020-08-05 DIAGNOSIS — B18.2 HEP C W/O COMA, CHRONIC (HCC): ICD-10-CM

## 2020-08-05 DIAGNOSIS — Z78.0 ASYMPTOMATIC MENOPAUSAL STATE: ICD-10-CM

## 2020-08-05 DIAGNOSIS — E03.9 HYPOTHYROIDISM, UNSPECIFIED TYPE: ICD-10-CM

## 2020-08-05 DIAGNOSIS — Z00.00 HEALTHCARE MAINTENANCE: ICD-10-CM

## 2020-08-05 DIAGNOSIS — F41.1 GENERALIZED ANXIETY DISORDER: ICD-10-CM

## 2020-08-05 DIAGNOSIS — F50.81 BINGE EATING DISORDER: ICD-10-CM

## 2020-08-05 DIAGNOSIS — G43.009 MIGRAINE WITHOUT AURA AND WITHOUT STATUS MIGRAINOSUS, NOT INTRACTABLE: ICD-10-CM

## 2020-08-05 DIAGNOSIS — S83.207D ACUTE MENISCAL TEAR OF LEFT KNEE, SUBSEQUENT ENCOUNTER: ICD-10-CM

## 2020-08-05 DIAGNOSIS — F51.04 CHRONIC INSOMNIA: ICD-10-CM

## 2020-08-05 DIAGNOSIS — F33.1 MDD (MAJOR DEPRESSIVE DISORDER), RECURRENT EPISODE, MODERATE (HCC): ICD-10-CM

## 2020-08-05 PROCEDURE — 99204 OFFICE O/P NEW MOD 45 MIN: CPT | Performed by: FAMILY MEDICINE

## 2020-08-05 RX ORDER — LEVOTHYROXINE SODIUM 112 UG/1
112 TABLET ORAL
Qty: 30 TAB | Status: CANCELLED | OUTPATIENT
Start: 2020-08-05

## 2020-08-05 RX ORDER — ZOLMITRIPTAN 5 MG/1
TABLET, FILM COATED ORAL
Qty: 10 TAB | Refills: 3 | Status: SHIPPED | OUTPATIENT
Start: 2020-08-05 | End: 2021-08-31 | Stop reason: SDUPTHER

## 2020-08-05 RX ORDER — LEVOTHYROXINE SODIUM 112 UG/1
112 TABLET ORAL
Qty: 90 TAB | Refills: 3 | Status: SHIPPED | OUTPATIENT
Start: 2020-08-05 | End: 2021-08-20 | Stop reason: SDUPTHER

## 2020-08-05 RX ORDER — ZOLMITRIPTAN 5 MG/1
TABLET, FILM COATED ORAL
Qty: 10 TAB | Status: CANCELLED | OUTPATIENT
Start: 2020-08-05

## 2020-08-05 NOTE — PROGRESS NOTES
This encounter was conducted via Zoom meeting  Verbal consent was obtained. Patient's identity was verified.       CC: New patient: Depression, anxiety, chronic insomnia, hypothyroidism, migraine headache, binge eating disorder, hep C, left knee pain                                                                                                                                      HPI:   Sammi presents today with the following.    MDD (major depressive disorder), recurrent episode, moderate (HCC)/anxiety/ Chronic insomnia  Generally however mood stable, but sometimes fluctuating.  He is currently on Prozac and Wellbutrin to help with the mood, and on Klonopin to help with anxiety,and the Trazodone has been helping with sleeping.  Patient, has been following up with psychiatrist in a regular basis.      Hypothyroidism, unspecified type  He has been tolerating Levothyroxine, no palpitation, no cold or heat intolerance, has been on levothyroxine 112 mcg daily.    Acute meniscal tear of left knee, subsequent encounter  Patient is a , and history of ACL reconstruction, and meniscal tear.  She has been following up with orthopedist.    Binge eating disorder  Phentermine has been helping helps.  Continue follow-up with psychiatry    Migraine without aura and without status migrainosus, not intractable  Zolmitriptan has helped her hot acute episodes.  However patient has been having the episodes in clusters.  Patient needs medication refill.    Hep C w/o coma, chronic (HCC)  Was treated before for hep C, she has been checking her viral load annually.      Declined the colonoscopy for now, she is fine to have a Cologuard.  Due for mammogram and bone density, order placed.        Patient Active Problem List    Diagnosis Date Noted   • Chronic insomnia 03/26/2020   • EDVIN (generalized anxiety disorder) 08/11/2017   • Binge-eating disorder, moderate 04/07/2017   • MDD (major depressive disorder), recurrent episode,  moderate (HCC) 02/03/2017   • Hypothyroidism 11/01/2016   • Acute meniscal tear of left knee 10/15/2016   • Rupture of anterior cruciate ligament of left knee 10/15/2016       Current Outpatient Medications   Medication Sig Dispense Refill   • levothyroxine (SYNTHROID) 112 MCG Tab Take 1 Tab by mouth Every morning on an empty stomach. 90 Tab 3   • zolmitriptan (ZOMIG) 5 MG tablet TAKE ONE TABLET BY MOUTH ONCE AS NEEDED FOR MIGRAINE, MAY REPEAT IN 2 HOURS. MAX DOSE IS 2 TABLETS IN 24 HOURS 10 Tab 3   • cyanocobalamin (VITAMIN B-12) 1000 MCG/ML Solution 1,000 mcg by Intramuscular route every 30 days.     • phentermine 37.5 MG capsule Take 37.5 mg by mouth every morning.     • buPROPion (WELLBUTRIN XL) 150 MG XL tablet Take 3 Tabs by mouth every morning. 270 Tab 0   • fluoxetine (PROZAC) 40 MG capsule Take 1 Cap by mouth every day. 90 Cap 0   • traZODone (DESYREL) 50 MG Tab Take 0.5-1 Tabs by mouth at bedtime as needed for Sleep. 90 Tab 0   • valacyclovir (VALTREX) 1 GM Tab Take 1,000 mg by mouth 2 times a day.     • therapeutic multivitamin-minerals (THERAGRAN-M) Tab Take 1 Tab by mouth every day.       No current facility-administered medications for this visit.          Allergies as of 08/05/2020 - Reviewed 08/05/2020   Allergen Reaction Noted   • Bactrim ds  04/29/2019   • Codeine Rash 10/18/2016   • Sulfa drugs Unspecified 08/05/2020        ROS:  Denies, chest pain, Shortness of breath, Edema.     LMP  (LMP Unknown)       Physical Exam:  Constitutional: Alert, no distress, well-groomed.  Skin: No rashes in visible areas.  Eye: Round. Conjunctiva clear, lNo icterus.   ENMT: Lips pink without lesions, good dentition, moist mucous membranes. Phonation normal.  Neck: No masses, no thyromegaly. Moves freely without pain.  CV: Pulse as reported by patient  Respiratory: Unlabored respiratory effort, no cough or audible wheeze  Psych: Alert and oriented x3, normal affect and mood.          Assessment and Plan.   60 y.o.  female with the following issues.    1. MDD (major depressive disorder), recurrent episode, moderate (HCC)  Mood has been fluctuating.  Continue on Prozac and Wellbutrin.  Continue follow-up with psychiatrist.    - CBC WITH DIFFERENTIAL; Future  - Comp Metabolic Panel; Future    2. Hypothyroidism, unspecified type  He has been tolerating Levothyroxine, no palpitation, no cold or heat intolerance  Continue on levothyroxine 112 mcg daily.    - levothyroxine (SYNTHROID) 112 MCG Tab; Take 1 Tab by mouth Every morning on an empty stomach.  Dispense: 90 Tab; Refill: 3  - Comp Metabolic Panel; Future  - Lipid Profile; Future  - TSH; Future    3. EDVIN (generalized anxiety disorder)  Clinically stable.  Continue on Prozac, has been on Klonopin as per psychiatry note.  Continue follow-up with psychiatry    4. Chronic insomnia  Trazodone has been helping.    5. Acute meniscal tear of left knee, subsequent encounter  Follow-up with orthopedist    6. Binge eating disorder  Phentermine has been helping helps.  Continue follow-up with psychiatry    7. Migraine without aura and without status migrainosus, not intractable  Zolmitriptan has helped her hot acute episodes.  However patient has been having the episodes in clusters.  Medication refilled.  Refer patient to neurology for more evaluation, probably for Botox injections.    - REFERRAL TO NEUROLOGY  - zolmitriptan (ZOMIG) 5 MG tablet; TAKE ONE TABLET BY MOUTH ONCE AS NEEDED FOR MIGRAINE, MAY REPEAT IN 2 HOURS. MAX DOSE IS 2 TABLETS IN 24 HOURS  Dispense: 10 Tab; Refill: 3    8. Hep C w/o coma, chronic (HCC)  Status post treatment.  Wants to check her viral load.    - CBC WITH DIFFERENTIAL; Future  - Comp Metabolic Panel; Future  - HCV RNA QUANT, PCR; Future    9. Breast cancer screening    - MA-SCREENING MAMMO BILAT W/CAD; Future    10. Asymptomatic menopausal state    - DS-BONE DENSITY STUDY (DEXA); Future    11. Colon cancer screening    - COLOGUARD (FIT DNA)    12.  Healthcare maintenance  Declined the colonoscopy for now, she is fine to have a Cologuard.  Due for mammogram and bone density, order placed.

## 2020-08-12 ENCOUNTER — TELEMEDICINE (OUTPATIENT)
Dept: BEHAVIORAL HEALTH | Facility: CLINIC | Age: 60
End: 2020-08-12
Payer: COMMERCIAL

## 2020-08-12 VITALS — WEIGHT: 165 LBS | BODY MASS INDEX: 25.9 KG/M2 | HEIGHT: 67 IN

## 2020-08-12 DIAGNOSIS — F33.1 MDD (MAJOR DEPRESSIVE DISORDER), RECURRENT EPISODE, MODERATE (HCC): ICD-10-CM

## 2020-08-12 DIAGNOSIS — F50.81 BINGE-EATING DISORDER, MODERATE: ICD-10-CM

## 2020-08-12 DIAGNOSIS — F51.04 CHRONIC INSOMNIA: ICD-10-CM

## 2020-08-12 DIAGNOSIS — F41.1 GENERALIZED ANXIETY DISORDER: ICD-10-CM

## 2020-08-12 PROCEDURE — 99214 OFFICE O/P EST MOD 30 MIN: CPT | Mod: 95,CR | Performed by: PSYCHIATRY & NEUROLOGY

## 2020-08-12 PROCEDURE — 90833 PSYTX W PT W E/M 30 MIN: CPT | Mod: 95,CR | Performed by: PSYCHIATRY & NEUROLOGY

## 2020-08-12 RX ORDER — CLONAZEPAM 0.5 MG/1
0.5 TABLET ORAL 2 TIMES DAILY
Qty: 60 TAB | Refills: 2 | Status: SHIPPED | OUTPATIENT
Start: 2020-08-29 | End: 2020-12-11

## 2020-08-12 ASSESSMENT — PATIENT HEALTH QUESTIONNAIRE - PHQ9
5. POOR APPETITE OR OVEREATING: 1 - SEVERAL DAYS
SUM OF ALL RESPONSES TO PHQ QUESTIONS 1-9: 17
CLINICAL INTERPRETATION OF PHQ2 SCORE: 5

## 2020-08-12 NOTE — PROGRESS NOTES
PSYCHIATRY TELEMEDICINE FOLLOW-UP NOTE      Chief Complaint   Patient presents with   • Follow-Up     depression, anxiety, eating disorder       This encounter was conducted via Zoom .   Verbal consent was obtained. Patient's identity was verified.    History Of Present Illness:  Sammi Alberto is a 60 y.o. old female with major depressive disorder, generalized anxiety disorder, binge eating disorder, hypothyroidism, s/p lap band surgery in 2010, migraines, hepatitis C, parathyroidectomy, left ACL and meniscal repair comes in today for follow up, was last seen 6 weeks ago she continues to struggle with depression and is having a hard time moving on from her last job.  She has an interview scheduled at [facility] [employer] for Friday for a lab .  She is hopeful that she has a job but was recently told that there will be an interview as there is another candidate and now she is losing some of her hope.  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  She has been physically active and has gone to [employer] a few times and she had a nice time there.  She is also going out on a date later this week but continues to have a hard time moving on from her last relationship.  She is still having dreams about him introducing her to his new girlfriend.  She has never been able to emotionally let go of that relationship which is keeping her from moving on.  She has been compliant with all of her medications.  She was unable to get a refill on her Phentermine and has been taking Vyvanse instead.  She does feel that he eats sweets whenever she is struggling emotionally.  She feels about Phentermine and Vyvanse help her with not eating anything so that she can eat candy.  Sleep has been okay.  She denies any relationship stressors with her son.  She denies having any active thoughts of wanting to hurt herself or others.    Social History:   She is single.  She  "ended a 4-year long relationship in 9/2017.  She has been  and  x1 and has 2 sons from that marriage.  She is close with both her kids and her siblings - 3 sisters and a brother.  She lives in [place] and her younger son lives with her.  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 90 Tab 3   • zolmitriptan (ZOMIG) 5 MG tablet TAKE ONE TABLET BY MOUTH ONCE AS NEEDED FOR MIGRAINE, MAY REPEAT IN 2 HOURS. MAX DOSE IS 2 TABLETS IN 24 HOURS 10 Tab 3   • buPROPion (WELLBUTRIN XL) 150 MG XL tablet Take 3 Tabs by mouth every morning. 270 Tab 0   • fluoxetine (PROZAC) 40 MG capsule Take 1 Cap by mouth every day. 90 Cap 0   • traZODone (DESYREL) 50 MG Tab Take 0.5-1 Tabs by mouth at bedtime as needed for Sleep. 90 Tab 0   • valacyclovir (VALTREX) 1 GM Tab Take 1,000 mg by mouth 2 times a day.       No current facility-administered medications for this visit.        Review Of Systems:    Constitutional - Positive for fatigue  Respiratory - Negative for shortness of breath, cough  CVS - Negative for chest pain, palpitations  GI - Negative for nausea, vomiting, abdominal pain, diarrhea, constipation  Musculoskeletal - Positive for right knee pain  Neurological - Positive for headaches  Psychiatric - Positive for depression, anxiety    Physical Examination:  Vital signs: Ht 1.702 m " "(5' 7\")   Wt 74.8 kg (165 lb)   LMP  (LMP Unknown)   Breastfeeding No   BMI 25.84 kg/m²     Musculoskeletal: No abnormal movements.     Mental Status Evaluation:   General: Middle aged white female, dressed in casual attire, good grooming and hygiene, in no apparent distress, calm and cooperative, good eye contact, psychomotor retardation  Orientation: Alert and oriented to person, place and time  Recent and remote memory: Grossly intact  Attention span and concentration: Grossly intact  Speech: Spontaneous, normal rate, rhythm and tone  Thought Process: Linear, logical and goal directed  Thought Content: Denies suicidal or homicidal ideations, intent or plan  Perception: Denies auditory or visual hallucinations. No delusions noted  Associations: Intact  Language: Appropriate  Fund of knowledge and vocabulary: Grossly adequate  Mood: \"am the same\"  Affect: Dysphoric, mood congruent  Insight: Good  Judgment: Good    Depression screening:  Depression Screen (PHQ-2/PHQ-9) 12/11/2018 7/3/2019 8/12/2020   PHQ-2 Total Score - - -   PHQ-2 Total Score 6 4 5   PHQ-9 Total Score - - -   PHQ-9 Total Score 16 13 17     Interpretation of PHQ-9 Total Score   Score Severity   1-4 No Depression   5-9 Mild Depression   10-14 Moderate Depression   15-19 Moderately Severe Depression   20-27 Severe Depression    Medical Records/Labs/Diagnostic Tests Reviewed:  [place] [facility] records - appropriate refills, no abuse suspected       Impression:  1.  Major depressive disorder, recurrent, moderate - stable  2.  Binge eating disorder, moderate - stable  3.  Generalized anxiety disorder - stable   4.  Chronic insomnia - stable    Plan:  1.  Continue Wellbutrin  mg and Prozac 40 mg daily for depression and anxiety  2.  She is back on Vyvanse 60 mg for binge eating disorder but after she has her right knee surgery she will be going back on Phentermine.  3.  Continue Klonopin 0.5 mg twice daily as needed for anxiety.  E-prescribed for 3 " months.  4.  Continue Trazodone 25 to 50 mg at bedtime as needed for sleep  5.  Provided supportive psychotherapy (> 16 minutes) in regards to her continued depression and having a hard time moving on from her last relationship.  Discussed that she does enjoy life when she is doing something but unfortunately cannot let go of her last relationship which is keeping her from being more happy.  6.  I advised her to reach out to her previous therapist, Dr. Mckenna Jimenez, PhD as she feels that she was only therapist that was able to help her.    Return to clinic in 4 weeks or sooner if symptoms worsen    The proposed treatment plan was discussed with the patient who was provided the opportunity to ask questions and make suggestions regarding alternative treatment. Patient verbalized understanding and expressed agreement with the plan.     Opal Garcia M.D.  08/12/20    This note was created using voice recognition software (Dragon). The accuracy of the dictation is limited by the abilities of the software. I have reviewed the note prior to signing, however some errors in grammar and context are still possible. If you have any questions related to this note please do not hesitate to contact our office.

## 2020-09-15 ENCOUNTER — TELEMEDICINE (OUTPATIENT)
Dept: BEHAVIORAL HEALTH | Facility: CLINIC | Age: 60
End: 2020-09-15
Payer: COMMERCIAL

## 2020-09-15 VITALS — HEIGHT: 67 IN | WEIGHT: 165 LBS | BODY MASS INDEX: 25.9 KG/M2

## 2020-09-15 DIAGNOSIS — F41.1 GENERALIZED ANXIETY DISORDER: ICD-10-CM

## 2020-09-15 DIAGNOSIS — F51.04 CHRONIC INSOMNIA: ICD-10-CM

## 2020-09-15 DIAGNOSIS — F33.1 MDD (MAJOR DEPRESSIVE DISORDER), RECURRENT EPISODE, MODERATE (HCC): ICD-10-CM

## 2020-09-15 DIAGNOSIS — F50.81 BINGE-EATING DISORDER, MODERATE: ICD-10-CM

## 2020-09-15 PROCEDURE — 90833 PSYTX W PT W E/M 30 MIN: CPT | Mod: 95,CR | Performed by: PSYCHIATRY & NEUROLOGY

## 2020-09-15 PROCEDURE — 99214 OFFICE O/P EST MOD 30 MIN: CPT | Mod: 95,CR | Performed by: PSYCHIATRY & NEUROLOGY

## 2020-09-15 RX ORDER — FLUOXETINE HYDROCHLORIDE 40 MG/1
40 CAPSULE ORAL DAILY
Qty: 90 CAP | Refills: 0 | Status: SHIPPED | OUTPATIENT
Start: 2020-09-15 | End: 2020-12-11 | Stop reason: SDUPTHER

## 2020-09-15 RX ORDER — BUPROPION HYDROCHLORIDE 150 MG/1
450 TABLET ORAL EVERY MORNING
Qty: 270 TAB | Refills: 0 | Status: SHIPPED | OUTPATIENT
Start: 2020-09-15 | End: 2020-12-11 | Stop reason: SDUPTHER

## 2020-09-15 RX ORDER — TRAZODONE HYDROCHLORIDE 50 MG/1
25-50 TABLET ORAL NIGHTLY PRN
Qty: 90 TAB | Refills: 0 | Status: SHIPPED | OUTPATIENT
Start: 2020-09-15 | End: 2020-12-11 | Stop reason: SDUPTHER

## 2020-09-15 NOTE — PROGRESS NOTES
PSYCHIATRY VIRTUAL VISIT FOLLOW-UP NOTE      Chief Complaint   Patient presents with   • Follow-Up     depression, anxiety       This evaluation was conducted via Zoom using secure and encrypted videoconferencing technology. The patient was in a private location in the [facility].    The patient's identity was confirmed and verbal consent was obtained for this virtual visit.    History Of Present Illness:  Sammi Alberto is a 60 y.o. old female with major depressive disorder, generalized anxiety disorder, binge eating disorder, hypothyroidism, s/p lap band surgery in 2010, migraines, hepatitis C, parathyroidectomy, left ACL and meniscal repair comes in today for follow up, was last seen 4 weeks ago.  She is doing the same in regards to her depression and anxiety.  She did not get the job at [employer] but plans on applying for another position that might open up soon.  She is still doing to per pined[employer] jobs but is disappointed that she did not get the job.  She started seeing her previous therapist and they will be starting EMDR therapy hopefully soon to work on her last relationship.  She is still having a hard time forgiving herself because of missing the red flags in that relationship.  She is unable to see that she can be happier in the future because she is unable to let go of that relationship.  She is compliant with all of her medications.  She is still struggling with her eating disorder and sometimes just eats candy rather than food.  She hardly cooks for herself even though she cook for her family in the past.  She denies any active thoughts, intent or plan of wanting to hurt herself.    Social History:   She is single.  She ended a 4-year long relationship in 9/2017.  She has been  and  x1 and has 2 sons from that marriage.  She is close with both her kids and her siblings - 3 sisters and a brother.  She lives in [place] and her younger son lives with her.  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 60 Tab 2   • levothyroxine (SYNTHROID) 112 MCG Tab Take 1 Tab by mouth Every morning on an empty stomach. 90 Tab 3   • zolmitriptan (ZOMIG) 5 MG tablet TAKE ONE TABLET BY MOUTH ONCE AS NEEDED FOR MIGRAINE, MAY REPEAT IN 2 HOURS. MAX DOSE IS 2 TABLETS IN 24 HOURS 10 Tab 3   • buPROPion (WELLBUTRIN XL) 150 MG XL tablet Take 3 Tabs by mouth every morning. 270 Tab 0   • fluoxetine (PROZAC) 40 MG capsule Take 1 Cap by mouth every day. 90 Cap 0   • traZODone (DESYREL) 50 MG Tab Take 0.5-1 Tabs by mouth at bedtime as needed for Sleep. 90 Tab 0   • valacyclovir (VALTREX) 1 GM Tab Take 1,000 mg by mouth 2 times a day.       No current facility-administered medications for this visit.        Review Of Systems:    Constitutional - Positive for fatigue  Respiratory - Negative for shortness of breath, cough  CVS - Negative for chest pain, palpitations  GI - Negative for nausea, vomiting, abdominal pain, diarrhea, constipation  Musculoskeletal - Positive for right knee pain  Neurological - Positive for headaches  Psychiatric - Positive for depression, anxiety    Physical Examination:  Vital signs: Ht 1.702 m (5' 7\")   Wt 74.8 kg (165 lb)   LMP  (LMP Unknown)   Breastfeeding No   BMI 25.84 kg/m²     Musculoskeletal: No abnormal movements.     Mental Status Evaluation:   General: Middle aged white " "female, dressed in casual attire, good grooming and hygiene, in no apparent distress, calm and cooperative, good eye contact, psychomotor retardation  Orientation: Alert and oriented to person, place and time  Recent and remote memory: Grossly intact  Attention span and concentration: Grossly intact  Speech: Spontaneous, normal rate, rhythm and tone  Thought Process: Linear, logical and goal directed  Thought Content: Denies suicidal or homicidal ideations, intent or plan  Perception: Denies auditory or visual hallucinations. No delusions noted  Associations: Intact  Language: Appropriate  Fund of knowledge and vocabulary: Grossly adequate  Mood: \"fine\"  Affect: Dysphoric, mood congruent  Insight: Good  Judgment: Good    Depression screening:  Depression Screen (PHQ-2/PHQ-9) 12/11/2018 7/3/2019 8/12/2020   PHQ-2 Total Score - - -   PHQ-2 Total Score 6 4 5   PHQ-9 Total Score - - -   PHQ-9 Total Score 16 13 17     Interpretation of PHQ-9 Total Score   Score Severity   1-4 No Depression   5-9 Mild Depression   10-14 Moderate Depression   15-19 Moderately Severe Depression   20-27 Severe Depression    Medical Records/Labs/Diagnostic Tests Reviewed:  [place]  records - appropriate refills, no abuse suspected       Impression:  1.  Major depressive disorder, recurrent, moderate - stable  2.  Binge eating disorder, moderate - stable  3.  Generalized anxiety disorder - stable   4.  Chronic insomnia - stable    Plan:  1.  Continue Wellbutrin  mg and Prozac 40 mg daily for depression and anxiety  3.  Continue Klonopin 0.5 mg twice daily as needed for anxiety. Not prescribed today.  4.  Continue Trazodone 25 to 50 mg at bedtime as needed for sleep  5.  Provided supportive psychotherapy (> 16 minutes).  Discussed why she has not been able to move on from her last relationship that ended 3 years ago.  Discussed self-care and how just cooking for herself as a way that she can start caring for herself.  6.  Continue " individual psychotherapy with Dr. Mckenna Jimenez, Ph.D    Return to clinic in 4 weeks or sooner if symptoms worsen    The proposed treatment plan was discussed with the patient who was provided the opportunity to ask questions and make suggestions regarding alternative treatment. Patient verbalized understanding and expressed agreement with the plan.     Opal Garcia M.D.  09/15/20    This note was created using voice recognition software (Dragon). The accuracy of the dictation is limited by the abilities of the software. I have reviewed the note prior to signing, however some errors in grammar and context are still possible. If you have any questions related to this note please do not hesitate to contact our office.

## 2020-09-21 ENCOUNTER — NON-PROVIDER VISIT (OUTPATIENT)
Dept: OCCUPATIONAL MEDICINE | Facility: CLINIC | Age: 60
End: 2020-09-21

## 2020-09-21 DIAGNOSIS — Z23 NEED FOR VACCINATION: Primary | ICD-10-CM

## 2020-09-21 PROCEDURE — 90686 IIV4 VACC NO PRSV 0.5 ML IM: CPT | Performed by: PREVENTIVE MEDICINE

## 2020-09-21 PROCEDURE — 90746 HEPB VACCINE 3 DOSE ADULT IM: CPT | Performed by: PREVENTIVE MEDICINE

## 2020-10-14 ENCOUNTER — TELEMEDICINE (OUTPATIENT)
Dept: BEHAVIORAL HEALTH | Facility: CLINIC | Age: 60
End: 2020-10-14
Payer: COMMERCIAL

## 2020-10-14 VITALS — WEIGHT: 156 LBS | BODY MASS INDEX: 23.11 KG/M2 | HEIGHT: 69 IN

## 2020-10-14 DIAGNOSIS — F50.81 BINGE-EATING DISORDER, MODERATE: ICD-10-CM

## 2020-10-14 DIAGNOSIS — F33.1 MDD (MAJOR DEPRESSIVE DISORDER), RECURRENT EPISODE, MODERATE (HCC): ICD-10-CM

## 2020-10-14 DIAGNOSIS — F41.1 GENERALIZED ANXIETY DISORDER: ICD-10-CM

## 2020-10-14 DIAGNOSIS — F51.04 CHRONIC INSOMNIA: ICD-10-CM

## 2020-10-14 PROCEDURE — 99214 OFFICE O/P EST MOD 30 MIN: CPT | Mod: 95,CR | Performed by: PSYCHIATRY & NEUROLOGY

## 2020-10-14 NOTE — PROGRESS NOTES
PSYCHIATRY VIRTUAL VISIT FOLLOW-UP NOTE      Chief Complaint   Patient presents with   • Follow-Up     depression, anxiety       This evaluation was conducted via Zoom using secure and encrypted videoconferencing technology. The patient was in a private location in the state of [place].    The patient's identity was confirmed and verbal consent was obtained for this virtual visit.    History Of Present Illness:  Samim Alberto is a 60 y.o. female with major depressive disorder, generalized anxiety disorder, binge eating disorder, hypothyroidism, s/p lap band surgery in 2010, migraines, hepatitis C, parathyroidectomy, left ACL and meniscal repair comes in today for follow up, was last seen 4 weeks ago.  She has been doing all right in regards to her depression and anxiety.  She has been seeing her therapist on a regular basis and they will be starting EMDR therapy soon.  She has been having some financial stressors since she lost her job.  She is looking forward to another job interview at [employer] but if she does not get if she cannot be applying to other out-of-state jobs.  Her son also moved out to [place] which was a little hard for her.  She is still having a hard time taking care of herself.  She did get a cut back on fresh fruit for herself but finds it hard to take care of herself by cooking.  She is still using candy and sweets for coping and sometimes eats toast for dinner.  She has been doing all right in regards to her sleep.  She recently had arthroscopic surgery on her knees and is doing physical therapy.  She denies active thoughts, intent or plan of wanting to hurt herself.      Social History:   She is single.  She ended a 4-year long relationship in 9/2017.  She has been  and  x1 and has 2 sons from that marriage.  She is close with both her kids and her siblings - 3 sisters and a brother.  She lives in [place].  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 20 Cap 0   • aspirin (ASA) 325 MG Tab Take 1 Tab by mouth every 12 hours for 21 days. 42 Tab 0   • buPROPion (WELLBUTRIN XL) 150 MG XL tablet Take 3 Tabs by mouth every morning. 270 Tab 0   • fluoxetine (PROZAC) 40 MG capsule Take 1 Cap by mouth every day. 90 Cap 0   • traZODone (DESYREL) 50 MG Tab Take 0.5-1 Tabs by mouth at bedtime as needed for Sleep. 90 Tab 0   • levothyroxine (SYNTHROID) 112 MCG Tab Take 1 Tab by mouth Every morning on an empty stomach. 90 Tab 3   • zolmitriptan (ZOMIG) 5 MG tablet TAKE ONE TABLET BY MOUTH ONCE AS NEEDED FOR MIGRAINE, MAY REPEAT IN 2 HOURS. MAX DOSE IS 2 TABLETS IN 24 HOURS 10 Tab 3   • valacyclovir (VALTREX) 1 GM Tab Take 1,000 mg by mouth 2 times a day.       No current facility-administered medications for this visit.        Review Of Systems:    Constitutional - Positive for fatigue  Respiratory - Negative for shortness of breath, cough  CVS - Negative for chest pain, palpitations  GI - Negative for nausea, vomiting, abdominal pain, diarrhea, constipation  Musculoskeletal - Positive for knee pain  Neurological - Positive for headaches  Psychiatric - Positive for depression, anxiety, sleep problems    Physical Examination:  Vital signs: Ht 1.753 m (5' 9\")   Wt 70.8 kg (156 lb)   LMP  (LMP Unknown)   BMI 23.04 kg/m²     Musculoskeletal: No abnormal movements.     Mental Status " "Evaluation:   General: Middle aged white female, dressed in casual attire, good grooming and hygiene, in no apparent distress, calm and cooperative, good eye contact, psychomotor retardation  Orientation: Alert and oriented to person, place and time  Recent and remote memory: Grossly intact  Attention span and concentration: Grossly intact  Speech: Spontaneous, normal rate, rhythm and tone  Thought Process: Linear, logical and goal directed  Thought Content: Denies suicidal or homicidal ideations, intent or plan  Perception: Denies auditory or visual hallucinations. No delusions noted  Associations: Intact  Language: Appropriate  Fund of knowledge and vocabulary: Grossly adequate  Mood: \"about the same\"  Affect: Dysphoric, mood congruent  Insight: Good  Judgment: Good    Depression screening:  Depression Screen (PHQ-2/PHQ-9) 12/11/2018 7/3/2019 8/12/2020   PHQ-2 Total Score - - -   PHQ-2 Total Score 6 4 5   PHQ-9 Total Score - - -   PHQ-9 Total Score 16 13 17     Interpretation of PHQ-9 Total Score   Score Severity   1-4 No Depression   5-9 Mild Depression   10-14 Moderate Depression   15-19 Moderately Severe Depression   20-27 Severe Depression    Medical Records/Labs/Diagnostic Tests Reviewed:  [place]  records - appropriate refills, no abuse suspected       Impression:  1.  Major depressive disorder, recurrent, moderate - stable  2.  Binge eating disorder, moderate - stable  3.  Generalized anxiety disorder - stable   4.  Chronic insomnia - stable    Plan:  1.  Continue Wellbutrin  mg and Prozac 40 mg daily for depression and anxiety  2.  Continue Klonopin 0.5 mg twice daily as needed for anxiety. Not prescribed today.  3.  Continue Trazodone 25 to 50 mg at bedtime as needed for sleep  4.  Continue individual psychotherapy with Dr. Mckenna Jimenez, Ph.D    Return to clinic in 2 months or sooner if symptoms worsen    The proposed treatment plan was discussed with the patient who was provided the opportunity to " ask questions and make suggestions regarding alternative treatment. Patient verbalized understanding and expressed agreement with the plan.     Opal Garcia M.D.  10/14/20    This note was created using voice recognition software (Dragon). The accuracy of the dictation is limited by the abilities of the software. I have reviewed the note prior to signing, however some errors in grammar and context are still possible. If you have any questions related to this note please do not hesitate to contact our office.

## 2020-10-28 ENCOUNTER — HOSPITAL ENCOUNTER (OUTPATIENT)
Dept: LAB | Facility: MEDICAL CENTER | Age: 60
End: 2020-10-28
Attending: FAMILY MEDICINE
Payer: COMMERCIAL

## 2020-10-28 DIAGNOSIS — B18.2 HEP C W/O COMA, CHRONIC (HCC): ICD-10-CM

## 2020-10-28 DIAGNOSIS — E03.9 HYPOTHYROIDISM, UNSPECIFIED TYPE: ICD-10-CM

## 2020-10-28 DIAGNOSIS — F33.1 MDD (MAJOR DEPRESSIVE DISORDER), RECURRENT EPISODE, MODERATE (HCC): ICD-10-CM

## 2020-10-28 LAB
ALBUMIN SERPL BCP-MCNC: 4.5 G/DL (ref 3.2–4.9)
ALBUMIN/GLOB SERPL: 2.1 G/DL
ALP SERPL-CCNC: 67 U/L (ref 30–99)
ALT SERPL-CCNC: 12 U/L (ref 2–50)
ANION GAP SERPL CALC-SCNC: 9 MMOL/L (ref 7–16)
AST SERPL-CCNC: 17 U/L (ref 12–45)
BASOPHILS # BLD AUTO: 0.6 % (ref 0–1.8)
BASOPHILS # BLD: 0.04 K/UL (ref 0–0.12)
BILIRUB SERPL-MCNC: 0.5 MG/DL (ref 0.1–1.5)
BUN SERPL-MCNC: 12 MG/DL (ref 8–22)
CALCIUM SERPL-MCNC: 9.3 MG/DL (ref 8.5–10.5)
CHLORIDE SERPL-SCNC: 103 MMOL/L (ref 96–112)
CHOLEST SERPL-MCNC: 234 MG/DL (ref 100–199)
CO2 SERPL-SCNC: 27 MMOL/L (ref 20–33)
CREAT SERPL-MCNC: 0.75 MG/DL (ref 0.5–1.4)
EOSINOPHIL # BLD AUTO: 0.12 K/UL (ref 0–0.51)
EOSINOPHIL NFR BLD: 1.8 % (ref 0–6.9)
ERYTHROCYTE [DISTWIDTH] IN BLOOD BY AUTOMATED COUNT: 45.1 FL (ref 35.9–50)
GLOBULIN SER CALC-MCNC: 2.1 G/DL (ref 1.9–3.5)
GLUCOSE SERPL-MCNC: 82 MG/DL (ref 65–99)
HCT VFR BLD AUTO: 44.7 % (ref 37–47)
HDLC SERPL-MCNC: 87 MG/DL
HGB BLD-MCNC: 14.5 G/DL (ref 12–16)
IMM GRANULOCYTES # BLD AUTO: 0.02 K/UL (ref 0–0.11)
IMM GRANULOCYTES NFR BLD AUTO: 0.3 % (ref 0–0.9)
LDLC SERPL CALC-MCNC: 136 MG/DL
LYMPHOCYTES # BLD AUTO: 1.66 K/UL (ref 1–4.8)
LYMPHOCYTES NFR BLD: 24.8 % (ref 22–41)
MCH RBC QN AUTO: 31 PG (ref 27–33)
MCHC RBC AUTO-ENTMCNC: 32.4 G/DL (ref 33.6–35)
MCV RBC AUTO: 95.7 FL (ref 81.4–97.8)
MONOCYTES # BLD AUTO: 0.52 K/UL (ref 0–0.85)
MONOCYTES NFR BLD AUTO: 7.8 % (ref 0–13.4)
NEUTROPHILS # BLD AUTO: 4.34 K/UL (ref 2–7.15)
NEUTROPHILS NFR BLD: 64.7 % (ref 44–72)
NRBC # BLD AUTO: 0 K/UL
NRBC BLD-RTO: 0 /100 WBC
PLATELET # BLD AUTO: 269 K/UL (ref 164–446)
PMV BLD AUTO: 10.2 FL (ref 9–12.9)
POTASSIUM SERPL-SCNC: 3.8 MMOL/L (ref 3.6–5.5)
PROT SERPL-MCNC: 6.6 G/DL (ref 6–8.2)
RBC # BLD AUTO: 4.67 M/UL (ref 4.2–5.4)
SODIUM SERPL-SCNC: 139 MMOL/L (ref 135–145)
TRIGL SERPL-MCNC: 56 MG/DL (ref 0–149)
TSH SERPL DL<=0.005 MIU/L-ACNC: 1.85 UIU/ML (ref 0.38–5.33)
WBC # BLD AUTO: 6.7 K/UL (ref 4.8–10.8)

## 2020-10-28 PROCEDURE — 84443 ASSAY THYROID STIM HORMONE: CPT

## 2020-10-28 PROCEDURE — 87522 HEPATITIS C REVRS TRNSCRPJ: CPT

## 2020-10-28 PROCEDURE — 85025 COMPLETE CBC W/AUTO DIFF WBC: CPT

## 2020-10-28 PROCEDURE — 80053 COMPREHEN METABOLIC PANEL: CPT

## 2020-10-28 PROCEDURE — 36415 COLL VENOUS BLD VENIPUNCTURE: CPT

## 2020-10-28 PROCEDURE — 80061 LIPID PANEL: CPT

## 2020-11-01 LAB
HCV RNA SERPL NAA+PROBE-ACNC: NOT DETECTED IU/ML
HCV RNA SERPL NAA+PROBE-LOG IU: NOT DETECTED LOG IU/ML
HCV RNA SERPL QL NAA+PROBE: NOT DETECTED

## 2020-12-09 DIAGNOSIS — F33.1 MDD (MAJOR DEPRESSIVE DISORDER), RECURRENT EPISODE, MODERATE (HCC): ICD-10-CM

## 2020-12-09 DIAGNOSIS — F41.1 GENERALIZED ANXIETY DISORDER: ICD-10-CM

## 2020-12-09 DIAGNOSIS — F50.81 BINGE-EATING DISORDER, MODERATE: ICD-10-CM

## 2020-12-09 DIAGNOSIS — F51.04 CHRONIC INSOMNIA: ICD-10-CM

## 2020-12-11 DIAGNOSIS — F41.1 GENERALIZED ANXIETY DISORDER: ICD-10-CM

## 2020-12-11 RX ORDER — TRAZODONE HYDROCHLORIDE 50 MG/1
25-50 TABLET ORAL NIGHTLY PRN
Qty: 90 TAB | Refills: 0 | Status: SHIPPED | OUTPATIENT
Start: 2020-12-11 | End: 2021-01-06 | Stop reason: SDUPTHER

## 2020-12-11 RX ORDER — CLONAZEPAM 0.5 MG/1
0.5 TABLET ORAL 2 TIMES DAILY
Qty: 60 TAB | Refills: 2 | Status: SHIPPED | OUTPATIENT
Start: 2020-12-11 | End: 2021-03-08

## 2020-12-11 RX ORDER — FLUOXETINE HYDROCHLORIDE 40 MG/1
40 CAPSULE ORAL DAILY
Qty: 90 CAP | Refills: 0 | Status: SHIPPED | OUTPATIENT
Start: 2020-12-11 | End: 2021-01-06 | Stop reason: SDUPTHER

## 2020-12-11 RX ORDER — LISDEXAMFETAMINE DIMESYLATE 60 MG/1
60 CAPSULE ORAL EVERY MORNING
Qty: 30 CAP | Refills: 0 | Status: SHIPPED | OUTPATIENT
Start: 2020-12-11 | End: 2021-01-10

## 2020-12-11 RX ORDER — BUPROPION HYDROCHLORIDE 150 MG/1
450 TABLET ORAL EVERY MORNING
Qty: 270 TAB | Refills: 0 | Status: SHIPPED | OUTPATIENT
Start: 2020-12-11 | End: 2021-01-06 | Stop reason: SDUPTHER

## 2020-12-11 NOTE — TELEPHONE ENCOUNTER
I called and left a voicemail to ask the patient. Will call back to follow up before the end of the day.

## 2020-12-11 NOTE — TELEPHONE ENCOUNTER
Patient requested a fill for her other meds as well.     She stopped taking Phentermine about 6 weeks ago because it made her jittery and as for a suppressant she felt that it worked the same as Vyvanse. The benefit with taking Vyvanse and not Phentermine, is that she can focus a lot better and there are not jitters.

## 2020-12-14 NOTE — TELEPHONE ENCOUNTER
Was the patient seen in the last year in this department? Yes    Does patient have an active prescription for medications requested? No     Received Request Via: Pharmacy    
15-Apr-2020

## 2020-12-20 DIAGNOSIS — Z23 NEED FOR VACCINATION: ICD-10-CM

## 2021-01-06 ENCOUNTER — TELEMEDICINE (OUTPATIENT)
Dept: BEHAVIORAL HEALTH | Facility: CLINIC | Age: 61
End: 2021-01-06
Payer: COMMERCIAL

## 2021-01-06 VITALS — HEIGHT: 69 IN | BODY MASS INDEX: 23.04 KG/M2

## 2021-01-06 DIAGNOSIS — F33.0 MDD (MAJOR DEPRESSIVE DISORDER), RECURRENT EPISODE, MILD (HCC): ICD-10-CM

## 2021-01-06 DIAGNOSIS — F50.81 BINGE-EATING DISORDER, MODERATE: ICD-10-CM

## 2021-01-06 DIAGNOSIS — F51.04 CHRONIC INSOMNIA: ICD-10-CM

## 2021-01-06 DIAGNOSIS — F41.1 GENERALIZED ANXIETY DISORDER: ICD-10-CM

## 2021-01-06 PROBLEM — F33.1 MDD (MAJOR DEPRESSIVE DISORDER), RECURRENT EPISODE, MODERATE (HCC): Status: RESOLVED | Noted: 2017-02-03 | Resolved: 2021-01-06

## 2021-01-06 PROCEDURE — 99214 OFFICE O/P EST MOD 30 MIN: CPT | Mod: 95,CR | Performed by: PSYCHIATRY & NEUROLOGY

## 2021-01-06 PROCEDURE — 90833 PSYTX W PT W E/M 30 MIN: CPT | Mod: 95,CR | Performed by: PSYCHIATRY & NEUROLOGY

## 2021-01-06 RX ORDER — LISDEXAMFETAMINE DIMESYLATE CAPSULES 60 MG/1
60 CAPSULE ORAL EVERY MORNING
Qty: 30 CAP | Refills: 0 | Status: SHIPPED | OUTPATIENT
Start: 2021-01-10 | End: 2021-02-09

## 2021-01-06 RX ORDER — TOPIRAMATE 25 MG/1
50 TABLET ORAL
COMMUNITY
Start: 2021-01-01 | End: 2021-08-31 | Stop reason: SDUPTHER

## 2021-01-06 RX ORDER — BUPROPION HYDROCHLORIDE 150 MG/1
450 TABLET ORAL EVERY MORNING
Qty: 270 TAB | Refills: 0 | Status: SHIPPED
Start: 2021-01-06 | End: 2021-04-01 | Stop reason: DRUGHIGH

## 2021-01-06 RX ORDER — TRAZODONE HYDROCHLORIDE 50 MG/1
25-50 TABLET ORAL NIGHTLY PRN
Qty: 90 TAB | Refills: 0 | Status: SHIPPED
Start: 2021-01-06 | End: 2021-04-01

## 2021-01-06 RX ORDER — LISDEXAMFETAMINE DIMESYLATE CAPSULES 60 MG/1
60 CAPSULE ORAL EVERY MORNING
Qty: 30 CAP | Refills: 0 | Status: SHIPPED | OUTPATIENT
Start: 2021-03-09 | End: 2021-04-01 | Stop reason: SDUPTHER

## 2021-01-06 RX ORDER — LISDEXAMFETAMINE DIMESYLATE CAPSULES 60 MG/1
60 CAPSULE ORAL EVERY MORNING
Qty: 30 CAP | Refills: 0 | Status: SHIPPED | OUTPATIENT
Start: 2021-02-08 | End: 2021-03-10

## 2021-01-06 RX ORDER — FLUOXETINE HYDROCHLORIDE 40 MG/1
40 CAPSULE ORAL DAILY
Qty: 90 CAP | Refills: 0 | Status: SHIPPED | OUTPATIENT
Start: 2021-01-06 | End: 2021-04-01

## 2021-01-06 NOTE — PROGRESS NOTES
PSYCHIATRY VIRTUAL VISIT FOLLOW-UP NOTE      Chief Complaint   Patient presents with   • Follow-Up     anxiety, depression, eating disorder       This evaluation was conducted via Zoom using secure and encrypted videoconferencing technology. The patient was in a private location in the state of [place].    The patient's identity was confirmed and verbal consent was obtained for this virtual visit.    History Of Present Illness:  Sammi Alberto is a 60 y.o. female with major depressive disorder, generalized anxiety disorder, binge eating disorder, hypothyroidism, s/p lap band surgery in 2010, migraines, hepatitis C, parathyroidectomy, left ACL and meniscal repair comes in today for follow up, was last seen over 2 months ago.  She has been doing all right in regards to her anxiety and depression.  She feels that her depression has been doing better than before.  She still has a day-to-day struggles with overwhelming depression is no longer there.  She has not had any contact with her last boyfriend and feels that his thoughts are becoming less intrusive as well.  She is still having a hard time going skiing by herself as this was an activity that she really enjoyed doing with him.  She is now on Topamax for migraine prevention and it has been helpful for her headaches as well as sleep.  She is back on Vyvanse and feels that she has been doing better in regards to her binge eating as well.  She is no longer taking phentermine.  She continues to be compliant with all of her medications and is endorsing benefit from all.  She is struggling financially significantly and is now looking for jobs outside of [place].  She has 2 per [employer] jobs but unfortunately because of 2 disciplinary actions against her at [employer] she cannot apply for a full-time job.  She is also thinking about selling her home in [place] and moving to a different state.  She had nice quiet holidays with her son.  She has been using her bike on a daily basis  "and is also in physical therapy for her knee.      Social History:   She is single, ended a 4-year long relationship in 9/2017,  and  x1 and has 2 sons from that marriage, close with both kids and siblings - 3 sisters and a brother, lives in [place], works per [employer]ined[employer] at [employer] and at a hospital in [place] in the [facility].    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     • VYVANSE 60 MG Cap Take 1 Cap by mouth every morning for 30 days. 30 Cap 0   • clonazePAM (KLONOPIN) 0.5 MG Tab Take 1 Tab by mouth 2 times a day for 30 days. 60 Tab 2   • traZODone (DESYREL) 50 MG Tab Take 0.5-1 Tabs by mouth at bedtime as needed for Sleep. 90 Tab 0   • fluoxetine (PROZAC) 40 MG capsule Take 1 Cap by mouth every day. 90 Cap 0   • buPROPion (WELLBUTRIN XL) 150 MG XL tablet Take 3 Tabs by mouth every morning. 270 Tab 0   • levothyroxine (SYNTHROID) 112 MCG Tab Take 1 Tab by mouth Every morning on an empty stomach. 90 Tab 3   • zolmitriptan (ZOMIG) 5 MG tablet TAKE ONE TABLET BY MOUTH ONCE AS NEEDED FOR MIGRAINE, MAY REPEAT IN 2 HOURS. MAX DOSE IS 2 TABLETS IN 24 HOURS 10 Tab 3     No current facility-administered medications for this visit.        Review Of Systems:    Constitutional - Positive for fatigue  Psychiatric - Positive for depression, anxiety, sleep problems    Physical Examination:  Vital signs: Ht 1.753 m (5' 9\")   LMP  (LMP Unknown)   BMI 23.04 kg/m² " "    Musculoskeletal: No abnormal movements.     Mental Status Evaluation:   General: Middle aged white female, dressed in casual attire, good grooming and hygiene, in no apparent distress, calm and cooperative, good eye contact, psychomotor retardation  Orientation: Alert and oriented to person, place and time  Recent and remote memory: Grossly intact  Attention span and concentration: Grossly intact  Speech: Spontaneous, normal rate, rhythm and tone  Thought Process: Linear, logical and goal directed  Thought Content: Denies suicidal or homicidal ideations, intent or plan  Perception: Denies auditory or visual hallucinations. No delusions noted  Associations: Intact  Language: Appropriate  Fund of knowledge and vocabulary: Grossly adequate  Mood: \"okay\"  Affect: Euthymic, mood congruent  Insight: Good  Judgment: Good    Depression screening:  Depression Screen (PHQ-2/PHQ-9) 12/11/2018 7/3/2019 8/12/2020   PHQ-2 Total Score - - -   PHQ-2 Total Score 6 4 5   PHQ-9 Total Score - - -   PHQ-9 Total Score 16 13 17     Interpretation of PHQ-9 Total Score   Score Severity   1-4 No Depression   5-9 Mild Depression   10-14 Moderate Depression   15-19 Moderately Severe Depression   20-27 Severe Depression    Medical Records/Labs/Diagnostic Tests Reviewed:  [place]  records - appropriate refills, no abuse suspected       Impression:  1.  Major depressive disorder, recurrent, mild  2.  Binge eating disorder, moderate  3.  Generalized anxiety disorder   4.  Chronic insomnia     Plan:  1.  Continue Wellbutrin  mg and Prozac 40 mg daily for depression and anxiety  2.  Continue Klonopin 0.5 mg twice daily as needed for anxiety. Not prescribed today.  3.  Continue Trazodone 25 to 50 mg at bedtime as needed for sleep  4.  Continue Vyvanse 60 mg in the morning for binge eating disorder.  E-prescribed x 3 months.  5.  She is struggling financially and I will push her appointment out to 3 months.  I let her know that if she is " struggling with her mental health she can contact me through [employer].  6.  Provided supportive psychotherapy (> 16 minutes) in regards to her anxiety and depression.  Encouraged her positive thinking and being able to let go of her last relationship.  Discussed some positive Annabel that I had noticed in her thinking and improvements that have happened in the last few months even though she is struggling financially.    Return to clinic in 3 months or sooner if symptoms worsen    The proposed treatment plan was discussed with the patient who was provided the opportunity to ask questions and make suggestions regarding alternative treatment. Patient verbalized understanding and expressed agreement with the plan.     Opal Garcia M.D.  01/06/21    This note was created using voice recognition software (Dragon). The accuracy of the dictation is limited by the abilities of the software. I have reviewed the note prior to signing, however some errors in grammar and context are still possible. If you have any questions related to this note please do not hesitate to contact our office.

## 2021-03-08 DIAGNOSIS — F41.1 GENERALIZED ANXIETY DISORDER: ICD-10-CM

## 2021-03-08 RX ORDER — CLONAZEPAM 0.5 MG/1
0.5 TABLET ORAL 2 TIMES DAILY
Qty: 60 TABLET | Refills: 0 | Status: SHIPPED | OUTPATIENT
Start: 2021-03-08 | End: 2021-04-01 | Stop reason: SDUPTHER

## 2021-04-01 ENCOUNTER — TELEMEDICINE (OUTPATIENT)
Dept: BEHAVIORAL HEALTH | Facility: CLINIC | Age: 61
End: 2021-04-01

## 2021-04-01 VITALS — WEIGHT: 156 LBS | BODY MASS INDEX: 23.11 KG/M2 | HEIGHT: 69 IN

## 2021-04-01 DIAGNOSIS — F41.1 GENERALIZED ANXIETY DISORDER: ICD-10-CM

## 2021-04-01 DIAGNOSIS — F33.41 MDD (MAJOR DEPRESSIVE DISORDER), RECURRENT, IN PARTIAL REMISSION (HCC): ICD-10-CM

## 2021-04-01 DIAGNOSIS — F50.81 BINGE-EATING DISORDER, MODERATE: ICD-10-CM

## 2021-04-01 PROCEDURE — 99214 OFFICE O/P EST MOD 30 MIN: CPT | Mod: 95,CR | Performed by: PSYCHIATRY & NEUROLOGY

## 2021-04-01 RX ORDER — CLONAZEPAM 0.5 MG/1
0.5 TABLET ORAL 2 TIMES DAILY PRN
Qty: 30 TABLET | Refills: 2 | Status: SHIPPED | OUTPATIENT
Start: 2021-04-07 | End: 2021-05-07

## 2021-04-01 RX ORDER — LISDEXAMFETAMINE DIMESYLATE CAPSULES 60 MG/1
60 CAPSULE ORAL EVERY MORNING
Qty: 30 CAPSULE | Refills: 0 | Status: SHIPPED
Start: 2021-05-16 | End: 2021-06-01

## 2021-04-01 RX ORDER — LISDEXAMFETAMINE DIMESYLATE CAPSULES 60 MG/1
60 CAPSULE ORAL EVERY MORNING
Qty: 30 CAPSULE | Refills: 0 | Status: SHIPPED
Start: 2021-06-14 | End: 2021-06-01

## 2021-04-01 RX ORDER — BUPROPION HYDROCHLORIDE 300 MG/1
300 TABLET ORAL EVERY MORNING
Qty: 30 TABLET | Refills: 0 | Status: SHIPPED | OUTPATIENT
Start: 2021-04-01 | End: 2021-05-19 | Stop reason: SDUPTHER

## 2021-04-01 RX ORDER — LISDEXAMFETAMINE DIMESYLATE CAPSULES 60 MG/1
60 CAPSULE ORAL EVERY MORNING
Qty: 30 CAPSULE | Refills: 0 | Status: SHIPPED | OUTPATIENT
Start: 2021-04-17 | End: 2021-05-17

## 2021-04-01 ASSESSMENT — FIBROSIS 4 INDEX: FIB4 SCORE: 1.11

## 2021-04-01 NOTE — PROGRESS NOTES
PSYCHIATRY VIRTUAL VISIT FOLLOW-UP NOTE      Chief Complaint   Patient presents with   • Follow-Up     depression, anxiety, eating disorder       This evaluation was conducted via Zoom using secure and encrypted videoconferencing technology. The patient was in a private location in the [facility].    The patient's identity was confirmed and verbal consent was obtained for this virtual visit.    History Of Present Illness:  Sammi Alberto is a 60 y.o. female with major depressive disorder, generalized anxiety disorder, binge eating disorder, hypothyroidism, s/p lap band surgery in 2010, migraines, hepatitis C, parathyroidectomy, left ACL and meniscal repair comes in today for follow up, was last seen about 3 months ago.  She has been doing better in regards to her anxiety and depression in the last few months.  She has been physically active which has helped a lot.  She is exercising and biking on a regular basis, meeting her friend and taking their dogs on a walk together and playing pickle ball.  She decided to taper herself off Prozac and has been completely off it for a month and has not noticed any changes in her anxiety or depression.  She also got a full-time position at [employer] which she starts on Monday and she is looking forward to it.  She does feel that she is finally feeling better after a long time.  She is doing Atarax gabapentin eating disorder as well.  She feels that the Vyvanse has been helpful and she has not used candy recently.  She is doing good in regards to her sleep and is not taking trazodone as well.  She feels that the Topamax has helped her migraines as well as sleep.  She continues to stay in touch with her boys.  She denies having thoughts of wanting to hurt herself.    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 30 capsule 0   • [START ON 5/16/2021] Lisdexamfetamine Dimesylate 60 MG Cap Take 1 capsule by mouth every morning for 30 days. 30 capsule 0   • [START ON 4/17/2021] Lisdexamfetamine Dimesylate 60 MG Cap Take 1 capsule by mouth every morning for 30 days. 30 capsule 0   • buPROPion (WELLBUTRIN XL) 300 MG XL tablet Take 1 tablet by mouth every morning. 30 tablet 0   • [START ON 4/7/2021] clonazePAM (KLONOPIN) 0.5 MG Tab Take 1 tablet by mouth 2 times a day as needed (anxiety) for up to 30 days. 30 tablet 2   • topiramate (TOPAMAX) 25 MG Tab Take 50 mg by mouth every bedtime.     • levothyroxine (SYNTHROID) 112 MCG Tab Take 1 Tab by mouth Every morning on an empty stomach. 90 Tab 3   • zolmitriptan (ZOMIG) 5 MG tablet TAKE ONE TABLET BY MOUTH ONCE AS NEEDED FOR MIGRAINE, MAY REPEAT IN 2 HOURS. MAX DOSE IS 2 TABLETS IN 24 HOURS 10 Tab 3     No current facility-administered medications for this visit.       Review Of Systems:    Constitutional - Positive for fatigue  Psychiatric - Positive for depression, anxiety    Physical Examination:  Vital signs: Ht 1.753 m (5' 9\")   Wt 70.8 kg (156 lb)   LMP  (LMP Unknown)   Breastfeeding No   BMI 23.04 kg/m²     Musculoskeletal: No " "abnormal movements.     Mental Status Evaluation:   General: Middle aged white female, dressed in casual attire, good grooming and hygiene, in no apparent distress, calm and cooperative, good eye contact, no psychomotor agitation or retardation  Orientation: Alert and oriented to person, place and time  Recent and remote memory: Grossly intact  Attention span and concentration: Grossly intact  Speech: Spontaneous, normal rate, rhythm and tone  Thought Process: Linear, logical and goal directed  Thought Content: Denies suicidal or homicidal ideations, intent or plan  Perception: Denies auditory or visual hallucinations. No delusions noted  Associations: Intact  Language: Appropriate  Fund of knowledge and vocabulary: Grossly adequate  Mood: \"okay\"  Affect: Euthymic, mood congruent  Insight: Good  Judgment: Good    Depression screening:  Depression Screen (PHQ-2/PHQ-9) 12/11/2018 7/3/2019 8/12/2020   PHQ-2 Total Score - - -   PHQ-2 Total Score 6 4 5   PHQ-9 Total Score - - -   PHQ-9 Total Score 16 13 17     Interpretation of PHQ-9 Total Score   Score Severity   1-4 No Depression   5-9 Mild Depression   10-14 Moderate Depression   15-19 Moderately Severe Depression   20-27 Severe Depression    Medical Records/Labs/Diagnostic Tests Reviewed:  [place] PDMP records - appropriate refills, no abuse suspected       Impression:  1.  Major depressive disorder, recurrent, in partial remission - improving   2.  Binge eating disorder, moderate - improving   3.  Generalized anxiety disorder - improving     Plan:  1.  Prozac discontinued by patient about a month ago and is doing well without it.  2.  Decrease Wellbutrin  mg in the morning for anxiety and depression given stable symptoms  3.  Continue Klonopin 0.5 mg twice daily as needed for anxiety.  E prescribed for 3 months  4.  Discontinue Trazodone since she is sleeping better and no longer needing it  5  Continue Vyvanse 60 mg in the morning for binge eating disorder.  " E-prescribed x 3 months.    Return to clinic in 3 months or sooner if symptoms worsen    The proposed treatment plan was discussed with the patient who was provided the opportunity to ask questions and make suggestions regarding alternative treatment. Patient verbalized understanding and expressed agreement with the plan.     Opal Garcia M.D.  04/01/21    This note was created using voice recognition software (Dragon). The accuracy of the dictation is limited by the abilities of the software. I have reviewed the note prior to signing, however some errors in grammar and context are still possible. If you have any questions related to this note please do not hesitate to contact our office.

## 2021-04-08 ENCOUNTER — EH NON-PROVIDER (OUTPATIENT)
Dept: OCCUPATIONAL MEDICINE | Facility: CLINIC | Age: 61
End: 2021-04-08

## 2021-05-19 ENCOUNTER — PATIENT MESSAGE (OUTPATIENT)
Dept: BEHAVIORAL HEALTH | Facility: CLINIC | Age: 61
End: 2021-05-19

## 2021-05-19 DIAGNOSIS — F33.41 MDD (MAJOR DEPRESSIVE DISORDER), RECURRENT, IN PARTIAL REMISSION (HCC): ICD-10-CM

## 2021-05-19 DIAGNOSIS — F41.1 GENERALIZED ANXIETY DISORDER: ICD-10-CM

## 2021-05-19 RX ORDER — BUPROPION HYDROCHLORIDE 300 MG/1
300 TABLET ORAL EVERY MORNING
Qty: 90 TABLET | Refills: 0 | Status: SHIPPED | OUTPATIENT
Start: 2021-06-02 | End: 2021-07-01 | Stop reason: SDUPTHER

## 2021-06-01 ENCOUNTER — PATIENT MESSAGE (OUTPATIENT)
Dept: BEHAVIORAL HEALTH | Facility: CLINIC | Age: 61
End: 2021-06-01

## 2021-06-01 DIAGNOSIS — F50.81 BINGE-EATING DISORDER, MODERATE: ICD-10-CM

## 2021-06-01 RX ORDER — LISDEXAMFETAMINE DIMESYLATE CAPSULES 60 MG/1
60 CAPSULE ORAL EVERY MORNING
Qty: 30 CAPSULE | Refills: 0 | Status: SHIPPED | OUTPATIENT
Start: 2021-06-01 | End: 2021-07-01

## 2021-07-01 ENCOUNTER — TELEMEDICINE (OUTPATIENT)
Dept: BEHAVIORAL HEALTH | Facility: CLINIC | Age: 61
End: 2021-07-01
Payer: COMMERCIAL

## 2021-07-01 VITALS — WEIGHT: 135 LBS | BODY MASS INDEX: 19.99 KG/M2 | HEIGHT: 69 IN

## 2021-07-01 DIAGNOSIS — F50.81 BINGE-EATING DISORDER, MODERATE: ICD-10-CM

## 2021-07-01 DIAGNOSIS — F33.41 MDD (MAJOR DEPRESSIVE DISORDER), RECURRENT, IN PARTIAL REMISSION (HCC): ICD-10-CM

## 2021-07-01 DIAGNOSIS — F41.1 GENERALIZED ANXIETY DISORDER: ICD-10-CM

## 2021-07-01 PROCEDURE — 99214 OFFICE O/P EST MOD 30 MIN: CPT | Mod: 95 | Performed by: PSYCHIATRY & NEUROLOGY

## 2021-07-01 PROCEDURE — 90833 PSYTX W PT W E/M 30 MIN: CPT | Mod: 95 | Performed by: PSYCHIATRY & NEUROLOGY

## 2021-07-01 RX ORDER — CLONAZEPAM 0.5 MG/1
0.5 TABLET ORAL
Qty: 30 TABLET | Refills: 2 | Status: SHIPPED | OUTPATIENT
Start: 2021-07-01 | End: 2021-07-31

## 2021-07-01 RX ORDER — LISDEXAMFETAMINE DIMESYLATE CAPSULES 60 MG/1
60 CAPSULE ORAL EVERY MORNING
Qty: 30 CAPSULE | Refills: 0 | Status: SHIPPED | OUTPATIENT
Start: 2021-07-01 | End: 2021-07-31

## 2021-07-01 RX ORDER — LISDEXAMFETAMINE DIMESYLATE CAPSULES 60 MG/1
60 CAPSULE ORAL EVERY MORNING
Qty: 30 CAPSULE | Refills: 0 | Status: SHIPPED | OUTPATIENT
Start: 2021-08-28 | End: 2021-09-27

## 2021-07-01 RX ORDER — BUPROPION HYDROCHLORIDE 300 MG/1
300 TABLET ORAL EVERY MORNING
Qty: 90 TABLET | Refills: 0 | Status: SHIPPED | OUTPATIENT
Start: 2021-07-01 | End: 2021-09-30 | Stop reason: SDUPTHER

## 2021-07-01 RX ORDER — LISDEXAMFETAMINE DIMESYLATE CAPSULES 60 MG/1
60 CAPSULE ORAL EVERY MORNING
Qty: 30 CAPSULE | Refills: 0 | Status: SHIPPED | OUTPATIENT
Start: 2021-07-30 | End: 2021-08-29

## 2021-07-01 ASSESSMENT — FIBROSIS 4 INDEX: FIB4 SCORE: 1.13

## 2021-07-01 NOTE — PROGRESS NOTES
PSYCHIATRY VIRTUAL VISIT FOLLOW-UP NOTE      Chief Complaint   Patient presents with   • Follow-Up     depression, anxiety, eating disorder       This evaluation was conducted via Zoom using secure and encrypted videoconferencing technology. The patient was in a private location in the [facility].    The patient's identity was confirmed and verbal consent was obtained for this virtual visit.    History Of Present Illness:  Sammi Alberto is a 61 y.o. female with major depressive disorder, generalized anxiety disorder, binge eating disorder, hypothyroidism, s/p lap band surgery in 2010, migraines, hepatitis C, parathyroidectomy, left ACL and meniscal repair comes in today for follow up, was last seen 3 months ago.  She has been doing good in regards to her depression and anxiety since her last visit with me.  She has been really busy with 2 full-time jobs which is definitely causing some burnout.  She is thinking about quitting one of the jobs in August and continuing with her remote job.  She has been staying active is in touch with her friends.  She was recently contacted by her ex-boyfriend and she handled the situation really well.  She is noticing a lot of anger towards her ex-boyfriend and she does not want to be in contact with him anymore.  She has been on medications.  She has been doing really good and because she has been so busy    Social History:   She is single, ended 4-year long relationship in 9/2017,  and  x 1, 2 sons from her marriage, close with both kids and siblings - 3 sisters and brother, lives in [place], works 2 part-time jobs.    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 30 capsule 0   • buPROPion (WELLBUTRIN XL) 300 MG XL tablet Take 1 tablet by mouth every morning. 90 tablet 0   • topiramate (TOPAMAX) 25 MG Tab Take 50 mg by mouth every bedtime.     • levothyroxine (SYNTHROID) 112 MCG Tab Take 1 Tab by mouth Every morning on an empty stomach. 90 Tab 3   • zolmitriptan (ZOMIG) 5 MG tablet TAKE ONE TABLET BY MOUTH ONCE AS NEEDED FOR MIGRAINE, MAY REPEAT IN 2 HOURS. MAX DOSE IS 2 TABLETS IN 24 HOURS 10 Tab 3     No current facility-administered medications for this visit.       Review Of Systems:    Constitutional - Positive for fatigue  Psychiatric - Positive for infrequent depression, anxiety    Physical Examination:  Vital signs: Ht 1.753 m (5' 9\")   Wt 61.2 kg (135 lb)   LMP  (LMP Unknown)   BMI 19.94 kg/m²     Musculoskeletal: No abnormal movements.     Mental Status Evaluation:   General: Middle aged white female, dressed in casual attire, good grooming and hygiene, in no apparent distress, calm and cooperative, good eye contact, no psychomotor agitation or retardation  Orientation: Alert and oriented to person, place and time  Recent and remote memory: Grossly intact  Attention span and concentration: Grossly intact  Speech: Spontaneous, normal rate, rhythm and tone  Thought Process: Linear, logical and goal directed  Thought Content: Denies suicidal or homicidal ideations, intent or plan  Perception: Denies auditory or visual hallucinations. No delusions noted  Associations: Intact  Language: Appropriate  Fund of knowledge and vocabulary: Grossly adequate  Mood: \"pretty good\"  Affect: Euthymic, mood congruent  Insight: Good  Judgment: Good    Depression screening:  Depression Screen (PHQ-2/PHQ-9) 12/11/2018 7/3/2019 " 8/12/2020   PHQ-2 Total Score - - -   PHQ-2 Total Score 6 4 5   PHQ-9 Total Score - - -   PHQ-9 Total Score 16 13 17     Interpretation of PHQ-9 Total Score   Score Severity   1-4 No Depression   5-9 Mild Depression   10-14 Moderate Depression   15-19 Moderately Severe Depression   20-27 Severe Depression    Medical Records/Labs/Diagnostic Tests Reviewed:  [place] PDMP records - appropriate refills, no abuse suspected       Impression:  1.  Major depressive disorder, recurrent, in partial remission - stable  2.  Binge eating disorder, moderate - stable  3.  Generalized anxiety disorder - stable    Plan:  1.  Continue Wellbutrin  mg in the morning for anxiety and depression   2.  Continue Klonopin 0.5 mg daily as needed for anxiety.  E prescribed x 3 months.  3.  Continue Vyvanse 60 mg in the morning for binge eating disorder.  E-prescribed x 3 months.  4.  Provided supportive psychotherapy (> 16 minutes) regards to depression and anxiety.  Encouraged her self-care.  Provided encouragement and how she handled the situation with her ex-boyfriend today.  Discussed how not meaning that    Return to clinic in 3 months or sooner if symptoms worsen    The proposed treatment plan was discussed with the patient who was provided the opportunity to ask questions and make suggestions regarding alternative treatment. Patient verbalized understanding and expressed agreement with the plan.     Opal Garcia M.D.  07/01/21    This note was created using voice recognition software (Dragon). The accuracy of the dictation is limited by the abilities of the software. I have reviewed the note prior to signing, however some errors in grammar and context are still possible. If you have any questions related to this note please do not hesitate to contact our office.

## 2021-08-12 DIAGNOSIS — F41.1 GENERALIZED ANXIETY DISORDER: ICD-10-CM

## 2021-08-12 DIAGNOSIS — F33.41 MDD (MAJOR DEPRESSIVE DISORDER), RECURRENT, IN PARTIAL REMISSION (HCC): ICD-10-CM

## 2021-08-12 RX ORDER — BUPROPION HYDROCHLORIDE 150 MG/1
150 TABLET ORAL EVERY MORNING
Qty: 90 TABLET | Refills: 0 | Status: SHIPPED | OUTPATIENT
Start: 2021-08-12 | End: 2021-09-30 | Stop reason: SDUPTHER

## 2021-08-20 ENCOUNTER — PATIENT MESSAGE (OUTPATIENT)
Dept: MEDICAL GROUP | Facility: MEDICAL CENTER | Age: 61
End: 2021-08-20

## 2021-08-20 DIAGNOSIS — G43.009 MIGRAINE WITHOUT AURA AND WITHOUT STATUS MIGRAINOSUS, NOT INTRACTABLE: ICD-10-CM

## 2021-08-20 DIAGNOSIS — E03.9 HYPOTHYROIDISM, UNSPECIFIED TYPE: ICD-10-CM

## 2021-08-23 RX ORDER — LEVOTHYROXINE SODIUM 112 UG/1
112 TABLET ORAL
Qty: 90 TABLET | Refills: 3 | Status: SHIPPED | OUTPATIENT
Start: 2021-08-23 | End: 2022-07-28 | Stop reason: SDUPTHER

## 2021-08-31 ENCOUNTER — OFFICE VISIT (OUTPATIENT)
Dept: MEDICAL GROUP | Facility: MEDICAL CENTER | Age: 61
End: 2021-08-31
Payer: COMMERCIAL

## 2021-08-31 VITALS
SYSTOLIC BLOOD PRESSURE: 100 MMHG | HEIGHT: 70 IN | BODY MASS INDEX: 21.33 KG/M2 | WEIGHT: 149 LBS | TEMPERATURE: 96.9 F | OXYGEN SATURATION: 100 % | HEART RATE: 78 BPM | RESPIRATION RATE: 16 BRPM | DIASTOLIC BLOOD PRESSURE: 40 MMHG

## 2021-08-31 DIAGNOSIS — Z12.31 SCREENING MAMMOGRAM, ENCOUNTER FOR: ICD-10-CM

## 2021-08-31 DIAGNOSIS — B00.1 COLD SORE: ICD-10-CM

## 2021-08-31 DIAGNOSIS — E03.9 HYPOTHYROIDISM, UNSPECIFIED TYPE: ICD-10-CM

## 2021-08-31 DIAGNOSIS — E78.5 HYPERLIPIDEMIA, UNSPECIFIED HYPERLIPIDEMIA TYPE: ICD-10-CM

## 2021-08-31 DIAGNOSIS — F50.81 BINGE-EATING DISORDER, MODERATE: ICD-10-CM

## 2021-08-31 DIAGNOSIS — Z00.00 HEALTHCARE MAINTENANCE: ICD-10-CM

## 2021-08-31 DIAGNOSIS — N87.0 CIN I (CERVICAL INTRAEPITHELIAL NEOPLASIA I): ICD-10-CM

## 2021-08-31 DIAGNOSIS — L08.9 SKIN LESION, INFECTED: ICD-10-CM

## 2021-08-31 DIAGNOSIS — F41.1 GENERALIZED ANXIETY DISORDER: ICD-10-CM

## 2021-08-31 DIAGNOSIS — F33.41 MDD (MAJOR DEPRESSIVE DISORDER), RECURRENT, IN PARTIAL REMISSION (HCC): ICD-10-CM

## 2021-08-31 DIAGNOSIS — G43.009 MIGRAINE WITHOUT AURA AND WITHOUT STATUS MIGRAINOSUS, NOT INTRACTABLE: ICD-10-CM

## 2021-08-31 PROCEDURE — 99396 PREV VISIT EST AGE 40-64: CPT | Performed by: FAMILY MEDICINE

## 2021-08-31 RX ORDER — VALACYCLOVIR HYDROCHLORIDE 1 G/1
1000 TABLET, FILM COATED ORAL 2 TIMES DAILY
Qty: 20 TABLET | Refills: 0 | Status: SHIPPED | OUTPATIENT
Start: 2021-08-31 | End: 2022-04-08

## 2021-08-31 RX ORDER — DOXYCYCLINE HYCLATE 100 MG
100 TABLET ORAL 2 TIMES DAILY
Qty: 14 TABLET | Refills: 0 | Status: SHIPPED | OUTPATIENT
Start: 2021-08-31 | End: 2021-09-07

## 2021-08-31 RX ORDER — ZOLMITRIPTAN 5 MG/1
TABLET, FILM COATED ORAL
Qty: 10 TABLET | Refills: 3 | Status: SHIPPED | OUTPATIENT
Start: 2021-08-31 | End: 2022-07-28 | Stop reason: SDUPTHER

## 2021-08-31 RX ORDER — TOPIRAMATE 25 MG/1
25 TABLET ORAL 2 TIMES DAILY
Qty: 180 TABLET | Refills: 2 | Status: SHIPPED | OUTPATIENT
Start: 2021-08-31 | End: 2022-06-13

## 2021-08-31 ASSESSMENT — PATIENT HEALTH QUESTIONNAIRE - PHQ9
5. POOR APPETITE OR OVEREATING: NOT AT ALL
8. MOVING OR SPEAKING SO SLOWLY THAT OTHER PEOPLE COULD HAVE NOTICED. OR THE OPPOSITE, BEING SO FIGETY OR RESTLESS THAT YOU HAVE BEEN MOVING AROUND A LOT MORE THAN USUAL: NOT AT ALL
6. FEELING BAD ABOUT YOURSELF - OR THAT YOU ARE A FAILURE OR HAVE LET YOURSELF OR YOUR FAMILY DOWN: NOT AL ALL
7. TROUBLE CONCENTRATING ON THINGS, SUCH AS READING THE NEWSPAPER OR WATCHING TELEVISION: NOT AT ALL
2. FEELING DOWN, DEPRESSED, IRRITABLE, OR HOPELESS: NOT AT ALL
3. TROUBLE FALLING OR STAYING ASLEEP OR SLEEPING TOO MUCH: NOT AT ALL
SUM OF ALL RESPONSES TO PHQ QUESTIONS 1-9: 0
1. LITTLE INTEREST OR PLEASURE IN DOING THINGS: NOT AT ALL
SUM OF ALL RESPONSES TO PHQ9 QUESTIONS 1 AND 2: 0
4. FEELING TIRED OR HAVING LITTLE ENERGY: NOT AT ALL
9. THOUGHTS THAT YOU WOULD BE BETTER OFF DEAD, OR OF HURTING YOURSELF: NOT AT ALL

## 2021-08-31 ASSESSMENT — FIBROSIS 4 INDEX: FIB4 SCORE: 1.13

## 2021-09-01 NOTE — PROGRESS NOTES
CC: Preventive annual exam    HPI:   Sammi presents today to discuss the following:    MDD (major depressive disorder), recurrent, in partial remission (HCC)/ EDVIN (generalized anxiety disorder)  Patient has been doing fine on Wellbutrin.  Symptoms has improved.  Denies any suicidal ideation.  She has been following up with psychiatrist.    Binge-eating disorder, moderate  Patient symptoms as improved on current regimen.  She has been following up with psychiatrist.    Hypothyroidism, unspecified type  She has been tolerating Levothyroxine, no palpitation, no cold or heat intolerance, currently on levothyroxine 112 mcg daily    Hyperlipidemia, unspecified hyperlipidemia type  Last lipid panel was abnormal as follows:   Ref Range & Units 10 mo ago   Cholesterol,Tot 100 - 199 mg/dL 234 High     Triglycerides 0 - 149 mg/dL 56    HDL >=40 mg/dL 87    LDL <100 mg/dL 136 High       Patient reluctant to take any medication.  No history of diabetes, CAD, or stroke    LICHA I (cervical intraepithelial neoplasia I)  Patient has history of LICHA 1, s/p partial cervical removal, will refer to gynecology for an evaluation and possibly Pap smear    Migraine without aura and without status migrainosus, not intractable  Symptoms has been stable.  Currently asymptomatic.  She has been doing fine on Zomig and Topamax.  Needs medication refill.  Has been following up with neurology Dr. Karimi    Cold sornew  Currently asymptomatic.  Patient uss Valtrex as needed and she got the cold sore.  Needs a refill    Skin lesion, infected  Patient has an infected ulcer on the left ear pinna that show up without trigger factor, she has been feeling some pain in the area, but she cannot see it.  Stating that there is no history of trauma to the area    Due for mammogram, and Pap smear        Patient Active Problem List    Diagnosis Date Noted   • Chronic insomnia 03/26/2020   • MDD (major depressive disorder), recurrent, in partial remission (HCC)  "09/15/2017   • EDVIN (generalized anxiety disorder) 08/11/2017   • Binge-eating disorder, moderate 04/07/2017   • Hypothyroidism 11/01/2016   • Acute meniscal tear of left knee 10/15/2016   • Rupture of anterior cruciate ligament of left knee 10/15/2016       Current Outpatient Medications   Medication Sig Dispense Refill   • zolmitriptan (ZOMIG) 5 MG tablet TAKE ONE TABLET BY MOUTH ONCE AS NEEDED FOR MIGRAINE, MAY REPEAT IN 2 HOURS. MAX DOSE IS 2 TABLETS IN 24 HOURS 10 Tablet 3   • topiramate (TOPAMAX) 25 MG Tab Take 1 Tablet by mouth 2 times a day. 180 Tablet 2   • valacyclovir (VALTREX) 1 GM Tab Take 1 Tablet by mouth 2 times a day. 20 Tablet 0   • doxycycline (VIBRAMYCIN) 100 MG Tab Take 1 Tablet by mouth 2 times a day for 7 days. 14 Tablet 0   • levothyroxine (SYNTHROID) 112 MCG Tab Take 1 Tablet by mouth every morning on an empty stomach. 90 Tablet 3   • buPROPion (WELLBUTRIN XL) 150 MG XL tablet Take 1 Tablet by mouth every morning. Take in addition to Bupropion  mg for a total daily dose of 450 mg. 90 Tablet 0   • Lisdexamfetamine Dimesylate 60 MG Cap Take 1 capsule by mouth every morning for 30 days. 30 capsule 0   • buPROPion (WELLBUTRIN XL) 300 MG XL tablet Take 1 tablet by mouth every morning. 90 tablet 0     No current facility-administered medications for this visit.         Allergies as of 08/31/2021 - Reviewed 08/31/2021   Allergen Reaction Noted   • Bactrim ds  04/29/2019   • Codeine Rash 10/18/2016   • Sulfa drugs Unspecified 08/05/2020        ROS: Denies any chest pain, Shortness of breath, Changes bowel or bladder, Lower extremity edema.    Physical Exam:  /40 (BP Location: Right arm, Patient Position: Sitting, BP Cuff Size: Adult)   Pulse 78   Temp 36.1 °C (96.9 °F) (Temporal)   Resp 16   Ht 1.778 m (5' 10\") Comment: patient notified  Wt 67.6 kg (149 lb)   LMP  (LMP Unknown)   SpO2 100%   BMI 21.38 kg/m²   Gen.: Well-developed, well-nourished, no apparent distress,pleasant and " cooperative with the examination  Skin:  Warm and dry with good turgor. No rashes or suspicious lesions in visible areas  HEENT:Sinuses nontender with palpation, TMs clear, nares patent with pink mucosa and clear rhinorrhea,no septal deviation ,polyps or lesions. lips without lesions, oropharynx clear.  Neck: Trachea midline,no masses or adenopathy. No JVD.  Cor: Regular rate and rhythm without murmur, gallop or rub.  Lungs: Respirations unlabored.Clear to auscultation with equal breath sounds bilaterally. No wheezes, rhonchi.  Extremities: No cyanosis, clubbing or edema.        Assessment and Plan.   61 y.o. female     1. MDD (major depressive disorder), recurrent, in partial remission (HCC)  Stable.  Continue on Wellbutrin  Continue follow-up with psychiatry  - CBC WITH DIFFERENTIAL; Future  - Comp Metabolic Panel; Future    2. EDVIN (generalized anxiety disorder)  Stable.  Continue on Wellbutrin  Continue follow-up with psychiatrist    3. Binge-eating disorder, moderate  Improved.  Continue current regimen  Continue follow-up with psychiatry    4. Hypothyroidism, unspecified type  He has been tolerating Levothyroxine, no palpitation, no cold or heat intolerance  Continue on levothyroxine 112 mcg daily    - CBC WITH DIFFERENTIAL; Future  - Comp Metabolic Panel; Future  - TSH+FREE T4    5. Hyperlipidemia, unspecified hyperlipidemia type  Last lipid panel was abnormal as mentioned above in  HPI  Patient reluctant to take any medication.  We will repeat lipid panel.  - Lipid Profile; Future    6. Healthcare maintenance  Due for mammogram, and Pap smear    7. Screening mammogram, encounter for    - MA-SCREENING MAMMO BILAT W/CAD; Future    8. LICHA I (cervical intraepithelial neoplasia I)  Patient has history of LICHA 1, s/p partial cervical removal, will refer to gynecology for an evaluation and possibly Pap smear  - REFERRAL TO GYNECOLOGY    9. Migraine without aura and without status migrainosus, not  intractable  Stable.  Continue on Zomig and Topamax    - zolmitriptan (ZOMIG) 5 MG tablet; TAKE ONE TABLET BY MOUTH ONCE AS NEEDED FOR MIGRAINE, MAY REPEAT IN 2 HOURS. MAX DOSE IS 2 TABLETS IN 24 HOURS  Dispense: 10 Tablet; Refill: 3  - topiramate (TOPAMAX) 25 MG Tab; Take 1 Tablet by mouth 2 times a day.  Dispense: 180 Tablet; Refill: 2    10. Cold sore  Currently asymptomatic.  Use Valtrex as needed and she got the cold sore.    - valacyclovir (VALTREX) 1 GM Tab; Take 1 Tablet by mouth 2 times a day.  Dispense: 20 Tablet; Refill: 0    11. Skin lesion, infected  Patient has an infected ulcer that show up without trigger factor, rule out skin cancer.  Will treat the skin infection with doxycycline.    - doxycycline (VIBRAMYCIN) 100 MG Tab; Take 1 Tablet by mouth 2 times a day for 7 days.  Dispense: 14 Tablet; Refill: 0  - REFERRAL TO DERMATOLOGY

## 2021-09-09 ENCOUNTER — OFFICE VISIT (OUTPATIENT)
Dept: DERMATOLOGY | Facility: IMAGING CENTER | Age: 61
End: 2021-09-09
Payer: COMMERCIAL

## 2021-09-09 DIAGNOSIS — L81.4 LENTIGO: ICD-10-CM

## 2021-09-09 DIAGNOSIS — L98.491 SKIN ULCER, LIMITED TO BREAKDOWN OF SKIN (HCC): ICD-10-CM

## 2021-09-09 DIAGNOSIS — L98.8 FACIAL RHYTIDS: ICD-10-CM

## 2021-09-09 PROCEDURE — 99203 OFFICE O/P NEW LOW 30 MIN: CPT | Performed by: DERMATOLOGY

## 2021-09-09 RX ORDER — CLONAZEPAM 0.5 MG/1
TABLET ORAL
COMMUNITY
Start: 2021-08-30 | End: 2021-11-10 | Stop reason: SDUPTHER

## 2021-09-09 NOTE — PROGRESS NOTES
"CC: skin lesion    Subjective: NEW patient here for ear spot. Reports onset without trauma but has now been picking at site for 2 weeks with worsening noted. May have had infection locally, trx with ABX.     HPI/location: Lt ear \"ulcer\"   Time present: 2 weeks   Painful lesion: Yes  Itching lesion: No  Enlarging lesion: No  Anything make it better or worse? Abx: doxy helped last dose yesterday morning 1 week course     History of skin cancer: No  History of precancers/actinic keratoses: No  History of biopsies:No  History of blistering/severe sunburns:Yes, Details: multiple, shoulders  Family history of skin cancer:No  Family history of atypical moles:No    ROS: no fevers/chills. No itch.  No cough  DermPMH: no skin cancer/melanoma  No problem-specific Assessment & Plan notes found for this encounter.    Relevant PMH: hx depression/substance use  Social: NS    PE: Gen:WDWN female in NAD. Skin: approx 0.5 X 0.8cm ulcer, clean base without rolled borders on left ear triangular fossae.; crust easily removed. No surrounding erythema/drainage    A/P: likely traumatic ulcer: secondary to picking/self-inflicted  -reviewed dx/tx  -stop picking  -vaseline/Bandage X 3 weeks and RTC for eval, possible skin bx    Rhytids/lentigos:  -consider cosmetics PRN    I have reviewed medications relevant to my specialty.            "

## 2021-09-30 ENCOUNTER — TELEMEDICINE (OUTPATIENT)
Dept: BEHAVIORAL HEALTH | Facility: CLINIC | Age: 61
End: 2021-09-30
Payer: COMMERCIAL

## 2021-09-30 VITALS — BODY MASS INDEX: 22 KG/M2 | HEIGHT: 69 IN

## 2021-09-30 DIAGNOSIS — F50.81 BINGE-EATING DISORDER, IN PARTIAL REMISSION, MODERATE: ICD-10-CM

## 2021-09-30 DIAGNOSIS — F33.41 MDD (MAJOR DEPRESSIVE DISORDER), RECURRENT, IN PARTIAL REMISSION (HCC): ICD-10-CM

## 2021-09-30 DIAGNOSIS — F41.1 GENERALIZED ANXIETY DISORDER: ICD-10-CM

## 2021-09-30 PROCEDURE — 99214 OFFICE O/P EST MOD 30 MIN: CPT | Mod: 95 | Performed by: PSYCHIATRY & NEUROLOGY

## 2021-09-30 PROCEDURE — 90833 PSYTX W PT W E/M 30 MIN: CPT | Mod: 95 | Performed by: PSYCHIATRY & NEUROLOGY

## 2021-09-30 RX ORDER — LISDEXAMFETAMINE DIMESYLATE CAPSULES 60 MG/1
60 CAPSULE ORAL EVERY MORNING
Qty: 30 CAPSULE | Refills: 0 | Status: SHIPPED | OUTPATIENT
Start: 2021-10-08 | End: 2021-11-07

## 2021-09-30 RX ORDER — BUPROPION HYDROCHLORIDE 150 MG/1
150 TABLET ORAL EVERY MORNING
Qty: 90 TABLET | Refills: 0 | Status: SHIPPED | OUTPATIENT
Start: 2021-09-30 | End: 2021-12-20 | Stop reason: SDUPTHER

## 2021-09-30 RX ORDER — BUPROPION HYDROCHLORIDE 300 MG/1
300 TABLET ORAL EVERY MORNING
Qty: 90 TABLET | Refills: 0 | Status: SHIPPED | OUTPATIENT
Start: 2021-09-30 | End: 2021-12-20 | Stop reason: SDUPTHER

## 2021-09-30 RX ORDER — LISDEXAMFETAMINE DIMESYLATE CAPSULES 60 MG/1
60 CAPSULE ORAL EVERY MORNING
Qty: 30 CAPSULE | Refills: 0 | Status: SHIPPED | OUTPATIENT
Start: 2021-12-05 | End: 2022-01-04

## 2021-09-30 RX ORDER — LISDEXAMFETAMINE DIMESYLATE CAPSULES 60 MG/1
60 CAPSULE ORAL EVERY MORNING
Qty: 30 CAPSULE | Refills: 0 | Status: SHIPPED | OUTPATIENT
Start: 2021-11-06 | End: 2021-12-06

## 2021-09-30 NOTE — PROGRESS NOTES
PSYCHIATRY VIRTUAL VISIT FOLLOW-UP NOTE      Chief Complaint   Patient presents with   • Follow-Up     depression, anxiety       This evaluation was conducted via Zoom using secure and encrypted videoconferencing technology. The patient was in a private location in the [facility].    The patient's identity was confirmed and verbal consent was obtained for this virtual visit.    History Of Present Illness:  Sammi Alberto is a 61 y.o. female with major depressive disorder, generalized anxiety disorder, binge eating disorder, hypothyroidism, s/p lap band surgery in 2010, migraines, hepatitis C, parathyroidectomy, left ACL and meniscal repair comes in today for follow up, was last seen 3 months ago.  She has been doing all right in regards to her anxiety and depression since her last appointment with me.  She was noticing some irritability and decided to go back on her previous Wellbutrin dose of 450 mg.  She is feeling better and irritability has resolved.  She has been really busy with 2 full-time jobs and a part-time job.  She has decided to quit her job at [employer] which will give her more time for self-care.  She has been talking to a friend that she has been on for decades and is planning a skiing trip with him.  She has been having a hard time going forward with skiing which she really enjoys as she has a lot of memories from her last relationship.  She denies any contact with her ex-boyfriend.  She mentions that her son's girlfriend has been living with her and that has worked really good for her.  She has been doing good in regards to her binge eating.  She is not eating excessive candy like before but is still not cooking for herself on a regular basis.  She has been taking Klonopin at bedtime to help with sleep.  She denies having thoughts of wanting to hurt herself.    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  Nicotine - Denies  Cannabis - Denies   Illicit drugs - Denies    Past Medication Trials:  Prozac 40 mg (effective), Effexor  mg (initally effective), Zoloft 200 mg (ineffective), Lexapro 20 mg (stopped after she had acute drop in platelet count while she was on Bactrim), Nortriptyline, Abilify 10 mg (s/e - \"made me mean\"), Trazodone 25-50 mg (effective)    Medications:  Current Outpatient Medications   Medication Sig Dispense Refill   • clonazePAM (KLONOPIN) 0.5 MG Tab TAKE 1 TABLET BY MOUTH 1 TIME A DAY AS NEEDED (ANXIETY) FOR UP TO 30 DAYS.[id number]     • zolmitriptan (ZOMIG) 5 MG tablet TAKE ONE TABLET BY MOUTH ONCE AS NEEDED FOR MIGRAINE, MAY REPEAT IN 2 HOURS. MAX DOSE IS 2 TABLETS IN 24 HOURS 10 Tablet 3   • topiramate (TOPAMAX) 25 MG Tab Take 1 Tablet by mouth 2 times a day. 180 Tablet 2   • valacyclovir (VALTREX) 1 GM Tab Take 1 Tablet by mouth 2 times a day. 20 Tablet 0   • levothyroxine (SYNTHROID) 112 MCG Tab Take 1 Tablet by mouth every morning on an empty stomach. 90 Tablet 3   • buPROPion (WELLBUTRIN XL) 150 MG XL tablet Take 1 Tablet by mouth every morning. Take in addition to Bupropion  mg for a total daily dose of 450 mg. 90 Tablet 0   • buPROPion (WELLBUTRIN XL) 300 MG XL tablet Take 1 tablet by mouth every morning. 90 tablet 0     No current facility-administered medications for this visit.       Review Of Systems:    Constitutional - Positive for fatigue  Psychiatric - Positive for infrequent depression, anxiety, sleep problems     Physical Examination:  Vital signs: Ht 1.753 m (5' 9\")   LMP  (LMP Unknown)   BMI 22.00 kg/m²     Musculoskeletal: No abnormal movements.     Mental Status Evaluation:   General: Middle aged white female, dressed in " "casual attire, good grooming and hygiene, in no apparent distress, calm and cooperative, good eye contact, no psychomotor agitation or retardation  Orientation: Alert and oriented to person, place and time  Recent and remote memory: Grossly intact  Attention span and concentration: Grossly intact  Speech: Spontaneous, normal rate, rhythm and tone  Thought Process: Linear, logical and goal directed  Thought Content: Denies suicidal or homicidal ideations, intent or plan  Perception: No delusions noted  Associations: Intact  Language: Appropriate  Fund of knowledge and vocabulary: Grossly adequate  Mood: \"good\"  Affect: Euthymic, mood congruent  Insight: Good  Judgment: Good    Depression screening:  Depression Screen (PHQ-2/PHQ-9) 7/3/2019 8/12/2020 8/31/2021   PHQ-2 Total Score - - -   PHQ-2 Total Score - - 0   PHQ-2 Total Score 4 5 -   PHQ-9 Total Score - - -   PHQ-9 Total Score - - 0   PHQ-9 Total Score 13 17 -     Interpretation of PHQ-9 Total Score   Score Severity   1-4 No Depression   5-9 Mild Depression   10-14 Moderate Depression   15-19 Moderately Severe Depression   20-27 Severe Depression    Medical Records/Labs/Diagnostic Tests Reviewed:  [place] PDMP records - appropriate refills, no abuse suspected       Impression:  1.  Major depressive disorder, recurrent, in partial remission - stable  2.  Binge eating disorder, moderate, in partial remission - stable  3.  Generalized anxiety disorder - stable    Plan:  1.  Continue Wellbutrin  mg in the morning for anxiety and depression   2.  Continue Klonopin 0.5 mg daily as needed for anxiety.  Not prescribed today  3.  Continue Vyvanse 60 mg in the morning for binge eating disorder.  E-prescribed x 3 months.  4.  Provided supportive psychotherapy (> 16 minutes).  Discussed her relationship it has held her back from moving forward.  Encouraged her to follow through on her plan of skiing with her close friend.  Advised self care.  5.  She is considering going " back into psychotherapy with Dr. Mckenna Jimenez, PhD    Return to clinic in 3 months or sooner if symptoms worsen    The proposed treatment plan was discussed with the patient who was provided the opportunity to ask questions and make suggestions regarding alternative treatment. Patient verbalized understanding and expressed agreement with the plan.     Opal Garcia M.D.  07/01/21    This note was created using voice recognition software (Dragon). The accuracy of the dictation is limited by the abilities of the software. I have reviewed the note prior to signing, however some errors in grammar and context are still possible. If you have any questions related to this note please do not hesitate to contact our office.

## 2021-11-10 DIAGNOSIS — F41.1 GAD (GENERALIZED ANXIETY DISORDER): ICD-10-CM

## 2021-11-10 RX ORDER — CLONAZEPAM 0.5 MG/1
0.5 TABLET ORAL
Qty: 30 TABLET | Refills: 1 | Status: SHIPPED | OUTPATIENT
Start: 2021-11-10 | End: 2021-12-10

## 2021-12-20 ENCOUNTER — TELEMEDICINE (OUTPATIENT)
Dept: BEHAVIORAL HEALTH | Facility: CLINIC | Age: 61
End: 2021-12-20
Payer: COMMERCIAL

## 2021-12-20 VITALS — BODY MASS INDEX: 22 KG/M2 | HEIGHT: 69 IN

## 2021-12-20 DIAGNOSIS — F41.1 GENERALIZED ANXIETY DISORDER: ICD-10-CM

## 2021-12-20 DIAGNOSIS — F33.41 MDD (MAJOR DEPRESSIVE DISORDER), RECURRENT, IN PARTIAL REMISSION (HCC): ICD-10-CM

## 2021-12-20 DIAGNOSIS — F50.81 BINGE-EATING DISORDER, IN PARTIAL REMISSION, MODERATE: ICD-10-CM

## 2021-12-20 DIAGNOSIS — F51.04 CHRONIC INSOMNIA: ICD-10-CM

## 2021-12-20 PROCEDURE — 99214 OFFICE O/P EST MOD 30 MIN: CPT | Mod: 95 | Performed by: PSYCHIATRY & NEUROLOGY

## 2021-12-20 RX ORDER — BUPROPION HYDROCHLORIDE 300 MG/1
300 TABLET ORAL EVERY MORNING
Qty: 90 TABLET | Refills: 1 | Status: SHIPPED | OUTPATIENT
Start: 2021-12-20 | End: 2022-04-08 | Stop reason: SDUPTHER

## 2021-12-20 RX ORDER — LISDEXAMFETAMINE DIMESYLATE CAPSULES 60 MG/1
60 CAPSULE ORAL EVERY MORNING
Qty: 30 CAPSULE | Refills: 0 | Status: SHIPPED | OUTPATIENT
Start: 2022-02-09 | End: 2022-03-11

## 2021-12-20 RX ORDER — LISDEXAMFETAMINE DIMESYLATE CAPSULES 60 MG/1
60 CAPSULE ORAL EVERY MORNING
Qty: 30 CAPSULE | Refills: 0 | Status: SHIPPED | OUTPATIENT
Start: 2022-03-10 | End: 2022-04-09

## 2021-12-20 RX ORDER — CLONAZEPAM 0.5 MG/1
0.5 TABLET ORAL
Qty: 30 TABLET | Refills: 2 | Status: SHIPPED | OUTPATIENT
Start: 2022-01-11 | End: 2022-02-10

## 2021-12-20 RX ORDER — LISDEXAMFETAMINE DIMESYLATE CAPSULES 60 MG/1
60 CAPSULE ORAL EVERY MORNING
Qty: 30 CAPSULE | Refills: 0 | Status: SHIPPED | OUTPATIENT
Start: 2022-01-11 | End: 2022-02-10

## 2021-12-20 RX ORDER — BUPROPION HYDROCHLORIDE 150 MG/1
150 TABLET ORAL EVERY MORNING
Qty: 90 TABLET | Refills: 1 | Status: SHIPPED | OUTPATIENT
Start: 2021-12-20 | End: 2022-04-08 | Stop reason: SDUPTHER

## 2021-12-20 NOTE — PROGRESS NOTES
PSYCHIATRY VIRTUAL VISIT FOLLOW-UP NOTE      Chief Complaint   Patient presents with   • Follow-Up     depression, anxiety, eating disorder       This evaluation was conducted via Zoom using secure and encrypted videoconferencing technology. The patient was in a private location in the [facility].    The patient's identity was confirmed and verbal consent was obtained for this virtual visit.    History Of Present Illness:  Sammi Alberto is a 61 y.o. female with major depressive disorder, generalized anxiety disorder, binge eating disorder, hypothyroidism, s/p lap band surgery in 2010, migraines, hepatitis C, parathyroidectomy, left ACL and meniscal repair comes in today for follow up, was last seen about 3 months ago.  She has been doing all right in regards to her mental health since her last appointment with me.  She got back into her previous therapist Dr. Jimenez and has been seeing her on almost a weekly basis and that has been helpful.  She has been doing good at her workplace as well, is happy that she is not working a lot.  She is looking forward to the holidays, is taking a trip to [place] to visit family.  She has been doing good in regards to her eating as well.  She is trying to lose weight and recently had a tummy tuck procedure that went well.  She has been compliant with all of her medications.  She still has struggles with anxiety and insomnia.  She has been using Klonopin during daytime for overwhelming anxiety and using Melatonin for sleep.  She denies having thoughts of wanting to hurt herself.    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     • Lisdexamfetamine Dimesylate 60 MG Cap Take 1 Capsule by mouth every morning for 30 days. 30 Capsule 0   • buPROPion (WELLBUTRIN XL) 300 MG XL tablet Take 1 Tablet by mouth every morning. Take in addition to Bupropion  mg for a total daily dose of 450 mg. 90 Tablet 0   • buPROPion (WELLBUTRIN XL) 150 MG XL tablet Take 1 Tablet by mouth every morning. Take in addition to Bupropion  mg for a total daily dose of 450 mg. 90 Tablet 0   • zolmitriptan (ZOMIG) 5 MG tablet TAKE ONE TABLET BY MOUTH ONCE AS NEEDED FOR MIGRAINE, MAY REPEAT IN 2 HOURS. MAX DOSE IS 2 TABLETS IN 24 HOURS 10 Tablet 3   • topiramate (TOPAMAX) 25 MG Tab Take 1 Tablet by mouth 2 times a day. (Patient taking differently: Take 50 mg by mouth every day.) 180 Tablet 2   • valacyclovir (VALTREX) 1 GM Tab Take 1 Tablet by mouth 2 times a day. 20 Tablet 0   • levothyroxine (SYNTHROID) 112 MCG Tab Take 1 Tablet by mouth every morning on an empty stomach. 90 Tablet 3     No current facility-administered medications for this visit.       Review Of Systems:    Constitutional - Positive for fatigue  Psychiatric - Positive for infrequent depression, anxiety, sleep problems     Physical Examination:  Vital signs: Ht 1.753 m (5' 9\")   LMP  (LMP Unknown)   BMI 22.00 kg/m²     Musculoskeletal: No abnormal movements.     Mental Status Evaluation:   General: Middle aged white female, dressed in casual attire, good grooming and hygiene, in no " "apparent distress, calm and cooperative, good eye contact, no psychomotor agitation or retardation  Orientation: Alert and oriented to person, place and time  Recent and remote memory: Grossly intact  Attention span and concentration: Grossly intact  Speech: Spontaneous, normal rate, rhythm and tone  Thought Process: Linear, logical and goal directed  Thought Content: Denies suicidal or homicidal ideations, intent or plan  Perception: No delusions noted  Associations: Intact  Language: Appropriate  Fund of knowledge and vocabulary: Grossly adequate  Mood: \"good\"  Affect: Euthymic, mood congruent  Insight: Good  Judgment: Good    Depression screening:  Depression Screen (PHQ-2/PHQ-9) 7/3/2019 8/12/2020 8/31/2021   PHQ-2 Total Score - - -   PHQ-2 Total Score - - 0   PHQ-2 Total Score 4 5 -   PHQ-9 Total Score - - -   PHQ-9 Total Score - - 0   PHQ-9 Total Score 13 17 -     Interpretation of PHQ-9 Total Score   Score Severity   1-4 No Depression   5-9 Mild Depression   10-14 Moderate Depression   15-19 Moderately Severe Depression   20-27 Severe Depression    Medical Records/Labs/Diagnostic Tests Reviewed:  [place] PDMP records - appropriate refills, no abuse suspected       Impression:  1.  Major depressive disorder, recurrent, in partial remission - stable  2.  Binge eating disorder, moderate, in partial remission - stable  3.  Generalized anxiety disorder - stable  4.  Chronic insomnia - stable    Plan:  1.  Continue Wellbutrin  mg in the morning for anxiety and depression   2.  Continue Klonopin 0.5 mg daily as needed for anxiety.  E-prescribed x 3 months   3.  Continue Vyvanse 60 mg in the morning for binge eating disorder.  E-prescribed x 3 months.  I have encouraged her to think about lowering the dose of Vyvanse to 50 mg next spring or summer given stable depression and eating disorder symptoms.  Discussed Vyvanse and Wellbutrin combination could be causing anxiety as a side effect.  4.  Continue OTC Melatonin " 5 mg at bedtime as needed for sleep  5.  Continue individual psychotherapy with Dr. Mckenna Jimenez, PhD    Return to clinic in 4 months or sooner if symptoms worsen    The proposed treatment plan was discussed with the patient who was provided the opportunity to ask questions and make suggestions regarding alternative treatment. Patient verbalized understanding and expressed agreement with the plan.     Opal Garcia M.D.  12/20/21    This note was created using voice recognition software (Dragon). The accuracy of the dictation is limited by the abilities of the software. I have reviewed the note prior to signing, however some errors in grammar and context are still possible. If you have any questions related to this note please do not hesitate to contact our office.

## 2022-04-08 ENCOUNTER — TELEMEDICINE (OUTPATIENT)
Dept: BEHAVIORAL HEALTH | Facility: CLINIC | Age: 62
End: 2022-04-08
Payer: COMMERCIAL

## 2022-04-08 DIAGNOSIS — F41.1 GENERALIZED ANXIETY DISORDER: ICD-10-CM

## 2022-04-08 DIAGNOSIS — F33.41 MDD (MAJOR DEPRESSIVE DISORDER), RECURRENT, IN PARTIAL REMISSION (HCC): ICD-10-CM

## 2022-04-08 DIAGNOSIS — F50.81 BINGE-EATING DISORDER, IN PARTIAL REMISSION, MODERATE: ICD-10-CM

## 2022-04-08 DIAGNOSIS — F51.04 CHRONIC INSOMNIA: ICD-10-CM

## 2022-04-08 PROCEDURE — 90833 PSYTX W PT W E/M 30 MIN: CPT | Mod: 95 | Performed by: PSYCHIATRY & NEUROLOGY

## 2022-04-08 PROCEDURE — 99214 OFFICE O/P EST MOD 30 MIN: CPT | Mod: 95 | Performed by: PSYCHIATRY & NEUROLOGY

## 2022-04-08 RX ORDER — BUPROPION HYDROCHLORIDE 300 MG/1
300 TABLET ORAL EVERY MORNING
Qty: 90 TABLET | Refills: 0 | Status: SHIPPED | OUTPATIENT
Start: 2022-04-08 | End: 2022-06-07 | Stop reason: SDUPTHER

## 2022-04-08 RX ORDER — LISDEXAMFETAMINE DIMESYLATE CAPSULES 60 MG/1
60 CAPSULE ORAL EVERY MORNING
Qty: 30 CAPSULE | Refills: 0 | Status: SHIPPED | OUTPATIENT
Start: 2022-04-15 | End: 2022-05-15

## 2022-04-08 RX ORDER — BUPROPION HYDROCHLORIDE 150 MG/1
150 TABLET ORAL EVERY MORNING
Qty: 90 TABLET | Refills: 0 | Status: SHIPPED | OUTPATIENT
Start: 2022-04-08 | End: 2022-06-07 | Stop reason: SDUPTHER

## 2022-04-08 RX ORDER — LISDEXAMFETAMINE DIMESYLATE CAPSULES 60 MG/1
60 CAPSULE ORAL EVERY MORNING
Qty: 30 CAPSULE | Refills: 0 | Status: CANCELLED | OUTPATIENT
Start: 2022-06-12 | End: 2022-07-12

## 2022-04-08 RX ORDER — CLONAZEPAM 0.5 MG/1
TABLET ORAL
COMMUNITY
Start: 2022-03-17 | End: 2022-04-08

## 2022-04-08 RX ORDER — TRAZODONE HYDROCHLORIDE 50 MG/1
25-50 TABLET ORAL NIGHTLY PRN
Qty: 90 TABLET | Refills: 0 | Status: SHIPPED | OUTPATIENT
Start: 2022-04-08 | End: 2022-07-11

## 2022-04-08 RX ORDER — CLONAZEPAM 0.5 MG/1
0.5 TABLET ORAL
Qty: 30 TABLET | Refills: 1 | Status: SHIPPED | OUTPATIENT
Start: 2022-04-15 | End: 2022-05-15

## 2022-04-08 RX ORDER — LISDEXAMFETAMINE DIMESYLATE CAPSULES 60 MG/1
60 CAPSULE ORAL EVERY MORNING
Qty: 30 CAPSULE | Refills: 0 | Status: SHIPPED | OUTPATIENT
Start: 2022-05-14 | End: 2022-06-13

## 2022-04-08 NOTE — PROGRESS NOTES
PSYCHIATRY VIRTUAL VISIT FOLLOW-UP NOTE      Chief Complaint   Patient presents with   • Follow-Up     Depression, anxiety       This evaluation was conducted via Zoom using secure and encrypted videoconferencing technology.   The patient was in their home in the [facility].    The patient's identity was confirmed and verbal consent was obtained for this virtual visit.    History Of Present Illness:  Sammi Alberto is a 61 y.o. female with major depressive disorder, generalized anxiety disorder, binge eating disorder, hypothyroidism, s/p lap band surgery in 2010, migraines, hepatitis C, parathyroidectomy, left ACL and meniscal repair comes in today for follow up, was last seen over 3 months ago.  She has been noticing some increase struggles with depression for the last 2-1/2 weeks.  She denies any obvious stressors that she can think of.  However, she has been working with a CPA to get her taxes filed for the last 5 years.  She has not filed her taxes since 2017 and that might be contributing to her depression and insomnia.  She has been trying to stay active but when she feels depressed she tends to isolate and use food as a coping mechanism.  She has been compliant with all of her medications.  She has been using some leftover Trazodone and it has been helping her sleep.  She has noticed without it she is having a difficult time falling asleep.  She has been seeing a therapist on a weekly basis.  She continues to have a hard time getting over her last relationship that ended in 2017.  Denies having thoughts of wanting to hurt herself.    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, sober  "since the age of 16 years  Nicotine - Denies  Cannabis - Denies   Illicit drugs - Denies    Past Medication Trials:  Prozac 40 mg (effective), Effexor  mg (initally effective), Zoloft 200 mg (ineffective), Lexapro 20 mg (stopped after she had acute drop in platelet count while she was on Bactrim), Nortriptyline, Abilify 10 mg (s/e - \"made me mean\"), Trazodone 25-50 mg (effective), Lunesta (effective)    Medications:  Current Outpatient Medications   Medication Sig Dispense Refill   • Melatonin 5 MG Cap Take  by mouth.     • Lisdexamfetamine Dimesylate 60 MG Cap Take 1 Capsule by mouth every morning for 30 days. 30 Capsule 0   • buPROPion (WELLBUTRIN XL) 150 MG XL tablet Take 1 Tablet by mouth every morning. Take in addition to Bupropion  mg for a total daily dose of 450 mg. 90 Tablet 1   • buPROPion (WELLBUTRIN XL) 300 MG XL tablet Take 1 Tablet by mouth every morning. Take in addition to Bupropion  mg for a total daily dose of 450 mg. 90 Tablet 1   • zolmitriptan (ZOMIG) 5 MG tablet TAKE ONE TABLET BY MOUTH ONCE AS NEEDED FOR MIGRAINE, MAY REPEAT IN 2 HOURS. MAX DOSE IS 2 TABLETS IN 24 HOURS 10 Tablet 3   • topiramate (TOPAMAX) 25 MG Tab Take 1 Tablet by mouth 2 times a day. (Patient taking differently: Take 50 mg by mouth every day.) 180 Tablet 2   • valacyclovir (VALTREX) 1 GM Tab Take 1 Tablet by mouth 2 times a day. 20 Tablet 0   • levothyroxine (SYNTHROID) 112 MCG Tab Take 1 Tablet by mouth every morning on an empty stomach. 90 Tablet 3     No current facility-administered medications for this visit.       Review Of Systems:    Constitutional - Positive for fatigue  Psychiatric - Positive for infrequent depression, anxiety, sleep problems     Physical Examination:  Vital signs: LMP  (LMP Unknown)     Musculoskeletal: No abnormal movements.     Mental Status Evaluation:   General: Middle aged white female, dressed in casual attire, good grooming and hygiene, in no apparent distress, calm and " "cooperative, good eye contact, no psychomotor agitation or retardation  Orientation: Alert and oriented to person, place and time  Recent and remote memory: Grossly intact  Attention span and concentration: Grossly intact  Speech: Spontaneous, normal rate, rhythm and tone  Thought Process: Linear, logical and goal directed  Thought Content: Denies suicidal or homicidal ideations, intent or plan  Perception: No delusions noted  Associations: Intact  Language: Appropriate  Fund of knowledge and vocabulary: Grossly adequate  Mood: \"good\"  Affect: Euthymic, mood congruent  Insight: Good  Judgment: Good    Depression screening:  Depression Screen (PHQ-2/PHQ-9) 7/3/2019 8/12/2020 8/31/2021   PHQ-2 Total Score - - -   PHQ-2 Total Score - - 0   PHQ-2 Total Score 4 5 -   PHQ-9 Total Score - - -   PHQ-9 Total Score - - 0   PHQ-9 Total Score 13 17 -     Interpretation of PHQ-9 Total Score   Score Severity   1-4 No Depression   5-9 Mild Depression   10-14 Moderate Depression   15-19 Moderately Severe Depression   20-27 Severe Depression    Medical Records/Labs/Diagnostic Tests Reviewed:  [place] PDMP records - appropriate refills, no abuse suspected       Impression:  1.  Major depressive disorder, recurrent, in partial remission - worsening   2.  Binge eating disorder, moderate, in partial remission - stable  3.  Generalized anxiety disorder - stable  4.  Chronic insomnia - worsening     Plan:  1.  Continue Wellbutrin  mg in the morning for anxiety and depression.  If she does not feel better with resolution of that situation will consider adding an SSRI or SNRI medication.  2.  Continue Klonopin 0.5 mg daily as needed for anxiety.  E-prescribed x 2 months   3.  Continue Vyvanse 60 mg in the morning for binge eating disorder.  E-prescribed x 2 months.   4.  Restart Trazodone 25-50 mg at bedtime as needed for sleep.  Discussed that the combination of Wellbutrin and Vyvanse likely adds to her insomnia and if she starts feeling " okay will consider trying to lower down the dose of Vyvanse to 50 mg.  5.  Continue individual psychotherapy with Dr. Mckenna Jimenez, Ph.D  6.  Provided supportive psychotherapy (> 16 minutes).  Discussed how trying to get her taxes done for the last 5 years is likely contributing to stress, depression and insomnia.  Provided encouragement that she is starting to tackle the problem and since she is working with a professional she will have additional guidance.  Discussed how she blames herself on the person she became while she was in the last relationship and encouraged her to try and let go of that guilt.  Recommends continued self-care and encouraged her to stay active as much as possible.    Return to clinic in 2 months or sooner if symptoms worsen    The proposed treatment plan was discussed with the patient who was provided the opportunity to ask questions and make suggestions regarding alternative treatment. Patient verbalized understanding and expressed agreement with the plan.     Opal Garcia M.D.  04/08/22    This note was created using voice recognition software (Dragon). The accuracy of the dictation is limited by the abilities of the software. I have reviewed the note prior to signing, however some errors in grammar and context are still possible. If you have any questions related to this note please do not hesitate to contact our office.

## 2022-06-07 ENCOUNTER — TELEMEDICINE (OUTPATIENT)
Dept: BEHAVIORAL HEALTH | Facility: CLINIC | Age: 62
End: 2022-06-07
Payer: COMMERCIAL

## 2022-06-07 DIAGNOSIS — F50.81 BINGE-EATING DISORDER, IN PARTIAL REMISSION, MODERATE: ICD-10-CM

## 2022-06-07 DIAGNOSIS — F41.1 GENERALIZED ANXIETY DISORDER: ICD-10-CM

## 2022-06-07 DIAGNOSIS — F51.04 CHRONIC INSOMNIA: ICD-10-CM

## 2022-06-07 DIAGNOSIS — F33.41 MDD (MAJOR DEPRESSIVE DISORDER), RECURRENT, IN PARTIAL REMISSION (HCC): ICD-10-CM

## 2022-06-07 PROCEDURE — 99214 OFFICE O/P EST MOD 30 MIN: CPT | Mod: 95 | Performed by: PSYCHIATRY & NEUROLOGY

## 2022-06-07 PROCEDURE — 90833 PSYTX W PT W E/M 30 MIN: CPT | Mod: 95 | Performed by: PSYCHIATRY & NEUROLOGY

## 2022-06-07 RX ORDER — CLONAZEPAM 0.5 MG/1
0.5 TABLET ORAL
Qty: 30 TABLET | Refills: 1 | Status: SHIPPED | OUTPATIENT
Start: 2022-06-30 | End: 2022-07-30

## 2022-06-07 RX ORDER — BUPROPION HYDROCHLORIDE 150 MG/1
150 TABLET ORAL EVERY MORNING
Qty: 90 TABLET | Refills: 0 | Status: SHIPPED | OUTPATIENT
Start: 2022-06-07 | End: 2022-08-01 | Stop reason: SDUPTHER

## 2022-06-07 RX ORDER — LISDEXAMFETAMINE DIMESYLATE CAPSULES 60 MG/1
60 CAPSULE ORAL EVERY MORNING
Qty: 30 CAPSULE | Refills: 0 | Status: SHIPPED | OUTPATIENT
Start: 2022-07-29 | End: 2022-08-04 | Stop reason: SDUPTHER

## 2022-06-07 RX ORDER — LISDEXAMFETAMINE DIMESYLATE CAPSULES 60 MG/1
60 CAPSULE ORAL EVERY MORNING
Qty: 30 CAPSULE | Refills: 0 | Status: SHIPPED | OUTPATIENT
Start: 2022-06-30 | End: 2022-07-30

## 2022-06-07 RX ORDER — BUPROPION HYDROCHLORIDE 300 MG/1
300 TABLET ORAL EVERY MORNING
Qty: 90 TABLET | Refills: 0 | Status: SHIPPED | OUTPATIENT
Start: 2022-06-07 | End: 2022-08-01 | Stop reason: SDUPTHER

## 2022-06-07 RX ORDER — CLONAZEPAM 0.5 MG/1
0.5 TABLET ORAL
COMMUNITY
Start: 2022-06-01 | End: 2022-06-07 | Stop reason: SDUPTHER

## 2022-06-07 NOTE — PROGRESS NOTES
PSYCHIATRY VIRTUAL VISIT FOLLOW-UP NOTE      Chief Complaint   Patient presents with   • Follow-Up     Depression, anxiety, eating disorder       This evaluation was conducted via Zoom using secure and encrypted videoconferencing technology.   The patient was in their home in the [facility].    The patient's identity was confirmed and verbal consent was obtained for this virtual visit.    History Of Present Illness:  Sammi Alberto is a 62 y.o. female with major depressive disorder, generalized anxiety disorder, binge eating disorder, hypothyroidism, s/p lap band surgery in 2010, migraines, hepatitis C, parathyroidectomy, left ACL and meniscal repair comes in today for follow up, was last seen 2 months ago.  She has been doing all right in regards to her anxiety and depression since her last appointment with me.  She has been working with her CPA and getting her taxes done for the last few years.  She is done with 2 years and has 2 more to go.  She is still worried about the repercussions that can come from IRS.  She had a 2-week period last month where she isolated herself and was staying indoors as much as possible.  She did not even go to  her medications.  This period followed after she spent Mother's Day with her kids and they had an argument with her teacher.  She struggles with how the to have a good relationship with each other and to have a better relationship.  She has been told by her kids that they feel manipulated as she spends money on those special days.  She has been seeing a therapist on a regular basis.  She has been compliant with all of her medications.    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 30 Capsule 0   • traZODone (DESYREL) 50 MG Tab Take 0.5-1 Tablets by mouth at bedtime as needed for Sleep. 90 Tablet 0   • buPROPion (WELLBUTRIN XL) 300 MG XL tablet Take 1 Tablet by mouth every morning. Take in addition to Bupropion  mg for a total daily dose of 450 mg. 90 Tablet 0   • buPROPion (WELLBUTRIN XL) 150 MG XL tablet Take 1 Tablet by mouth every morning. Take in addition to Bupropion  mg for a total daily dose of 450 mg. 90 Tablet 0   • Melatonin 5 MG Cap Take  by mouth.     • zolmitriptan (ZOMIG) 5 MG tablet TAKE ONE TABLET BY MOUTH ONCE AS NEEDED FOR MIGRAINE, MAY REPEAT IN 2 HOURS. MAX DOSE IS 2 TABLETS IN 24 HOURS 10 Tablet 3   • topiramate (TOPAMAX) 25 MG Tab Take 1 Tablet by mouth 2 times a day. (Patient taking differently: Take 50 mg by mouth every day.) 180 Tablet 2   • levothyroxine (SYNTHROID) 112 MCG Tab Take 1 Tablet by mouth every morning on an empty stomach. 90 Tablet 3     No current facility-administered medications for this visit.       Review Of Systems:    Constitutional - Positive for fatigue  Psychiatric - Positive for infrequent depression, anxiety, sleep problems     Physical Examination:  Vital signs: LMP  (LMP Unknown)     Musculoskeletal: No abnormal movements.     Mental Status Evaluation:   General: Middle aged white female, " "dressed in casual attire, good grooming and hygiene, in no apparent distress, calm and cooperative, good eye contact, no psychomotor agitation or retardation  Orientation: Alert and oriented to person, place and time  Recent and remote memory: Grossly intact  Attention span and concentration: Grossly intact  Speech: Spontaneous, normal rate, rhythm and tone  Thought Process: Linear, logical and goal directed  Thought Content: Not evaluated today  Perception: No delusions noted  Associations: Intact  Language: Appropriate  Fund of knowledge and vocabulary: Grossly adequate  Mood: \"good\"  Affect: Euthymic, mood congruent  Insight: Good  Judgment: Good    Depression screening:  Depression Screen (PHQ-2/PHQ-9) 7/3/2019 8/12/2020 8/31/2021   PHQ-2 Total Score - - -   PHQ-2 Total Score - - 0   PHQ-2 Total Score 4 5 -   PHQ-9 Total Score - - -   PHQ-9 Total Score - - 0   PHQ-9 Total Score 13 17 -     Interpretation of PHQ-9 Total Score   Score Severity   1-4 No Depression   5-9 Mild Depression   10-14 Moderate Depression   15-19 Moderately Severe Depression   20-27 Severe Depression    Medical Records/Labs/Diagnostic Tests Reviewed:  [place] PDMP records - appropriate refills, no abuse suspected       Impression:  1.  Major depressive disorder, recurrent, in partial remission - stable  2.  Binge eating disorder, moderate, in partial remission - stable  3.  Generalized anxiety disorder - stable  4.  Chronic insomnia - improving     Plan:  1.  Continue Wellbutrin  mg in the morning for anxiety and depression  2.  Continue Klonopin 0.5 mg daily as needed for anxiety.  E-prescribed x 2 months   3.  Continue Vyvanse 60 mg in the morning for binge eating disorder.  E-prescribed x 2 months.   4.  Continue Trazodone 25 mg at bedtime and Melatonin 5 mg at bedtime as needed for sleep  5.  Continue weekly individual psychotherapy with Dr. Mckenna Jimenez, Ph.D  6.  Provided supportive psychotherapy (> 16 minutes).  Discussed her " emotional reaction to her sons having an argument with each other and how she cannot do much to change how they feel about each other.  Discussed her reasons for spending money on their kids and if she feels that she is manipulating her kids are not.  Encouraged her to try and have them spend their money on vacations since they are responsible adults.    Return to clinic in 2-3 months or sooner if symptoms worsen    The proposed treatment plan was discussed with the patient who was provided the opportunity to ask questions and make suggestions regarding alternative treatment. Patient verbalized understanding and expressed agreement with the plan.     Opal Garcia M.D.  06/07/22    This note was created using voice recognition software (Dragon). The accuracy of the dictation is limited by the abilities of the software. I have reviewed the note prior to signing, however some errors in grammar and context are still possible. If you have any questions related to this note please do not hesitate to contact our office.

## 2022-06-11 DIAGNOSIS — G43.009 MIGRAINE WITHOUT AURA AND WITHOUT STATUS MIGRAINOSUS, NOT INTRACTABLE: ICD-10-CM

## 2022-06-13 RX ORDER — TOPIRAMATE 25 MG/1
TABLET ORAL
Qty: 180 TABLET | Refills: 2 | Status: SHIPPED | OUTPATIENT
Start: 2022-06-13 | End: 2022-07-28 | Stop reason: SDUPTHER

## 2022-07-26 ENCOUNTER — TELEPHONE (OUTPATIENT)
Dept: BEHAVIORAL HEALTH | Facility: CLINIC | Age: 62
End: 2022-07-26
Payer: COMMERCIAL

## 2022-07-26 NOTE — TELEPHONE ENCOUNTER
Caller Name: Mckenna Carlson  Call Back Number: [phone]    Mckenna called and states she is concerned about pt's eating disorder and lack of nutruiontion. She is asking if it would benefit having lab work ordered for the pt. Mckenna said it's not urgent, but it is of concern. Pt has appt with Mckenna today.

## 2022-07-27 ENCOUNTER — APPOINTMENT (OUTPATIENT)
Dept: MEDICAL GROUP | Facility: MEDICAL CENTER | Age: 62
End: 2022-07-27
Payer: COMMERCIAL

## 2022-07-28 ENCOUNTER — OFFICE VISIT (OUTPATIENT)
Dept: MEDICAL GROUP | Facility: MEDICAL CENTER | Age: 62
End: 2022-07-28
Payer: COMMERCIAL

## 2022-07-28 VITALS
BODY MASS INDEX: 21.53 KG/M2 | WEIGHT: 150.35 LBS | RESPIRATION RATE: 16 BRPM | TEMPERATURE: 97.6 F | OXYGEN SATURATION: 99 % | HEIGHT: 70 IN | HEART RATE: 82 BPM | SYSTOLIC BLOOD PRESSURE: 110 MMHG | DIASTOLIC BLOOD PRESSURE: 60 MMHG

## 2022-07-28 DIAGNOSIS — N87.0 CIN I (CERVICAL INTRAEPITHELIAL NEOPLASIA I): ICD-10-CM

## 2022-07-28 DIAGNOSIS — R51.9 CHRONIC NONINTRACTABLE HEADACHE, UNSPECIFIED HEADACHE TYPE: ICD-10-CM

## 2022-07-28 DIAGNOSIS — E78.5 HYPERLIPIDEMIA, UNSPECIFIED HYPERLIPIDEMIA TYPE: ICD-10-CM

## 2022-07-28 DIAGNOSIS — Z12.31 ENCOUNTER FOR SCREENING MAMMOGRAM FOR MALIGNANT NEOPLASM OF BREAST: ICD-10-CM

## 2022-07-28 DIAGNOSIS — F33.41 MDD (MAJOR DEPRESSIVE DISORDER), RECURRENT, IN PARTIAL REMISSION (HCC): ICD-10-CM

## 2022-07-28 DIAGNOSIS — F41.1 GENERALIZED ANXIETY DISORDER: ICD-10-CM

## 2022-07-28 DIAGNOSIS — F50.81 BINGE-EATING DISORDER, IN PARTIAL REMISSION, MODERATE: ICD-10-CM

## 2022-07-28 DIAGNOSIS — E03.9 HYPOTHYROIDISM, UNSPECIFIED TYPE: ICD-10-CM

## 2022-07-28 DIAGNOSIS — B18.2 HEP C W/O COMA, CHRONIC (HCC): ICD-10-CM

## 2022-07-28 DIAGNOSIS — G43.009 MIGRAINE WITHOUT AURA AND WITHOUT STATUS MIGRAINOSUS, NOT INTRACTABLE: ICD-10-CM

## 2022-07-28 DIAGNOSIS — G89.29 CHRONIC NONINTRACTABLE HEADACHE, UNSPECIFIED HEADACHE TYPE: ICD-10-CM

## 2022-07-28 DIAGNOSIS — B00.1 COLD SORE: ICD-10-CM

## 2022-07-28 PROCEDURE — 99396 PREV VISIT EST AGE 40-64: CPT | Performed by: FAMILY MEDICINE

## 2022-07-28 RX ORDER — VALACYCLOVIR HYDROCHLORIDE 1 G/1
1000 TABLET, FILM COATED ORAL 2 TIMES DAILY
Qty: 20 TABLET | Refills: 0 | Status: SHIPPED | OUTPATIENT
Start: 2022-07-28

## 2022-07-28 RX ORDER — LEVOTHYROXINE SODIUM 112 UG/1
112 TABLET ORAL
Qty: 90 TABLET | Refills: 3 | Status: SHIPPED | OUTPATIENT
Start: 2022-07-28 | End: 2023-08-17

## 2022-07-28 RX ORDER — TOPIRAMATE 25 MG/1
25 TABLET ORAL 2 TIMES DAILY
Qty: 180 TABLET | Refills: 2 | Status: SHIPPED | OUTPATIENT
Start: 2022-07-28 | End: 2024-01-05

## 2022-07-28 RX ORDER — ZOLMITRIPTAN 5 MG/1
TABLET, FILM COATED ORAL
Qty: 10 TABLET | Refills: 3 | Status: SHIPPED | OUTPATIENT
Start: 2022-07-28 | End: 2023-07-19

## 2022-07-28 ASSESSMENT — PATIENT HEALTH QUESTIONNAIRE - PHQ9: CLINICAL INTERPRETATION OF PHQ2 SCORE: 0

## 2022-07-28 ASSESSMENT — FIBROSIS 4 INDEX: FIB4 SCORE: 1.15

## 2022-07-29 NOTE — PROGRESS NOTES
CC: Preventive annual exam    HPI:   Sammi presents today for preventive annual medical exam.  The following chronic medical issues discussed:    Hypothyroidism, unspecified type  She has been tolerating Levothyroxine, no palpitation, no cold or heat intolerance, currently on levothyroxine 112 mcg daily.  Needs medication refill,    MDD (major depressive disorder), recurrent, in partial remission (HCC)/ EDVIN (generalized anxiety disorder)  Patient has been doing fine on Wellbutrin.  Symptoms has improved.  Denies any suicidal ideation.  She has been following up with psychiatrist.     Binge-eating disorder, moderate  Patient symptoms as improved on current regimen.  She has been following up with psychiatrist.     Hyperlipidemia, unspecified hyperlipidemia type  Last lipid panel was abnormal.  Patient reluctant to take any medication.  No history of diabetes, CAD, or stroke     LICHA I (cervical intraepithelial neoplasia I)  Patient has history of LICHA 1, s/p partial cervical removal, has no cervix does not want to follow-up with gynecologist     Migraine without aura and without status migrainosus, not intractable  Symptoms has been stable.  Currently asymptomatic.  She has been doing fine on Zomig and Topamax.  Needs medication refill.  Has been following up with neurology Dr. Karimi.  Needs medication refill     Cold sore  Currently asymptomatic.  Patient uss Valtrex as needed and she got the cold sore.  Needs a refill         Patient Active Problem List    Diagnosis Date Noted   • Chronic insomnia 03/26/2020   • MDD (major depressive disorder), recurrent, in partial remission (HCC) 09/15/2017   • EDVIN (generalized anxiety disorder) 08/11/2017   • Binge-eating disorder, in partial remission, moderate 04/07/2017   • Hypothyroidism 11/01/2016   • Acute meniscal tear of left knee 10/15/2016   • Rupture of anterior cruciate ligament of left knee 10/15/2016       Current Outpatient Medications   Medication Sig Dispense  "Refill   • topiramate (TOPAMAX) 25 MG Tab Take 1 Tablet by mouth 2 times a day. 180 Tablet 2   • zolmitriptan (ZOMIG) 5 MG tablet TAKE ONE TABLET BY MOUTH ONCE AS NEEDED FOR MIGRAINE, MAY REPEAT IN 2 HOURS. MAX DOSE IS 2 TABLETS IN 24 HOURS 10 Tablet 3   • levothyroxine (SYNTHROID) 112 MCG Tab Take 1 Tablet by mouth every morning on an empty stomach. 90 Tablet 3   • valacyclovir (VALTREX) 1 GM Tab Take 1 Tablet by mouth 2 times a day. 20 Tablet 0   • traZODone (DESYREL) 50 MG Tab Take 0.5-1 Tablets by mouth at bedtime as needed for Sleep. 90 Tablet 0   • [START ON 7/29/2022] Lisdexamfetamine Dimesylate 60 MG Cap Take 1 Capsule by mouth every morning for 30 days. 30 Capsule 0   • Lisdexamfetamine Dimesylate 60 MG Cap Take 1 Capsule by mouth every morning for 30 days. 30 Capsule 0   • buPROPion (WELLBUTRIN XL) 150 MG XL tablet Take 1 Tablet by mouth every morning. Take in addition to Bupropion  mg for a total daily dose of 450 mg. 90 Tablet 0   • buPROPion (WELLBUTRIN XL) 300 MG XL tablet Take 1 Tablet by mouth every morning. Take in addition to Bupropion  mg for a total daily dose of 450 mg. 90 Tablet 0   • clonazePAM (KLONOPIN) 0.5 MG Tab Take 1 Tablet by mouth 1 time a day as needed (anxiety) for up to 30 days. 30 Tablet 1   • Melatonin 5 MG Cap Take  by mouth.       No current facility-administered medications for this visit.         Allergies as of 07/28/2022 - Reviewed 07/28/2022   Allergen Reaction Noted   • Bactrim ds  04/29/2019   • Codeine Rash 10/18/2016   • Sulfa drugs Unspecified 08/05/2020        ROS: Denies any chest pain, Shortness of breath, Changes bowel or bladder, Lower extremity edema.    Physical Exam:  /60 (BP Location: Right arm, Patient Position: Sitting, BP Cuff Size: Adult)   Pulse 82   Temp 36.4 °C (97.6 °F)   Resp 16   Ht 1.778 m (5' 10\") Comment: patient stated  Wt 68.2 kg (150 lb 5.7 oz)   LMP  (LMP Unknown)   SpO2 99%   BMI 21.57 kg/m²   Gen.: Well-developed, " well-nourished, no apparent distress,pleasant and cooperative with the examination  Skin:  Warm and dry with good turgor. No rashes or suspicious lesions in visible areas  Eye: PERRLA, conjunctiva and sclera clear, lids normal  HEENT:Sinuses nontender with palpation, TMs clear, nares patent with pink mucosa, and clear rhinorrhea,no septal deviation ,polyps or lesions. lips without lesions, oropharynx clear.  Neck: Trachea midline,no masses or adenopathy. No JVD.  Thyroid: normal consistency and size. No masses or nodules. Not tender with palpation.  Cor: Regular rate and rhythm without murmur, gallop or rub.  Lungs: Respirations unlabored.Clear to auscultation with equal breath sounds bilaterally. No wheezes, rhonchi.  Abdomen: Soft nontender without hepatosplenomegaly or masses appreciated, normoactive bowel sounds. No hernias.  Extremities: No cyanosis, clubbing or edema, Symmetrical without deformities or malformations. Pulses 2+ and symmetrical both upper and lower extremities  Psych: Alert and oriented x 3.Normal affect, judgement,insight and memory.    Assessment and Plan.   62 y.o. female     1. Hypothyroidism, unspecified type  He has been tolerating Levothyroxine, no palpitation, no cold or heat intolerance  Continue levothyroxine 112 mcg daily.    - CBC WITH DIFFERENTIAL; Future  - Comp Metabolic Panel; Future  - TSH; Future  - FREE THYROXINE; Future  - levothyroxine (SYNTHROID) 112 MCG Tab; Take 1 Tablet by mouth every morning on an empty stomach.  Dispense: 90 Tablet; Refill: 3    2. MDD (major depressive disorder), recurrent, in partial remission (HCC)/generalized anxiety  Mood has been fluctuating.  However stable.  .  Continue on Wellbutrin and clonazepam  Continue follow-up with psychiatrist.    4. Binge-eating disorder, in partial remission, moderate  Symptoms has been stable on current medication  Continue follow-up with psychiatrist    5. Chronic nonintractable headache, unspecified headache  type  Stable on Topamax  Takes zolmitriptan 5 mg for episodic migraine    6. Hyperlipidemia, unspecified hyperlipidemia type  Last lipid panel showed high total cholesterol and triglycerides  Patient is counseled on lifestyle medication  We will repeat lipid panel    - Lipid Profile; Future    7. LICHA I (cervical intraepithelial neoplasia I)  History of cervical movement.  Patient has no cervix  Does not want to follow-up with gynecologist    8. Cold sore  Currently asymptomatic.  Takes Valtrex when she develops symptoms    - valacyclovir (VALTREX) 1 GM Tab; Take 1 Tablet by mouth 2 times a day.  Dispense: 20 Tablet; Refill: 0    9. Hep C w/o coma, chronic (HCC)  History of chronic hepatitis C, last blood test showed viral load not detected    - HCV RNA PCR-GENOTYPE REFLEX; Future    10. Encounter for screening mammogram for malignant neoplasm of breast  Due for mammogram    - MA-SCREENING MAMMO BILAT W/CAD; Future    11. Migraine without aura and without status migrainosus, not intractable    - topiramate (TOPAMAX) 25 MG Tab; Take 1 Tablet by mouth 2 times a day.  Dispense: 180 Tablet; Refill: 2  - zolmitriptan (ZOMIG) 5 MG tablet; TAKE ONE TABLET BY MOUTH ONCE AS NEEDED FOR MIGRAINE, MAY REPEAT IN 2 HOURS. MAX DOSE IS 2 TABLETS IN 24 HOURS  Dispense: 10 Tablet; Refill: 3

## 2022-08-01 ENCOUNTER — PATIENT MESSAGE (OUTPATIENT)
Dept: BEHAVIORAL HEALTH | Facility: CLINIC | Age: 62
End: 2022-08-01
Payer: COMMERCIAL

## 2022-08-01 DIAGNOSIS — F33.41 MDD (MAJOR DEPRESSIVE DISORDER), RECURRENT, IN PARTIAL REMISSION (HCC): ICD-10-CM

## 2022-08-01 DIAGNOSIS — F50.81 BINGE-EATING DISORDER, IN PARTIAL REMISSION, MODERATE: ICD-10-CM

## 2022-08-01 DIAGNOSIS — F41.1 GENERALIZED ANXIETY DISORDER: ICD-10-CM

## 2022-08-01 DIAGNOSIS — F51.04 CHRONIC INSOMNIA: ICD-10-CM

## 2022-08-03 RX ORDER — TRAZODONE HYDROCHLORIDE 50 MG/1
25-50 TABLET ORAL
Qty: 90 TABLET | Refills: 0
Start: 2022-08-03

## 2022-08-03 RX ORDER — BUPROPION HYDROCHLORIDE 300 MG/1
300 TABLET ORAL EVERY MORNING
Qty: 90 TABLET | Refills: 0 | Status: SHIPPED | OUTPATIENT
Start: 2022-08-03 | End: 2022-09-14 | Stop reason: SDUPTHER

## 2022-08-03 RX ORDER — BUPROPION HYDROCHLORIDE 150 MG/1
150 TABLET ORAL EVERY MORNING
Qty: 90 TABLET | Refills: 0 | Status: SHIPPED | OUTPATIENT
Start: 2022-08-03 | End: 2022-09-14 | Stop reason: SDUPTHER

## 2022-08-03 RX ORDER — LISDEXAMFETAMINE DIMESYLATE CAPSULES 60 MG/1
60 CAPSULE ORAL EVERY MORNING
Qty: 30 CAPSULE | Refills: 0
Start: 2022-08-03 | End: 2022-09-02

## 2022-08-04 RX ORDER — LISDEXAMFETAMINE DIMESYLATE CAPSULES 60 MG/1
60 CAPSULE ORAL EVERY MORNING
Qty: 34 CAPSULE | Refills: 0 | Status: SHIPPED | OUTPATIENT
Start: 2022-08-04 | End: 2022-09-07

## 2022-09-09 ENCOUNTER — TELEMEDICINE (OUTPATIENT)
Dept: BEHAVIORAL HEALTH | Facility: CLINIC | Age: 62
End: 2022-09-09
Payer: COMMERCIAL

## 2022-09-09 DIAGNOSIS — F41.1 GENERALIZED ANXIETY DISORDER: ICD-10-CM

## 2022-09-09 DIAGNOSIS — F51.04 CHRONIC INSOMNIA: ICD-10-CM

## 2022-09-09 DIAGNOSIS — F33.0 MDD (MAJOR DEPRESSIVE DISORDER), RECURRENT EPISODE, MILD (HCC): ICD-10-CM

## 2022-09-09 DIAGNOSIS — F50.81 BINGE-EATING DISORDER, IN PARTIAL REMISSION, MODERATE: ICD-10-CM

## 2022-09-09 PROCEDURE — 90833 PSYTX W PT W E/M 30 MIN: CPT | Mod: 95 | Performed by: PSYCHIATRY & NEUROLOGY

## 2022-09-09 PROCEDURE — 99214 OFFICE O/P EST MOD 30 MIN: CPT | Mod: 95 | Performed by: PSYCHIATRY & NEUROLOGY

## 2022-09-09 RX ORDER — LISDEXAMFETAMINE DIMESYLATE CAPSULES 60 MG/1
60 CAPSULE ORAL EVERY MORNING
Qty: 30 CAPSULE | Refills: 0 | Status: SHIPPED | OUTPATIENT
Start: 2022-10-08 | End: 2022-11-07

## 2022-09-09 RX ORDER — LISDEXAMFETAMINE DIMESYLATE CAPSULES 60 MG/1
60 CAPSULE ORAL EVERY MORNING
Qty: 30 CAPSULE | Refills: 0 | Status: SHIPPED | OUTPATIENT
Start: 2022-11-06 | End: 2022-12-06

## 2022-09-09 RX ORDER — TRAZODONE HYDROCHLORIDE 50 MG/1
25-50 TABLET ORAL
Qty: 90 TABLET | Refills: 0 | Status: SHIPPED | OUTPATIENT
Start: 2022-09-09 | End: 2023-03-03 | Stop reason: SDUPTHER

## 2022-09-09 RX ORDER — CLONAZEPAM 0.5 MG/1
TABLET ORAL
COMMUNITY
Start: 2022-08-04 | End: 2022-12-06

## 2022-09-09 RX ORDER — LISDEXAMFETAMINE DIMESYLATE CAPSULES 60 MG/1
60 CAPSULE ORAL EVERY MORNING
Qty: 30 CAPSULE | Refills: 0 | Status: SHIPPED | OUTPATIENT
Start: 2022-09-09 | End: 2022-10-09

## 2022-09-09 RX ORDER — CLONAZEPAM 0.5 MG/1
0.5 TABLET ORAL
Qty: 30 TABLET | Refills: 2 | Status: SHIPPED | OUTPATIENT
Start: 2022-09-09 | End: 2022-10-09

## 2022-09-09 NOTE — PROGRESS NOTES
PSYCHIATRY VIRTUAL VISIT FOLLOW-UP NOTE      Chief Complaint   Patient presents with    Follow-Up     Depression, anxiety, eating disorder       This evaluation was conducted via Zoom using secure and encrypted videoconferencing technology.   The patient was in their home in the [facility].    The patient's identity was confirmed and verbal consent was obtained for this virtual visit.    History Of Present Illness:  Sammi Alberto is a 62 y.o. female with major depressive disorder, generalized anxiety disorder, binge eating disorder, hypothyroidism, s/p lap band surgery in 2010, migraines, hepatitis C, parathyroidectomy, left ACL and meniscal repair comes in today for follow up, was last seen 3 months ago.  She has had some struggles with depression and binge eating in the last few months.  She spent almost a month with her brother who had a major accident.  There was an emotional visit for her as they talked a lot about their childhood.  She was able to let go of some grudges that she held onto for decades and feels that she has a better relationship with her brother.  She is still having relationship stressors with her younger son.  She has been seeing a therapist on a weekly basis.  She is almost done with paying her taxes and feels relieved.  She has been having episodes of feeling nothing throughout the day except candy.  She has been compliant with her medications.  She denies having thoughts of wanting to hurt herself.    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 Take in addition to Bupropion  mg for a total daily dose of 450 mg. 90 Tablet 0    buPROPion (WELLBUTRIN XL) 150 MG XL tablet Take 1 Tablet by mouth every morning. Take in addition to Bupropion  mg for a total daily dose of 450 mg. 90 Tablet 0    topiramate (TOPAMAX) 25 MG Tab Take 1 Tablet by mouth 2 times a day. 180 Tablet 2    zolmitriptan (ZOMIG) 5 MG tablet TAKE ONE TABLET BY MOUTH ONCE AS NEEDED FOR MIGRAINE, MAY REPEAT IN 2 HOURS. MAX DOSE IS 2 TABLETS IN 24 HOURS 10 Tablet 3    levothyroxine (SYNTHROID) 112 MCG Tab Take 1 Tablet by mouth every morning on an empty stomach. 90 Tablet 3    valacyclovir (VALTREX) 1 GM Tab Take 1 Tablet by mouth 2 times a day. 20 Tablet 0    traZODone (DESYREL) 50 MG Tab Take 0.5-1 Tablets by mouth at bedtime as needed for Sleep. 90 Tablet 0    Melatonin 5 MG Cap Take  by mouth.       No current facility-administered medications for this visit.       Review Of Systems:    Constitutional - Positive for fatigue  Psychiatric - Positive for infrequent depression, anxiety, sleep problems     Physical Examination:  Vital signs: LMP  (LMP Unknown)     Musculoskeletal: No abnormal movements.     Mental Status Evaluation:   General: Middle aged white female, dressed in casual attire, good grooming and hygiene, in no apparent distress, calm and cooperative, good eye contact, no psychomotor agitation or retardation  Orientation: Alert and oriented to person, place and " "time  Recent and remote memory: Grossly intact  Attention span and concentration: Grossly intact  Speech: Spontaneous, normal rate, rhythm and tone  Thought Process: Linear, logical and goal directed  Thought Content: Not evaluated today  Perception: No delusions noted  Associations: Intact  Language: Appropriate  Fund of knowledge and vocabulary: Grossly adequate  Mood: \"good\"  Affect: Euthymic, mood congruent  Insight: Good  Judgment: Good    Depression screening:  Depression Screen (PHQ-2/PHQ-9) 8/12/2020 8/31/2021 7/28/2022   PHQ-2 Total Score - - -   PHQ-2 Total Score - 0 -   PHQ-2 Total Score 5 - 0   PHQ-9 Total Score - - -   PHQ-9 Total Score - 0 -   PHQ-9 Total Score 17 - -     Interpretation of PHQ-9 Total Score   Score Severity   1-4 No Depression   5-9 Mild Depression   10-14 Moderate Depression   15-19 Moderately Severe Depression   20-27 Severe Depression    Medical Records/Labs/Diagnostic Tests Reviewed:  [place] PDMP records - appropriate refills, no abuse suspected       Impression:  1.  Major depressive disorder, recurrent, mild - worsening   2.  Binge eating disorder, moderate, in partial remission - stable  3.  Generalized anxiety disorder - worsening  4.  Chronic insomnia - stable     Plan:  1.  Continue Wellbutrin  mg in the morning for anxiety and depression  2.  Continue Klonopin 0.5 mg daily as needed for anxiety.  E-prescribed x 3 months   3.  Continue Vyvanse 60 mg in the morning for binge eating disorder.  E-prescribed x 3 months.   4.  Continue Trazodone 25 mg at bedtime and Melatonin 5 mg at bedtime as needed for sleep  5.  Continue weekly individual psychotherapy with Dr. Mckenna Jimenez, Ph.D  6.  Discussed restarting Prozac given worsening depression and binge eating symptoms but she would like to think about it.  She will let me know through [employer] if and when she feels ready.  6.  Provided supportive psychotherapy (> 16 minutes): Recent visit with her brother and talking about " childhood trauma, discussed looking at positive that she has a better relationship with her brother now etc.    Return to clinic in 3 months or sooner if symptoms worsen    The proposed treatment plan was discussed with the patient who was provided the opportunity to ask questions and make suggestions regarding alternative treatment. Patient verbalized understanding and expressed agreement with the plan.     Opal Garcia M.D.  09/09/22    This note was created using voice recognition software (Dragon). The accuracy of the dictation is limited by the abilities of the software. I have reviewed the note prior to signing, however some errors in grammar and context are still possible. If you have any questions related to this note please do not hesitate to contact our office.

## 2022-09-14 DIAGNOSIS — F41.1 GENERALIZED ANXIETY DISORDER: ICD-10-CM

## 2022-09-14 DIAGNOSIS — F33.41 MDD (MAJOR DEPRESSIVE DISORDER), RECURRENT, IN PARTIAL REMISSION (HCC): ICD-10-CM

## 2022-09-14 RX ORDER — BUPROPION HYDROCHLORIDE 150 MG/1
150 TABLET ORAL EVERY MORNING
Qty: 90 TABLET | Refills: 0 | Status: SHIPPED | OUTPATIENT
Start: 2022-09-14 | End: 2022-12-06 | Stop reason: SDUPTHER

## 2022-09-14 RX ORDER — BUPROPION HYDROCHLORIDE 300 MG/1
300 TABLET ORAL EVERY MORNING
Qty: 90 TABLET | Refills: 0 | Status: SHIPPED | OUTPATIENT
Start: 2022-09-14 | End: 2022-12-06 | Stop reason: SDUPTHER

## 2022-10-28 NOTE — PATIENT COMMUNICATION
Pt is requesting an early fill will be out of town 8/8/-9/6.    Received request via: Patient    Was the patient seen in the last year in this department? Yes    Does the patient have an active prescription (recently filled or refills available) for medication(s) requested? No     [Dear  ___] : Dear  [unfilled], [Consult Letter:] : I had the pleasure of evaluating your patient, [unfilled]. [Please see my note below.] : Please see my note below. [Referral Closing:] : Thank you very much for seeing this patient.  If you have any questions, please do not hesitate to contact me. [Sincerely,] : Sincerely, [FreeTextEntry3] : Gonzalez Fitzgerald MD\par [facility]- Co-Director\par [facility]ician [facility]  [DrDebo  ___] : Dr. FOWLER

## 2022-12-06 ENCOUNTER — TELEMEDICINE (OUTPATIENT)
Dept: BEHAVIORAL HEALTH | Facility: CLINIC | Age: 62
End: 2022-12-06
Payer: COMMERCIAL

## 2022-12-06 DIAGNOSIS — F51.04 CHRONIC INSOMNIA: ICD-10-CM

## 2022-12-06 DIAGNOSIS — F33.0 MDD (MAJOR DEPRESSIVE DISORDER), RECURRENT EPISODE, MILD (HCC): ICD-10-CM

## 2022-12-06 DIAGNOSIS — F41.1 GENERALIZED ANXIETY DISORDER: ICD-10-CM

## 2022-12-06 DIAGNOSIS — F50.81 BINGE-EATING DISORDER, IN PARTIAL REMISSION, MODERATE: ICD-10-CM

## 2022-12-06 PROCEDURE — 99214 OFFICE O/P EST MOD 30 MIN: CPT | Mod: 95 | Performed by: PSYCHIATRY & NEUROLOGY

## 2022-12-06 PROCEDURE — 90833 PSYTX W PT W E/M 30 MIN: CPT | Mod: 95 | Performed by: PSYCHIATRY & NEUROLOGY

## 2022-12-06 RX ORDER — BUPROPION HYDROCHLORIDE 150 MG/1
150 TABLET ORAL EVERY MORNING
Qty: 90 TABLET | Refills: 0 | Status: SHIPPED | OUTPATIENT
Start: 2022-12-06 | End: 2023-03-03 | Stop reason: SDUPTHER

## 2022-12-06 RX ORDER — OXYCODONE HYDROCHLORIDE AND ACETAMINOPHEN 5; 325 MG/1; MG/1
TABLET ORAL
COMMUNITY
Start: 2022-12-02 | End: 2023-03-03

## 2022-12-06 RX ORDER — CLONAZEPAM 0.5 MG/1
0.5 TABLET ORAL
Qty: 30 TABLET | Refills: 2 | Status: SHIPPED | OUTPATIENT
Start: 2022-12-10 | End: 2023-01-20 | Stop reason: SDUPTHER

## 2022-12-06 RX ORDER — PROMETHAZINE HYDROCHLORIDE 25 MG/1
TABLET ORAL
COMMUNITY
Start: 2022-12-02 | End: 2023-06-02

## 2022-12-06 RX ORDER — BUPROPION HYDROCHLORIDE 300 MG/1
300 TABLET ORAL EVERY MORNING
Qty: 90 TABLET | Refills: 0 | Status: SHIPPED | OUTPATIENT
Start: 2022-12-06 | End: 2023-03-03 | Stop reason: SDUPTHER

## 2022-12-06 RX ORDER — CEPHALEXIN 500 MG/1
CAPSULE ORAL
COMMUNITY
Start: 2022-12-04 | End: 2023-03-03

## 2022-12-06 RX ORDER — SCOLOPAMINE TRANSDERMAL SYSTEM 1 MG/1
PATCH, EXTENDED RELEASE TRANSDERMAL
COMMUNITY
Start: 2022-12-04 | End: 2023-03-03

## 2022-12-06 RX ORDER — LISDEXAMFETAMINE DIMESYLATE CAPSULES 60 MG/1
60 CAPSULE ORAL EVERY MORNING
Qty: 30 CAPSULE | Refills: 0 | Status: SHIPPED | OUTPATIENT
Start: 2022-12-10 | End: 2023-01-09

## 2022-12-06 RX ORDER — LISDEXAMFETAMINE DIMESYLATE CAPSULES 60 MG/1
60 CAPSULE ORAL EVERY MORNING
Qty: 30 CAPSULE | Refills: 0 | Status: SHIPPED | OUTPATIENT
Start: 2023-02-06 | End: 2023-03-08

## 2022-12-06 RX ORDER — LISDEXAMFETAMINE DIMESYLATE CAPSULES 60 MG/1
60 CAPSULE ORAL EVERY MORNING
Qty: 30 CAPSULE | Refills: 0 | Status: SHIPPED | OUTPATIENT
Start: 2023-01-08 | End: 2023-02-07

## 2022-12-06 NOTE — PROGRESS NOTES
"PSYCHIATRY VIRTUAL VISIT FOLLOW-UP NOTE      Chief Complaint   Patient presents with    Follow-Up     Depression, anxiety     This evaluation was conducted via Zoom using secure and encrypted videoconferencing technology.   The patient was in their home in the [facility].    The patient's identity was confirmed and verbal consent was obtained for this virtual visit.    History Of Present Illness:  Sammi Alberto is a 62 y.o. female with major depressive disorder, generalized anxiety disorder, binge eating disorder, hypothyroidism, s/p lap band surgery in 2010, migraines, hepatitis C, parathyroidectomy, left ACL and meniscal repair comes in today for follow up, was last seen 3 months ago.  She has been doing all right in regards to her depression and anxiety.  She visited all 3 of her sisters and had some nice conversations with them and she is feeling good about it.  She is also taking a step back when it comes to her kids and wants them to work on their relationship.  She is trying to do things for herself, is planning a trip to [facility] in [facility] in April.  She has been compliant with her medications.  She denies having thoughts of wanting to hurt herself.    Social History:   She is single, ended 4-year long relationship in 9/2017,  and  x 1, 2 sons from marriage - live in [place] and [place], 4 siblings - 3 sisters (live in [place] and [place]) and brother (lives in [place]), lives in [place], son's girlfriend lives with her, full time employed as contractor.    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 Take in addition to Bupropion  mg for a total daily dose of 450 mg. 90 Tablet 0    buPROPion (WELLBUTRIN XL) 150 MG XL tablet Take 1 Tablet by mouth every morning. Take in addition to Bupropion  mg for a total daily dose of 450 mg. 90 Tablet 0    clonazePAM (KLONOPIN) 0.5 MG Tab TAKE 1 TABLET BY MOUTH 1 TIME A DAY AS NEEDED (ANXIETY) FOR UP TO 30 DAYS.      Lisdexamfetamine Dimesylate 60 MG Cap Take 1 Capsule by mouth every morning for 30 days. 30 Capsule 0    traZODone (DESYREL) 50 MG Tab Take 0.5-1 Tablets by mouth at bedtime as needed for Sleep. 90 Tablet 0    topiramate (TOPAMAX) 25 MG Tab Take 1 Tablet by mouth 2 times a day. 180 Tablet 2    zolmitriptan (ZOMIG) 5 MG tablet TAKE ONE TABLET BY MOUTH ONCE AS NEEDED FOR MIGRAINE, MAY REPEAT IN 2 HOURS. MAX DOSE IS 2 TABLETS IN 24 HOURS 10 Tablet 3    levothyroxine (SYNTHROID) 112 MCG Tab Take 1 Tablet by mouth every morning on an empty stomach. 90 Tablet 3    valacyclovir (VALTREX) 1 GM Tab Take 1 Tablet by mouth 2 times a day. 20 Tablet 0    Melatonin 5 MG Cap Take  by mouth.       No current facility-administered medications for this visit.     Review Of Systems:    Psychiatric - Positive for depression, anxiety, sleep problems     Physical Examination:  Vital signs: LMP  (LMP Unknown)     Musculoskeletal: No abnormal movements.     Mental Status Evaluation:   General: Middle aged white female, dressed in casual attire, good grooming and hygiene, in no apparent distress, calm and cooperative, good eye contact, no psychomotor agitation or retardation  Orientation: Alert and oriented to person, place and time  Recent and remote memory: Grossly intact  Attention span and concentration: Grossly intact  Speech: Spontaneous, normal rate, rhythm and tone  Thought Process: Linear,  "logical and goal directed  Thought Content: Denies suicidal or homicidal thoughts, intent or plan  Perception: No delusions noted  Associations: Intact  Language: Appropriate  Fund of knowledge and vocabulary: Grossly adequate  Mood: \"good\"  Affect: Euthymic, mood congruent  Insight: Good  Judgment: Good    Depression screenin/12/2020     3:00 PM 2021     4:58 PM 2022     5:00 PM   Depression Screen (PHQ-2/PHQ-9)   PHQ-2 Total Score  0    PHQ-2 Total Score 5  0   PHQ-9 Total Score  0    PHQ-9 Total Score 17       Interpretation of PHQ-9 Total Score   Score Severity   1-4 No Depression   5-9 Mild Depression   10-14 Moderate Depression   15-19 Moderately Severe Depression   20-27 Severe Depression    Medical Records/Labs/Diagnostic Tests Reviewed:  [place] PDMP records - appropriate refills, no abuse suspected       Impression:  1.  Major depressive disorder, recurrent, mild - stable   2.  Binge eating disorder, moderate, in partial remission - stable  3.  Generalized anxiety disorder - stable  4.  Chronic insomnia - stable     Plan:  1.  Continue Wellbutrin  mg in the morning for anxiety and depression  2.  Continue Klonopin 0.5 mg daily as needed for anxiety.  E-prescribed x 3 months   3.  Continue Vyvanse 60 mg in the morning for binge eating disorder.  E-prescribed x 3 months.   4.  Continue Trazodone 25 mg at bedtime and Melatonin 5 mg at bedtime as needed for sleep  5.  Continue weekly individual psychotherapy with Dr. Mckenna Jimenez, Ph.D  6.  Provided supportive psychotherapy (> 16 minutes): talked about difficult conversations that she has been having with siblings and kids, encouraged to continue doing things for herself, advised continued self-care etc.    Return to clinic in 3 months or sooner if symptoms worsen    The proposed treatment plan was discussed with the patient who was provided the opportunity to ask questions and make suggestions regarding alternative treatment. Patient " verbalized understanding and expressed agreement with the plan.     Opal Garcia M.D.  12/06/22    This note was created using voice recognition software (Dragon). The accuracy of the dictation is limited by the abilities of the software. I have reviewed the note prior to signing, however some errors in grammar and context are still possible. If you have any questions related to this note please do not hesitate to contact our office.

## 2023-01-20 DIAGNOSIS — F41.1 GENERALIZED ANXIETY DISORDER: ICD-10-CM

## 2023-01-20 RX ORDER — CLONAZEPAM 0.5 MG/1
0.5 TABLET ORAL
Qty: 30 TABLET | Refills: 1 | Status: SHIPPED | OUTPATIENT
Start: 2023-01-20 | End: 2023-01-26 | Stop reason: ALTCHOICE

## 2023-01-20 NOTE — TELEPHONE ENCOUNTER
Please send clonazepam to [facility] on [address].    Received request via: Pharmacy    Was the patient seen in the last year in this department? Yes    Does the patient have an active prescription (recently filled or refills available) for medication(s) requested? No    Does the patient have shelter Plus and need 100 day supply (blood pressure, diabetes and cholesterol meds only)? Medication is not for cholesterol, blood pressure or diabetes and Patient does not have SCP'

## 2023-01-23 ENCOUNTER — PATIENT MESSAGE (OUTPATIENT)
Dept: BEHAVIORAL HEALTH | Facility: CLINIC | Age: 63
End: 2023-01-23
Payer: COMMERCIAL

## 2023-01-23 DIAGNOSIS — F41.1 GAD (GENERALIZED ANXIETY DISORDER): ICD-10-CM

## 2023-01-26 RX ORDER — CLONAZEPAM 1 MG/1
0.5 TABLET ORAL
Qty: 15 TABLET | Refills: 1 | Status: SHIPPED | OUTPATIENT
Start: 2023-01-26 | End: 2023-03-03 | Stop reason: SDUPTHER

## 2023-03-03 ENCOUNTER — TELEMEDICINE (OUTPATIENT)
Dept: BEHAVIORAL HEALTH | Facility: CLINIC | Age: 63
End: 2023-03-03
Payer: COMMERCIAL

## 2023-03-03 DIAGNOSIS — F50.81 BINGE-EATING DISORDER, IN PARTIAL REMISSION, MODERATE: ICD-10-CM

## 2023-03-03 DIAGNOSIS — F41.1 GENERALIZED ANXIETY DISORDER: ICD-10-CM

## 2023-03-03 DIAGNOSIS — F51.04 CHRONIC INSOMNIA: ICD-10-CM

## 2023-03-03 DIAGNOSIS — F33.0 MDD (MAJOR DEPRESSIVE DISORDER), RECURRENT EPISODE, MILD (HCC): ICD-10-CM

## 2023-03-03 PROCEDURE — 90833 PSYTX W PT W E/M 30 MIN: CPT | Mod: 95 | Performed by: PSYCHIATRY & NEUROLOGY

## 2023-03-03 PROCEDURE — 99214 OFFICE O/P EST MOD 30 MIN: CPT | Mod: 95 | Performed by: PSYCHIATRY & NEUROLOGY

## 2023-03-03 RX ORDER — LISDEXAMFETAMINE DIMESYLATE CAPSULES 60 MG/1
60 CAPSULE ORAL EVERY MORNING
Qty: 30 CAPSULE | Refills: 0 | Status: CANCELLED | OUTPATIENT
Start: 2023-04-21 | End: 2023-05-21

## 2023-03-03 RX ORDER — LISDEXAMFETAMINE DIMESYLATE CAPSULES 60 MG/1
60 CAPSULE ORAL EVERY MORNING
Qty: 60 CAPSULE | Refills: 0 | Status: SHIPPED | OUTPATIENT
Start: 2023-03-23 | End: 2023-03-22 | Stop reason: SDUPTHER

## 2023-03-03 RX ORDER — BUPROPION HYDROCHLORIDE 150 MG/1
150 TABLET ORAL EVERY MORNING
Qty: 90 TABLET | Refills: 0 | Status: SHIPPED | OUTPATIENT
Start: 2023-03-03 | End: 2023-06-02

## 2023-03-03 RX ORDER — BUPROPION HYDROCHLORIDE 300 MG/1
300 TABLET ORAL EVERY MORNING
Qty: 90 TABLET | Refills: 0 | Status: SHIPPED | OUTPATIENT
Start: 2023-03-03 | End: 2023-06-02 | Stop reason: SDUPTHER

## 2023-03-03 RX ORDER — TRAZODONE HYDROCHLORIDE 50 MG/1
25-50 TABLET ORAL
Qty: 90 TABLET | Refills: 0 | Status: SHIPPED | OUTPATIENT
Start: 2023-03-03 | End: 2023-06-02 | Stop reason: SDUPTHER

## 2023-03-03 RX ORDER — CLONAZEPAM 0.5 MG/1
0.5 TABLET ORAL
Qty: 60 TABLET | Refills: 0 | Status: SHIPPED | OUTPATIENT
Start: 2023-03-03 | End: 2023-03-22 | Stop reason: SDUPTHER

## 2023-03-03 RX ORDER — LISDEXAMFETAMINE DIMESYLATE CAPSULES 60 MG/1
60 CAPSULE ORAL EVERY MORNING
Qty: 30 CAPSULE | Refills: 0 | Status: CANCELLED | OUTPATIENT
Start: 2023-05-20 | End: 2023-06-19

## 2023-03-03 NOTE — PROGRESS NOTES
"PSYCHIATRY VIRTUAL VISIT FOLLOW-UP NOTE    Chief Complaint   Patient presents with    Follow-Up     Depression, anxiety, eating disorder     This evaluation was conducted via Zoom using secure and encrypted videoconferencing technology.   The patient was in their home in the [facility].    The patient's identity was confirmed and verbal consent was obtained for this virtual visit.    History Of Present Illness:  Sammi Alberto is a 62 y.o. female with major depressive disorder, generalized anxiety disorder, binge eating disorder, hypothyroidism, s/p lap band surgery in 2010, migraines, hepatitis C, parathyroidectomy, left ACL and meniscal repair comes in today for follow up, was last seen 3 months ago.  She continues to have difficulties with both anxiety and depression.  She has not been going over the summer because of anxiety.  However, she has booked a trip to [facility] and plans on being there for the next 4 to 6 weeks.  She continues to have a difficult time moving on from her last relationship that ended about 5 years ago.  She has a hard time forgiving herself for being in the relationship and trusting her ex-boyfriend.  She has been compliant with her medications.      Social History:   She is single,  and  x 1, 2 sons - live in [place] and [place], 4 siblings - 3 sisters (live in [place] and [place]) and brother (lives in [place]), lives in [place], son's girlfriend lives with her, full time employed as contractor.    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 30 Capsule 0    cephALEXin (KEFLEX) 500 MG Cap  (Patient not taking: Reported on 12/6/2022)      oxyCODONE-acetaminophen (PERCOCET) 5-325 MG Tab  (Patient not taking: Reported on 12/6/2022)      promethazine (PHENERGAN) 25 MG Tab       scopolamine (TRANSDERM-SCOP) 1 mg/72hr PATCH 72 HR       buPROPion (WELLBUTRIN XL) 150 MG XL tablet Take 1 Tablet by mouth every morning. Take in addition to Bupropion  mg for a total daily dose of 450 mg. 90 Tablet 0    buPROPion (WELLBUTRIN XL) 300 MG XL tablet Take 1 Tablet by mouth every morning. Take in addition to Bupropion  mg for a total daily dose of 450 mg. 90 Tablet 0    traZODone (DESYREL) 50 MG Tab Take 0.5-1 Tablets by mouth at bedtime as needed for Sleep. 90 Tablet 0    topiramate (TOPAMAX) 25 MG Tab Take 1 Tablet by mouth 2 times a day. 180 Tablet 2    zolmitriptan (ZOMIG) 5 MG tablet TAKE ONE TABLET BY MOUTH ONCE AS NEEDED FOR MIGRAINE, MAY REPEAT IN 2 HOURS. MAX DOSE IS 2 TABLETS IN 24 HOURS 10 Tablet 3    levothyroxine (SYNTHROID) 112 MCG Tab Take 1 Tablet by mouth every morning on an empty stomach. 90 Tablet 3    valacyclovir (VALTREX) 1 GM Tab Take 1 Tablet by mouth 2 times a day. 20 Tablet 0    Melatonin 5 MG Cap Take  by mouth.       No current facility-administered medications for this visit.     Review Of Systems:    Psychiatric - Positive for depression, anxiety, sleep problems     Physical Examination:  Vital signs: LMP  (LMP Unknown)     Musculoskeletal: No abnormal movements.     Mental Status Evaluation:   General: Middle aged female, dressed in casual attire, good grooming and hygiene, in no apparent distress, calm and cooperative, good eye contact, no psychomotor agitation or retardation  Orientation: Alert and oriented to person, place and time  Recent and remote memory: Grossly intact  Attention span and  "concentration: Grossly intact  Speech: Spontaneous, normal rate, rhythm and tone  Thought Process: Linear, logical and goal directed  Thought Content: Not evaluated todau  Perception: No delusions noted  Associations: Intact  Language: Appropriate  Fund of knowledge and vocabulary: Grossly adequate  Mood: \"so-so\"  Affect: Euthymic, mood congruent  Insight: Good  Judgment: Good    Depression screenin/12/2020     3:00 PM 2021     4:58 PM 2022     5:00 PM   Depression Screen (PHQ-2/PHQ-9)   PHQ-2 Total Score  0    PHQ-2 Total Score 5  0   PHQ-9 Total Score  0    PHQ-9 Total Score 17       Interpretation of PHQ-9 Total Score   Score Severity   1-4 No Depression   5-9 Mild Depression   10-14 Moderate Depression   15-19 Moderately Severe Depression   20-27 Severe Depression    Medical Records/Labs/Diagnostic Tests Reviewed:  [place] PDMP records - appropriate refills, no abuse suspected       Impression:  1.  Major depressive disorder, recurrent, mild - stable   2.  Binge eating disorder, moderate, in partial remission - stable  3.  Generalized anxiety disorder - stable  4.  Chronic insomnia - stable     Plan:  1.  Continue Wellbutrin  mg in the morning for anxiety and depression  2.  Continue Klonopin 0.5 mg daily as needed for anxiety.  E-prescribed x 2 months   3.  Continue Vyvanse 60 mg in the morning for binge eating disorder.  E-prescribed x 2 months.   4.  Continue Trazodone 25 mg at bedtime and Melatonin 5 mg at bedtime as needed for sleep  5.  Continue weekly individual psychotherapy with Dr. Mckenna Jimenez, Ph.D  6.  Provided supportive psychotherapy (> 16 minutes): Discussed how working in a remote setting and [facility] has made her feel more comfortable being at home and going outside discussing anxiety, discussed her last relationship and how she has not been able to move on which is contributing to depression and anxiety    Return to clinic in 3 months or sooner if symptoms worsen    The " proposed treatment plan was discussed with the patient who was provided the opportunity to ask questions and make suggestions regarding alternative treatment. Patient verbalized understanding and expressed agreement with the plan.     Opal Garcia M.D.  03/03/23    This note was created using voice recognition software (Dragon). The accuracy of the dictation is limited by the abilities of the software. I have reviewed the note prior to signing, however some errors in grammar and context are still possible. If you have any questions related to this note please do not hesitate to contact our office.

## 2023-03-22 ENCOUNTER — PATIENT MESSAGE (OUTPATIENT)
Dept: BEHAVIORAL HEALTH | Facility: CLINIC | Age: 63
End: 2023-03-22
Payer: COMMERCIAL

## 2023-03-22 DIAGNOSIS — F41.1 GENERALIZED ANXIETY DISORDER: ICD-10-CM

## 2023-03-22 DIAGNOSIS — F50.81 BINGE-EATING DISORDER, IN PARTIAL REMISSION, MODERATE: ICD-10-CM

## 2023-03-22 NOTE — PATIENT COMMUNICATION
Pt is requesting an early refill on Vyvanse. Please advise.    Received request via: Patient    Was the patient seen in the last year in this department? Yes    Does the patient have an active prescription (recently filled or refills available) for medication(s) requested? No    Does the patient have prison Plus and need 100 day supply (blood pressure, diabetes and cholesterol meds only)? Medication is not for cholesterol, blood pressure or diabetes and Patient does not have SCP

## 2023-03-23 RX ORDER — CLONAZEPAM 0.5 MG/1
0.5 TABLET ORAL
Qty: 60 TABLET | Refills: 0 | Status: SHIPPED | OUTPATIENT
Start: 2023-03-23 | End: 2023-05-22

## 2023-03-23 RX ORDER — LISDEXAMFETAMINE DIMESYLATE CAPSULES 60 MG/1
60 CAPSULE ORAL EVERY MORNING
Qty: 60 CAPSULE | Refills: 0 | Status: SHIPPED | OUTPATIENT
Start: 2023-03-23 | End: 2023-05-22

## 2023-06-02 ENCOUNTER — TELEMEDICINE (OUTPATIENT)
Dept: BEHAVIORAL HEALTH | Facility: CLINIC | Age: 63
End: 2023-06-02

## 2023-06-02 DIAGNOSIS — F51.04 CHRONIC INSOMNIA: ICD-10-CM

## 2023-06-02 DIAGNOSIS — F50.81 BINGE-EATING DISORDER, IN PARTIAL REMISSION, MODERATE: ICD-10-CM

## 2023-06-02 DIAGNOSIS — F41.1 GENERALIZED ANXIETY DISORDER: ICD-10-CM

## 2023-06-02 DIAGNOSIS — F33.41 MDD (MAJOR DEPRESSIVE DISORDER), RECURRENT, IN PARTIAL REMISSION (HCC): ICD-10-CM

## 2023-06-02 PROCEDURE — 99214 OFFICE O/P EST MOD 30 MIN: CPT | Mod: 95 | Performed by: PSYCHIATRY & NEUROLOGY

## 2023-06-02 RX ORDER — LISDEXAMFETAMINE DIMESYLATE CAPSULES 60 MG/1
60 CAPSULE ORAL EVERY MORNING
Qty: 30 CAPSULE | Refills: 0 | Status: SHIPPED | OUTPATIENT
Start: 2023-07-19 | End: 2023-08-18

## 2023-06-02 RX ORDER — LISDEXAMFETAMINE DIMESYLATE CAPSULES 60 MG/1
60 CAPSULE ORAL EVERY MORNING
Qty: 30 CAPSULE | Refills: 0 | Status: SHIPPED | OUTPATIENT
Start: 2023-08-17 | End: 2023-08-24 | Stop reason: SDUPTHER

## 2023-06-02 RX ORDER — TRAZODONE HYDROCHLORIDE 50 MG/1
25-50 TABLET ORAL
Qty: 90 TABLET | Refills: 0 | Status: SHIPPED | OUTPATIENT
Start: 2023-06-02 | End: 2023-09-15

## 2023-06-02 RX ORDER — LISDEXAMFETAMINE DIMESYLATE CAPSULES 60 MG/1
60 CAPSULE ORAL EVERY MORNING
Qty: 30 CAPSULE | Refills: 0 | Status: SHIPPED | OUTPATIENT
Start: 2023-06-20 | End: 2023-07-20

## 2023-06-02 RX ORDER — BUPROPION HYDROCHLORIDE 300 MG/1
300 TABLET ORAL EVERY MORNING
Qty: 90 TABLET | Refills: 0 | Status: SHIPPED | OUTPATIENT
Start: 2023-06-02 | End: 2023-09-15 | Stop reason: SDUPTHER

## 2023-06-02 NOTE — PROGRESS NOTES
PSYCHIATRY VIRTUAL VISIT FOLLOW-UP NOTE    Chief Complaint   Patient presents with    Follow-Up     Depression, anxiety, eating disorder     This evaluation was conducted via Zoom using secure and encrypted videoconferencing technology.   The patient was in their home in the [facility].    The patient's identity was confirmed and verbal consent was obtained for this virtual visit.    History Of Present Illness:  Sammi Alberto is a 63 y.o. female with major depressive disorder, generalized anxiety disorder, binge eating disorder, hypothyroidism, s/p lap band surgery in 2010, migraines, hepatitis C, parathyroidectomy, left ACL and meniscal repair comes in today for follow up, was last seen 3 months ago.  She took a 5-week trip to [facility] which was really good for her mental health.  She feels transformed by this trip as she has been able to let go of her last relationship that has been holding her back for over 5 years.  She took her journal with her which she had written a lot about when she was with her last boyfriend and she ended up burning pages from the journal on that trip which really helped.  She feels that she is feeling a lot lighter and less anxious since she has been back home.  She is looking forward to skiing in the next few weeks.  She has not utilized Klonopin since she went on the trip.  She has also cut down on her dose of Wellbutrin as she would like to be on less medications.  She has been doing all right in regards to her eating, has been trying to cook more at home.  She is doing all right in regards to her sleep.  She denies having thoughts of wanting to hurt herself.    Social History:   She is single,  and  x 1, 2 sons - live in [place] and [place], 4 siblings - 3 sisters (live in [place] and [place]) and brother (lives in [place]), lives in [place], son's girlfriend lives with her, full time contractor.    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, sober since the  "age of 16 years  Nicotine - Denies  Cannabis - Denies   Illicit drugs - Denies    Past Medication Trials:  Prozac 40 mg (effective), Effexor  mg (initally effective), Zoloft 200 mg (ineffective), Lexapro 20 mg (stopped after she had acute drop in platelet count while she was on Bactrim), Nortriptyline, Abilify 10 mg (s/e - \"made me mean\"), Lunesta (effective)    Medications:  Current Outpatient Medications   Medication Sig Dispense Refill    traZODone (DESYREL) 50 MG Tab Take 0.5-1 Tablets by mouth at bedtime as needed for Sleep. 90 Tablet 0    buPROPion (WELLBUTRIN XL) 300 MG XL tablet Take 1 Tablet by mouth every morning. Take in addition to Bupropion  mg for a total daily dose of 450 mg. 90 Tablet 0    buPROPion (WELLBUTRIN XL) 150 MG XL tablet Take 1 Tablet by mouth every morning. Take in addition to Bupropion  mg for a total daily dose of 450 mg. 90 Tablet 0    promethazine (PHENERGAN) 25 MG Tab       topiramate (TOPAMAX) 25 MG Tab Take 1 Tablet by mouth 2 times a day. 180 Tablet 2    zolmitriptan (ZOMIG) 5 MG tablet TAKE ONE TABLET BY MOUTH ONCE AS NEEDED FOR MIGRAINE, MAY REPEAT IN 2 HOURS. MAX DOSE IS 2 TABLETS IN 24 HOURS 10 Tablet 3    levothyroxine (SYNTHROID) 112 MCG Tab Take 1 Tablet by mouth every morning on an empty stomach. 90 Tablet 3    valacyclovir (VALTREX) 1 GM Tab Take 1 Tablet by mouth 2 times a day. 20 Tablet 0    Melatonin 5 MG Cap Take  by mouth.       No current facility-administered medications for this visit.     Review Of Systems:    Psychiatric - Positive for depression, anxiety    Physical Examination:  Vital signs: LMP  (LMP Unknown)     Musculoskeletal: No abnormal movements.     Mental Status Evaluation:   General: Middle aged female, dressed in casual attire, good grooming and hygiene, in no apparent distress, calm and cooperative, good eye contact, no psychomotor agitation or retardation  Orientation: Alert and oriented to person, place and time  Recent and " "remote memory: Grossly intact  Attention span and concentration: Grossly intact  Speech: Spontaneous, normal rate, rhythm and tone  Thought Process: Linear, logical and goal directed  Thought Content: Denies suicidal or homicidal ideations, intent or plan  Perception: No delusions noted  Associations: Intact  Language: Appropriate  Fund of knowledge and vocabulary: Grossly adequate  Mood: \"really good\"  Affect: Euthymic, mood congruent  Insight: Good  Judgment: Good    Depression screenin/12/2020     3:00 PM 2021     4:58 PM 2022     5:00 PM   Depression Screen (PHQ-2/PHQ-9)   PHQ-2 Total Score  0    PHQ-2 Total Score 5  0   PHQ-9 Total Score  0    PHQ-9 Total Score 17       Interpretation of PHQ-9 Total Score   Score Severity   1-4 No Depression   5-9 Mild Depression   10-14 Moderate Depression   15-19 Moderately Severe Depression   20-27 Severe Depression    Medical Records/Labs/Diagnostic Tests Reviewed:  [place] PDMP records - appropriate refills, no abuse suspected       Impression:  1.  Major depressive disorder, recurrent, in partial remission - improving    2.  Binge eating disorder, moderate, in partial remission - stable  3.  Generalized anxiety disorder - improving  4.  Chronic insomnia - stable     Plan:  1.  Decrease Wellbutrin XL to 300 mg in the morning for anxiety and depression  2.  Continue Klonopin 0.5 mg daily as needed for anxiety.  Not prescribed today.   3.  Continue Vyvanse 60 mg in the morning for binge eating disorder.  E-prescribed x 3 months.   4.  Continue Trazodone 25-50 mg at bedtime and Melatonin 5 mg at bedtime as needed for sleep  5.  Continue monthly individual psychotherapy with Dr. Mckenna Jimenez, Ph.D    Return to clinic in 3 months or sooner if symptoms worsen    The proposed treatment plan was discussed with the patient who was provided the opportunity to ask questions and make suggestions regarding alternative treatment. Patient verbalized understanding and " expressed agreement with the plan.     Opal Garcia M.D.  06/02/23    This note was created using voice recognition software (Dragon). The accuracy of the dictation is limited by the abilities of the software. I have reviewed the note prior to signing, however some errors in grammar and context are still possible. If you have any questions related to this note please do not hesitate to contact our office.

## 2023-07-19 DIAGNOSIS — G43.009 MIGRAINE WITHOUT AURA AND WITHOUT STATUS MIGRAINOSUS, NOT INTRACTABLE: ICD-10-CM

## 2023-07-19 RX ORDER — ZOLMITRIPTAN 5 MG/1
TABLET, FILM COATED ORAL
Qty: 10 TABLET | Refills: 3 | Status: SHIPPED | OUTPATIENT
Start: 2023-07-19 | End: 2024-02-28

## 2023-07-19 NOTE — TELEPHONE ENCOUNTER
Received request via: Pharmacy    Was the patient seen in the last year in this department? Yes    Does the patient have an active prescription (recently filled or refills available) for medication(s) requested? No    Does the patient have residential Plus and need 100 day supply (blood pressure, diabetes and cholesterol meds only)? Patient does not have SCP    Last office visit 07/28/2022  Last labs 10/28/2020

## 2023-07-25 DIAGNOSIS — F41.1 GAD (GENERALIZED ANXIETY DISORDER): ICD-10-CM

## 2023-07-25 RX ORDER — CLONAZEPAM 0.5 MG/1
0.5 TABLET ORAL
Qty: 30 TABLET | Refills: 0 | Status: SHIPPED | OUTPATIENT
Start: 2023-07-25 | End: 2023-08-24

## 2023-08-17 DIAGNOSIS — E03.9 HYPOTHYROIDISM, UNSPECIFIED TYPE: ICD-10-CM

## 2023-08-17 RX ORDER — LEVOTHYROXINE SODIUM 112 UG/1
112 TABLET ORAL
Qty: 90 TABLET | Refills: 2 | Status: SHIPPED | OUTPATIENT
Start: 2023-08-17

## 2023-09-15 ENCOUNTER — TELEMEDICINE (OUTPATIENT)
Dept: BEHAVIORAL HEALTH | Facility: CLINIC | Age: 63
End: 2023-09-15

## 2023-09-15 DIAGNOSIS — F51.04 CHRONIC INSOMNIA: ICD-10-CM

## 2023-09-15 DIAGNOSIS — F33.41 MDD (MAJOR DEPRESSIVE DISORDER), RECURRENT, IN PARTIAL REMISSION (HCC): ICD-10-CM

## 2023-09-15 DIAGNOSIS — F41.1 GENERALIZED ANXIETY DISORDER: ICD-10-CM

## 2023-09-15 DIAGNOSIS — F50.81 BINGE-EATING DISORDER, IN PARTIAL REMISSION, MODERATE: ICD-10-CM

## 2023-09-15 PROCEDURE — 99214 OFFICE O/P EST MOD 30 MIN: CPT | Mod: 95 | Performed by: PSYCHIATRY & NEUROLOGY

## 2023-09-15 PROCEDURE — 90833 PSYTX W PT W E/M 30 MIN: CPT | Mod: 95 | Performed by: PSYCHIATRY & NEUROLOGY

## 2023-09-15 RX ORDER — LISDEXAMFETAMINE DIMESYLATE CAPSULES 60 MG/1
60 CAPSULE ORAL EVERY MORNING
Qty: 30 CAPSULE | Refills: 0 | Status: SHIPPED | OUTPATIENT
Start: 2023-10-21 | End: 2023-11-20

## 2023-09-15 RX ORDER — LISDEXAMFETAMINE DIMESYLATE CAPSULES 60 MG/1
60 CAPSULE ORAL EVERY MORNING
Qty: 30 CAPSULE | Refills: 0 | Status: SHIPPED | OUTPATIENT
Start: 2023-11-19 | End: 2023-12-19

## 2023-09-15 RX ORDER — CLONAZEPAM 0.5 MG/1
0.5 TABLET ORAL
Qty: 30 TABLET | Refills: 0 | Status: SHIPPED | OUTPATIENT
Start: 2023-09-15 | End: 2023-11-30 | Stop reason: SDUPTHER

## 2023-09-15 RX ORDER — LISDEXAMFETAMINE DIMESYLATE CAPSULES 60 MG/1
60 CAPSULE ORAL EVERY MORNING
Qty: 30 CAPSULE | Refills: 0 | Status: SHIPPED | OUTPATIENT
Start: 2023-09-22 | End: 2023-10-03 | Stop reason: SDUPTHER

## 2023-09-15 RX ORDER — BUPROPION HYDROCHLORIDE 300 MG/1
300 TABLET ORAL EVERY MORNING
Qty: 90 TABLET | Refills: 0 | Status: SHIPPED | OUTPATIENT
Start: 2023-09-15 | End: 2023-11-30 | Stop reason: SDUPTHER

## 2023-09-15 RX ORDER — TRAZODONE HYDROCHLORIDE 50 MG/1
50-100 TABLET ORAL
Qty: 180 TABLET | Refills: 0 | Status: SHIPPED | OUTPATIENT
Start: 2023-09-15 | End: 2023-12-15

## 2023-09-15 ASSESSMENT — PATIENT HEALTH QUESTIONNAIRE - PHQ9
8. MOVING OR SPEAKING SO SLOWLY THAT OTHER PEOPLE COULD HAVE NOTICED. OR THE OPPOSITE, BEING SO FIGETY OR RESTLESS THAT YOU HAVE BEEN MOVING AROUND A LOT MORE THAN USUAL: NOT AT ALL
1. LITTLE INTEREST OR PLEASURE IN DOING THINGS: NOT AT ALL
SUM OF ALL RESPONSES TO PHQ9 QUESTIONS 1 AND 2: 0
6. FEELING BAD ABOUT YOURSELF - OR THAT YOU ARE A FAILURE OR HAVE LET YOURSELF OR YOUR FAMILY DOWN: NOT AL ALL
2. FEELING DOWN, DEPRESSED, IRRITABLE, OR HOPELESS: NOT AT ALL
4. FEELING TIRED OR HAVING LITTLE ENERGY: NOT AT ALL
3. TROUBLE FALLING OR STAYING ASLEEP OR SLEEPING TOO MUCH: NOT AT ALL
7. TROUBLE CONCENTRATING ON THINGS, SUCH AS READING THE NEWSPAPER OR WATCHING TELEVISION: NOT AT ALL
9. THOUGHTS THAT YOU WOULD BE BETTER OFF DEAD, OR OF HURTING YOURSELF: NOT AT ALL
SUM OF ALL RESPONSES TO PHQ QUESTIONS 1-9: 0
5. POOR APPETITE OR OVEREATING: NOT AT ALL

## 2023-09-15 NOTE — PROGRESS NOTES
PSYCHIATRY VIRTUAL VISIT FOLLOW-UP NOTE    Chief Complaint   Patient presents with    Follow-Up     Depression, anxiety     This evaluation was conducted via Zoom using secure and encrypted videoconferencing technology.   The patient was in their home in the [facility].    The patient's identity was confirmed and verbal consent was obtained for this virtual visit.    History Of Present Illness:  Sammi Alberto is a 63 y.o. female with major depressive disorder, generalized anxiety disorder, binge eating disorder, hypothyroidism, s/p lap band surgery in 2010, migraines, hepatitis C, parathyroidectomy, left ACL and meniscal repair comes in today for follow up, was last seen 3 months ago.  She has been doing good in regards to her mental health.  She has been compliant with her medications.  She graduated from therapy and will be seeing a therapist only on an as needed basis.  She is trying to focus on herself.  She is traveling and seeing friends and family as much as possible.  She denies any new work-related stressors.  She has been compliant with her medications.  She is still having some difficulties with sleep and is utilizing Trazodone on a nightly basis.  She is still having nights where she wakes up in the middle of the night and has to take Klonopin to help get her few hours of sleep.  She has been taking Klonopin minimally during daytime.  She is looking forward to another trip to [place] with a friend in December.  She denies having thoughts of hurt herself.    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 30 Capsule 0    [START ON 10/21/2023] Lisdexamfetamine Dimesylate 60 MG Cap Take 1 Capsule by mouth every morning for 30 days. 30 Capsule 0    [START ON 9/22/2023] Lisdexamfetamine Dimesylate 60 MG Cap Take 1 Capsule by mouth every morning for 30 days. 30 Capsule 0    traZODone (DESYREL) 50 MG Tab Take 1-2 Tablets by mouth at bedtime as needed for Sleep. 180 Tablet 0    buPROPion (WELLBUTRIN XL) 300 MG XL tablet Take 1 Tablet by mouth every morning. 90 Tablet 0    clonazePAM (KLONOPIN) 0.5 MG Tab Take 1 Tablet by mouth 1 time a day as needed (anxiety) for up to 30 days. 30 Tablet 0    Lisdexamfetamine Dimesylate 60 MG Cap Take 1 Capsule by mouth every morning for 30 days. 30 Capsule 0    levothyroxine (SYNTHROID) 112 MCG Tab TAKE 1 TABLET BY MOUTH EVERY DAY IN THE MORNING ON AN EMPTY STOMACH 90 Tablet 2    zolmitriptan (ZOMIG) 5 MG tablet TAKE 1 TAB BY MOUTH ONCE AS NEEDED FOR MIGRAINE MAY REPEAT 1 TAB IN 2 HOURS MAX 2 TABS IN 24 HOURS 10 Tablet 3    topiramate (TOPAMAX) 25 MG Tab Take 1 Tablet by mouth 2 times a day. 180 Tablet 2    valacyclovir (VALTREX) 1 GM Tab Take 1 Tablet by mouth 2 times a day. 20 Tablet 0     No current facility-administered medications for this visit.     Review Of Systems:    Psychiatric - Positive for depression, anxiety    Physical Examination:  Vital signs: LMP  (LMP Unknown)     Musculoskeletal: No abnormal movements.     Mental Status Evaluation:   General: Middle aged female, tearful, dressed in casual attire, good grooming and hygiene, in no " "apparent distress, calm and cooperative, good eye contact, no psychomotor agitation or retardation  Orientation: Alert and oriented to person, place and time  Recent and remote memory: Grossly intact  Attention span and concentration: Grossly intact  Speech: Spontaneous, normal rate, rhythm and tone  Thought Process: Linear, logical and goal directed  Thought Content: Denies suicidal or homicidal ideations, intent or plan  Perception: No delusions noted  Associations: Intact  Language: Appropriate  Fund of knowledge and vocabulary: Grossly adequate  Mood: \"good\"  Affect: Euthymic, mood congruent  Insight: Good  Judgment: Good    Depression screenin/31/2021     4:58 PM 2022     5:00 PM 9/15/2023     2:03 PM   Depression Screen (PHQ-2/PHQ-9)   PHQ-2 Total Score 0  0   PHQ-2 Total Score  0    PHQ-9 Total Score 0  0     Interpretation of PHQ-9 Total Score   Score Severity   1-4 No Depression   5-9 Mild Depression   10-14 Moderate Depression   15-19 Moderately Severe Depression   20-27 Severe Depression    Medical Records/Labs/Diagnostic Tests Reviewed:  [place] PDMP records - appropriate refills, no abuse suspected       Impression:  1.  Major depressive disorder, recurrent, in partial remission - stable    2.  Binge eating disorder, moderate, in partial remission - stable  3.  Generalized anxiety disorder - stable  4.  Chronic insomnia - stable     Plan:  1.  Continue Wellbutrin  mg in the morning for anxiety and depression  2.  Continue Klonopin 0.5 mg daily as needed for anxiety.  E-prescribed x 30 days.  I have advised her to use Klonopin only to manage anxiety.  3.  Continue Vyvanse 60 mg in the morning for binge eating disorder.  E-prescribed x 3 months.   4.  Increase Trazodone to 50 to 100 mg at bedtime for sleep.  I have advised her to take 50 mg before going to bed and take another 25 to 50 mg if she wakes up in the middle of the night.  5.  Continue as needed individual psychotherapy with " Mckenna Jimenez, Ph.D  6.  Discussed nonpharmacological ways to improve sleep including white noise machine, avoiding Vyvanse if she forgets to take it by 8 AM, looking at replacing mattress/pillows etc.  7.  Provided supportive psychotherapy (>16 minutes): Encouragement in regards to changes that have happened since her trip to [facility], advised continued focus on herself so that she is moving forward and enjoying life    Return to clinic in 3 months or sooner if symptoms worsen    The proposed treatment plan was discussed with the patient who was provided the opportunity to ask questions and make suggestions regarding alternative treatment. Patient verbalized understanding and expressed agreement with the plan.     Opal Garcia M.D.  09/15/23    This note was created using voice recognition software (Dragon). The accuracy of the dictation is limited by the abilities of the software. I have reviewed the note prior to signing, however some errors in grammar and context are still possible. If you have any questions related to this note please do not hesitate to contact our office.

## 2023-09-19 ENCOUNTER — PATIENT MESSAGE (OUTPATIENT)
Dept: BEHAVIORAL HEALTH | Facility: CLINIC | Age: 63
End: 2023-09-19
Payer: COMMERCIAL

## 2023-09-19 DIAGNOSIS — F41.1 GAD (GENERALIZED ANXIETY DISORDER): ICD-10-CM

## 2023-09-19 DIAGNOSIS — F33.41 MDD (MAJOR DEPRESSIVE DISORDER), RECURRENT, IN PARTIAL REMISSION (HCC): ICD-10-CM

## 2023-09-22 RX ORDER — BUPROPION HYDROCHLORIDE 150 MG/1
150 TABLET ORAL EVERY MORNING
Qty: 90 TABLET | Refills: 0 | Status: SHIPPED | OUTPATIENT
Start: 2023-09-22 | End: 2023-11-30 | Stop reason: SDUPTHER

## 2023-10-03 ENCOUNTER — PATIENT MESSAGE (OUTPATIENT)
Dept: BEHAVIORAL HEALTH | Facility: CLINIC | Age: 63
End: 2023-10-03
Payer: COMMERCIAL

## 2023-10-03 DIAGNOSIS — F50.81 BINGE-EATING DISORDER, IN PARTIAL REMISSION, MODERATE: ICD-10-CM

## 2023-10-03 RX ORDER — LISDEXAMFETAMINE DIMESYLATE CAPSULES 60 MG/1
60 CAPSULE ORAL EVERY MORNING
Qty: 30 CAPSULE | Refills: 0 | Status: SHIPPED | OUTPATIENT
Start: 2023-10-03 | End: 2023-11-02

## 2023-10-03 NOTE — PATIENT COMMUNICATION
Can one of you please send Vyvanse to [facility] on [address]? They have limited supply. Thank you!

## 2023-12-15 DIAGNOSIS — F51.04 CHRONIC INSOMNIA: ICD-10-CM

## 2023-12-15 RX ORDER — TRAZODONE HYDROCHLORIDE 50 MG/1
50-100 TABLET ORAL
Qty: 180 TABLET | Refills: 0 | Status: SHIPPED | OUTPATIENT
Start: 2023-12-15

## 2024-01-02 DIAGNOSIS — F41.1 GENERALIZED ANXIETY DISORDER: ICD-10-CM

## 2024-01-02 RX ORDER — CLONAZEPAM 0.5 MG/1
0.5 TABLET ORAL
Qty: 30 TABLET | Refills: 0 | Status: SHIPPED | OUTPATIENT
Start: 2024-01-02 | End: 2024-02-02

## 2024-01-05 ENCOUNTER — TELEMEDICINE (OUTPATIENT)
Dept: BEHAVIORAL HEALTH | Facility: CLINIC | Age: 64
End: 2024-01-05
Payer: COMMERCIAL

## 2024-01-05 DIAGNOSIS — F41.1 GENERALIZED ANXIETY DISORDER: ICD-10-CM

## 2024-01-05 DIAGNOSIS — F50.81 BINGE-EATING DISORDER, IN PARTIAL REMISSION, MODERATE: ICD-10-CM

## 2024-01-05 DIAGNOSIS — F33.41 MDD (MAJOR DEPRESSIVE DISORDER), RECURRENT, IN PARTIAL REMISSION (HCC): ICD-10-CM

## 2024-01-05 DIAGNOSIS — F51.04 CHRONIC INSOMNIA: ICD-10-CM

## 2024-01-05 PROCEDURE — 99214 OFFICE O/P EST MOD 30 MIN: CPT | Mod: GT | Performed by: PSYCHIATRY & NEUROLOGY

## 2024-01-05 RX ORDER — LISDEXAMFETAMINE DIMESYLATE CAPSULES 60 MG/1
60 CAPSULE ORAL EVERY MORNING
Qty: 30 CAPSULE | Refills: 0 | Status: SHIPPED | OUTPATIENT
Start: 2024-03-31 | End: 2024-04-30

## 2024-01-05 RX ORDER — LISDEXAMFETAMINE DIMESYLATE CAPSULES 60 MG/1
60 CAPSULE ORAL EVERY MORNING
Qty: 30 CAPSULE | Refills: 0 | Status: SHIPPED | OUTPATIENT
Start: 2024-03-02 | End: 2024-04-01

## 2024-01-05 RX ORDER — BUPROPION HYDROCHLORIDE 150 MG/1
150 TABLET ORAL EVERY MORNING
Qty: 90 TABLET | Refills: 1 | Status: SHIPPED | OUTPATIENT
Start: 2024-01-05

## 2024-01-05 RX ORDER — LISDEXAMFETAMINE DIMESYLATE CAPSULES 60 MG/1
CAPSULE ORAL
COMMUNITY
Start: 2024-01-02 | End: 2024-03-19

## 2024-01-05 RX ORDER — BUPROPION HYDROCHLORIDE 300 MG/1
300 TABLET ORAL EVERY MORNING
Qty: 90 TABLET | Refills: 1 | Status: SHIPPED | OUTPATIENT
Start: 2024-01-05

## 2024-01-05 RX ORDER — LISDEXAMFETAMINE DIMESYLATE CAPSULES 60 MG/1
60 CAPSULE ORAL EVERY MORNING
Qty: 30 CAPSULE | Refills: 0 | Status: SHIPPED | OUTPATIENT
Start: 2024-02-02 | End: 2024-03-03

## 2024-01-05 NOTE — PROGRESS NOTES
"PSYCHIATRY VIRTUAL VISIT FOLLOW-UP NOTE    Chief Complaint   Patient presents with    Follow-Up     Depression, anxiety, eating disorder     This evaluation was conducted via Zoom using secure and encrypted videoconferencing technology.   The patient was in their home in the [facility].    The patient's identity was confirmed and verbal consent was obtained for this virtual visit.    History Of Present Illness:  Sammi Alberto is a 63 y.o. female with major depressive disorder, generalized anxiety disorder, binge eating disorder, hypothyroidism, s/p lap band surgery in 2010, migraines, hepatitis C, parathyroidectomy, left ACL and meniscal repair comes in today for follow up, was last seen over 3 months ago.  She has been doing good in regards to her mental health.  She is compliant with her medications and is endorsing benefit.  She took a trip to [facility] in December and had a really nice time there and is looking forward to a trip to [place] in February.  She denies any new psychosocial stressors.  She talked about relationship difficulties with her younger son.  She has been doing all right in regards to her eating.  She denies having thoughts of wanting to hurt herself.    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 30 Capsule 0    [START ON 3/2/2024] Lisdexamfetamine Dimesylate 60 MG Cap Take 1 Capsule by mouth every morning for 30 days. 30 Capsule 0    [START ON 3/31/2024] Lisdexamfetamine Dimesylate 60 MG Cap Take 1 Capsule by mouth every morning for 30 days. 30 Capsule 0    buPROPion (WELLBUTRIN XL) 150 MG XL tablet Take 1 Tablet by mouth every morning. Take in addition to Wellbutrin  mg for total daily dose of 450 mg. 90 Tablet 1    buPROPion (WELLBUTRIN XL) 300 MG XL tablet Take 1 Tablet by mouth every morning. Take in addition to Wellbutrin  mg for total daily dose of 450 mg. 90 Tablet 1    clonazePAM (KLONOPIN) 0.5 MG Tab Take 1 Tablet by mouth 1 time a day as needed (anxiety) for up to 30 days. 30 Tablet 0    traZODone (DESYREL) 50 MG Tab Take 1-2 Tablets by mouth at bedtime as needed for Sleep. 180 Tablet 0    levothyroxine (SYNTHROID) 112 MCG Tab TAKE 1 TABLET BY MOUTH EVERY DAY IN THE MORNING ON AN EMPTY STOMACH 90 Tablet 2    zolmitriptan (ZOMIG) 5 MG tablet TAKE 1 TAB BY MOUTH ONCE AS NEEDED FOR MIGRAINE MAY REPEAT 1 TAB IN 2 HOURS MAX 2 TABS IN 24 HOURS 10 Tablet 3    valacyclovir (VALTREX) 1 GM Tab Take 1 Tablet by mouth 2 times a day. 20 Tablet 0     No current facility-administered medications for this visit.     Review Of Systems:    Psychiatric - Positive for depression, anxiety    Physical Examination:  Vital signs: LMP  (LMP Unknown)     Musculoskeletal: No abnormal movements.     Mental Status Evaluation:   General: Middle aged female, dressed in casual attire, good grooming and hygiene, in no apparent distress, calm and cooperative, good eye contact, no psychomotor agitation or retardation  Orientation: Alert and oriented to person, place and time  Recent and remote memory: Grossly  "intact  Attention span and concentration: Grossly intact  Speech: Spontaneous, normal rate, rhythm and tone  Thought Process: Linear, logical and goal directed  Thought Content: Denies suicidal or homicidal ideations, intent or plan  Perception: No delusions noted  Associations: Intact  Language: Appropriate  Fund of knowledge and vocabulary: Grossly adequate  Mood: \"pretty good\"  Affect: Euthymic, mood congruent  Insight: Good  Judgment: Good    Depression screenin/31/2021     4:58 PM 2022     5:00 PM 9/15/2023     2:03 PM   Depression Screen (PHQ-2/PHQ-9)   PHQ-2 Total Score 0  0   PHQ-2 Total Score  0    PHQ-9 Total Score 0  0     Interpretation of PHQ-9 Total Score   Score Severity   1-4 No Depression   5-9 Mild Depression   10-14 Moderate Depression   15-19 Moderately Severe Depression   20-27 Severe Depression    Medical Records/Labs/Diagnostic Tests Reviewed:  [place] PDMP records - appropriate refills, no abuse suspected       Impression:  1.  Major depressive disorder, recurrent, in partial remission - stable    2.  Binge eating disorder, moderate, in partial remission - stable  3.  Generalized anxiety disorder - stable  4.  Chronic insomnia - stable     Plan:  1.  Continue Wellbutrin  mg in the morning for anxiety and depression  2.  Continue Klonopin 0.5 mg daily as needed for anxiety.  Not prescribed today.  3.  Continue Vyvanse 60 mg in the morning for binge eating disorder.  E-prescribed x 3 months.   4.  Continue Trazodone 50 to 100 mg at bedtime for sleep  5.  Continue as needed individual psychotherapy with Dr. Mckenna Jimenez, Ph.D    Return to clinic in 4 months or sooner if symptoms worsen    The proposed treatment plan was discussed with the patient who was provided the opportunity to ask questions and make suggestions regarding alternative treatment. Patient verbalized understanding and expressed agreement with the plan.     Opal Garcia M.D.  24    This note was created " using voice recognition software (Dragon). The accuracy of the dictation is limited by the abilities of the software. I have reviewed the note prior to signing, however some errors in grammar and context are still possible. If you have any questions related to this note please do not hesitate to contact our office.

## 2024-02-02 DIAGNOSIS — F41.1 GENERALIZED ANXIETY DISORDER: ICD-10-CM

## 2024-02-02 RX ORDER — CLONAZEPAM 0.5 MG/1
0.5 TABLET ORAL
Qty: 30 TABLET | Refills: 0 | Status: SHIPPED | OUTPATIENT
Start: 2024-02-02 | End: 2024-03-03

## 2024-02-28 DIAGNOSIS — G43.009 MIGRAINE WITHOUT AURA AND WITHOUT STATUS MIGRAINOSUS, NOT INTRACTABLE: ICD-10-CM

## 2024-02-28 RX ORDER — ZOLMITRIPTAN 5 MG/1
TABLET, FILM COATED ORAL
Qty: 9 TABLET | Refills: 4 | Status: SHIPPED | OUTPATIENT
Start: 2024-02-28

## 2024-02-28 NOTE — TELEPHONE ENCOUNTER
Received request via: Pharmacy    Was the patient seen in the last year in this department? Yes    Does the patient have an active prescription (recently filled or refills available) for medication(s) requested? No    Pharmacy Name: [facility]/pharmacy #[id number] - [place], [place] - [address]     Does the patient have pen[facility]entiary Plus and need 100 day supply (blood pressure, diabetes and cholesterol meds only)? Patient does not have SCP

## 2024-03-25 ENCOUNTER — HOSPITAL ENCOUNTER (OUTPATIENT)
Dept: LAB | Facility: MEDICAL CENTER | Age: 64
End: 2024-03-25
Attending: FAMILY MEDICINE
Payer: COMMERCIAL

## 2024-03-25 DIAGNOSIS — E78.5 HYPERLIPIDEMIA, UNSPECIFIED HYPERLIPIDEMIA TYPE: ICD-10-CM

## 2024-03-25 DIAGNOSIS — E03.9 HYPOTHYROIDISM, UNSPECIFIED TYPE: ICD-10-CM

## 2024-03-25 LAB
ALBUMIN SERPL BCP-MCNC: 4.5 G/DL (ref 3.2–4.9)
ALBUMIN/GLOB SERPL: 2.5 G/DL
ALP SERPL-CCNC: 68 U/L (ref 30–99)
ALT SERPL-CCNC: 13 U/L (ref 2–50)
ANION GAP SERPL CALC-SCNC: 12 MMOL/L (ref 7–16)
AST SERPL-CCNC: 24 U/L (ref 12–45)
BASOPHILS # BLD AUTO: 0.8 % (ref 0–1.8)
BASOPHILS # BLD: 0.04 K/UL (ref 0–0.12)
BILIRUB SERPL-MCNC: 0.4 MG/DL (ref 0.1–1.5)
BUN SERPL-MCNC: 13 MG/DL (ref 8–22)
CALCIUM ALBUM COR SERPL-MCNC: 8.9 MG/DL (ref 8.5–10.5)
CALCIUM SERPL-MCNC: 9.3 MG/DL (ref 8.5–10.5)
CHLORIDE SERPL-SCNC: 103 MMOL/L (ref 96–112)
CHOLEST SERPL-MCNC: 194 MG/DL (ref 100–199)
CO2 SERPL-SCNC: 25 MMOL/L (ref 20–33)
CREAT SERPL-MCNC: 0.75 MG/DL (ref 0.5–1.4)
EOSINOPHIL # BLD AUTO: 0.14 K/UL (ref 0–0.51)
EOSINOPHIL NFR BLD: 2.7 % (ref 0–6.9)
ERYTHROCYTE [DISTWIDTH] IN BLOOD BY AUTOMATED COUNT: 42.7 FL (ref 35.9–50)
FASTING STATUS PATIENT QL REPORTED: NORMAL
GFR SERPLBLD CREATININE-BSD FMLA CKD-EPI: 88 ML/MIN/1.73 M 2
GLOBULIN SER CALC-MCNC: 1.8 G/DL (ref 1.9–3.5)
GLUCOSE SERPL-MCNC: 77 MG/DL (ref 65–99)
HCT VFR BLD AUTO: 41.6 % (ref 37–47)
HDLC SERPL-MCNC: 73 MG/DL
HGB BLD-MCNC: 14 G/DL (ref 12–16)
IMM GRANULOCYTES # BLD AUTO: 0.02 K/UL (ref 0–0.11)
IMM GRANULOCYTES NFR BLD AUTO: 0.4 % (ref 0–0.9)
LDLC SERPL CALC-MCNC: 108 MG/DL
LYMPHOCYTES # BLD AUTO: 2.31 K/UL (ref 1–4.8)
LYMPHOCYTES NFR BLD: 44.6 % (ref 22–41)
MCH RBC QN AUTO: 31.5 PG (ref 27–33)
MCHC RBC AUTO-ENTMCNC: 33.7 G/DL (ref 32.2–35.5)
MCV RBC AUTO: 93.7 FL (ref 81.4–97.8)
MONOCYTES # BLD AUTO: 0.44 K/UL (ref 0–0.85)
MONOCYTES NFR BLD AUTO: 8.5 % (ref 0–13.4)
NEUTROPHILS # BLD AUTO: 2.23 K/UL (ref 1.82–7.42)
NEUTROPHILS NFR BLD: 43 % (ref 44–72)
NRBC # BLD AUTO: 0 K/UL
NRBC BLD-RTO: 0 /100 WBC (ref 0–0.2)
PLATELET # BLD AUTO: 274 K/UL (ref 164–446)
PMV BLD AUTO: 10.4 FL (ref 9–12.9)
POTASSIUM SERPL-SCNC: 3.7 MMOL/L (ref 3.6–5.5)
PROT SERPL-MCNC: 6.3 G/DL (ref 6–8.2)
RBC # BLD AUTO: 4.44 M/UL (ref 4.2–5.4)
SODIUM SERPL-SCNC: 140 MMOL/L (ref 135–145)
T4 FREE SERPL-MCNC: 1.43 NG/DL (ref 0.93–1.7)
TRIGL SERPL-MCNC: 67 MG/DL (ref 0–149)
TSH SERPL DL<=0.005 MIU/L-ACNC: 1.53 UIU/ML (ref 0.38–5.33)
WBC # BLD AUTO: 5.2 K/UL (ref 4.8–10.8)

## 2024-03-25 PROCEDURE — 84439 ASSAY OF FREE THYROXINE: CPT

## 2024-03-25 PROCEDURE — 36415 COLL VENOUS BLD VENIPUNCTURE: CPT

## 2024-03-25 PROCEDURE — 80053 COMPREHEN METABOLIC PANEL: CPT

## 2024-03-25 PROCEDURE — 84443 ASSAY THYROID STIM HORMONE: CPT

## 2024-03-25 PROCEDURE — 85025 COMPLETE CBC W/AUTO DIFF WBC: CPT

## 2024-03-25 PROCEDURE — 80061 LIPID PANEL: CPT

## 2024-04-01 ENCOUNTER — PATIENT MESSAGE (OUTPATIENT)
Dept: BEHAVIORAL HEALTH | Facility: CLINIC | Age: 64
End: 2024-04-01
Payer: COMMERCIAL

## 2024-04-01 DIAGNOSIS — F50.81 BINGE-EATING DISORDER, IN PARTIAL REMISSION, MODERATE: ICD-10-CM

## 2024-04-01 RX ORDER — LISDEXAMFETAMINE DIMESYLATE 60 MG/1
60 CAPSULE ORAL EVERY MORNING
Qty: 30 CAPSULE | Refills: 0 | Status: SHIPPED | OUTPATIENT
Start: 2024-04-01 | End: 2024-04-08 | Stop reason: SDUPTHER

## 2024-04-01 NOTE — PATIENT COMMUNICATION
Radha pt. Can you please send refill for Vyvanse as FRANCA? Pt will have surgery tomorrow.      Received request via: Patient    Was the patient seen in the last year in this department? Yes    Does the patient have an active prescription (recently filled or refills available) for medication(s) requested? No    Ph[facility] Name: [facility]    Does the patient have group home Plus and need 100 day supply (blood pressure, diabetes and cholesterol meds only)? Medication is not for cholesterol, blood pressure or diabetes and Patient does not have SCP

## 2024-04-22 DIAGNOSIS — F51.04 CHRONIC INSOMNIA: ICD-10-CM

## 2024-04-22 DIAGNOSIS — B00.1 COLD SORE: ICD-10-CM

## 2024-04-22 DIAGNOSIS — E03.9 HYPOTHYROIDISM, UNSPECIFIED TYPE: ICD-10-CM

## 2024-04-22 RX ORDER — VALACYCLOVIR HYDROCHLORIDE 1 G/1
1000 TABLET, FILM COATED ORAL 2 TIMES DAILY
Qty: 20 TABLET | Refills: 0 | Status: SHIPPED | OUTPATIENT
Start: 2024-04-22

## 2024-04-22 RX ORDER — TRAZODONE HYDROCHLORIDE 50 MG/1
50-100 TABLET ORAL
Qty: 180 TABLET | Refills: 0 | OUTPATIENT
Start: 2024-04-22

## 2024-04-22 RX ORDER — LEVOTHYROXINE SODIUM 112 UG/1
112 TABLET ORAL
Qty: 90 TABLET | Refills: 2 | Status: SHIPPED | OUTPATIENT
Start: 2024-04-22

## 2024-04-22 NOTE — TELEPHONE ENCOUNTER
Received request via: Pharmacy    Was the patient seen in the last year in this department? Yes    Does the patient have an active prescription (recently filled or refills available) for medication(s) requested? No    Pharmacy Name: [facility]/pharmacy #[id number] - [place], [place] - [address]     Does the patient have long-term Plus and need 100 day supply (blood pressure, diabetes and cholesterol meds only)? Patient does not have SCP

## 2024-04-22 NOTE — TELEPHONE ENCOUNTER
Received request via: Pharmacy    Was the patient seen in the last year in this department? Yes    Does the patient have an active prescription (recently filled or refills available) for medication(s) requested? No    Pharmacy Name: [facility]/pharmacy #[id number] - [place], [place] - [address]     Does the patient have senior living Plus and need 100 day supply (blood pressure, diabetes and cholesterol meds only)? Patient does not have SCP

## 2024-05-03 ENCOUNTER — TELEMEDICINE (OUTPATIENT)
Dept: BEHAVIORAL HEALTH | Facility: CLINIC | Age: 64
End: 2024-05-03
Payer: MEDICARE

## 2024-05-03 DIAGNOSIS — F50.81 BINGE-EATING DISORDER, IN PARTIAL REMISSION, MODERATE: ICD-10-CM

## 2024-05-03 DIAGNOSIS — F33.41 MDD (MAJOR DEPRESSIVE DISORDER), RECURRENT, IN PARTIAL REMISSION (HCC): ICD-10-CM

## 2024-05-03 DIAGNOSIS — F41.1 GENERALIZED ANXIETY DISORDER: ICD-10-CM

## 2024-05-03 PROCEDURE — 99214 OFFICE O/P EST MOD 30 MIN: CPT | Mod: 95 | Performed by: PSYCHIATRY & NEUROLOGY

## 2024-05-03 RX ORDER — DEXTROAMPHETAMINE SACCHARATE, AMPHETAMINE ASPARTATE MONOHYDRATE, DEXTROAMPHETAMINE SULFATE AND AMPHETAMINE SULFATE 7.5; 7.5; 7.5; 7.5 MG/1; MG/1; MG/1; MG/1
30 CAPSULE, EXTENDED RELEASE ORAL EVERY MORNING
Qty: 30 CAPSULE | Refills: 0 | Status: SHIPPED | OUTPATIENT
Start: 2024-05-03 | End: 2024-05-31

## 2024-05-03 NOTE — PROGRESS NOTES
"PSYCHIATRY VIRTUAL VISIT FOLLOW-UP NOTE    Chief Complaint   Patient presents with    Follow-Up     Depression, anxiety, eating disorder     This evaluation was conducted via Zoom using secure and encrypted videoconferencing technology.   The patient was in their home in the [facility].    The patient's identity was confirmed and verbal consent was obtained for this virtual visit.    History Of Present Illness:  Sammi Alberto is a 64 y.o. female with major depressive disorder, generalized anxiety disorder, binge eating disorder, hypothyroidism, s/p lap band surgery in 2010, migraines, hepatitis C, parathyroidectomy, left ACL and meniscal repair, s/p right total hip replacement 4/2024 comes in today for follow up, was last seen 4 months ago.  She has been doing good in regards to depression, anxiety and eating disorder symptoms.  She recently had hip replacement and is recovering at home.  She has been little isolated and avoiding people.  She denies any other psychosocial stressors.  She has been compliant with her medications.  She has been having a hard time finding Vyvanse in stock at the pharmacies.  She has not noticed any struggles with binge eating the last several months.  She denies having thoughts of wanting to hurt herself.    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  Nicotine - Denies  Cannabis - Denies   Illicit drugs - Denies    Past Medication Trials:  Prozac 40 mg (effective), Effexor  mg (initally effective), Zoloft 200 mg (ineffective), Lexapro 20 mg (stopped after she had acute drop in platelet count while she was on Bactrim), Nortriptyline, Abilify 10 mg (s/e - \"made me mean\"), Lunesta (effective), Vyvanse 30-60 mg x7 years " for binge eating disorder (effective)    Medications:  Current Outpatient Medications   Medication Sig Dispense Refill    amphetamine-dextroamphetamine ER (ADDERALL XR) 30 MG XR capsule Take 1 Capsule by mouth every morning for 30 days. 30 Capsule 0    cephALEXin (KEFLEX) 500 MG Cap Take 1 Capsule by mouth 3 times a day for 10 days. 30 Capsule 0    levothyroxine (SYNTHROID) 112 MCG Tab TAKE 1 TABLET BY MOUTH EVERY DAY IN THE MORNING ON AN EMPTY STOMACH 90 Tablet 2    valacyclovir (VALTREX) 1 GM Tab TAKE 1 TABLET BY MOUTH TWICE A DAY 20 Tablet 0    zolmitriptan (ZOMIG) 5 MG tablet TAKE 1 TABLET BY MOUTH ONCE AS NEEDED FOR MIGRAINE MAY REPEAT 1 TABLET IN 2 HOURS MAX 2 TABLET IN 24 HOURS 9 Tablet 4    buPROPion (WELLBUTRIN XL) 150 MG XL tablet Take 1 Tablet by mouth every morning. Take in addition to Wellbutrin  mg for total daily dose of 450 mg. 90 Tablet 1    buPROPion (WELLBUTRIN XL) 300 MG XL tablet Take 1 Tablet by mouth every morning. Take in addition to Wellbutrin  mg for total daily dose of 450 mg. 90 Tablet 1    traZODone (DESYREL) 50 MG Tab Take 1-2 Tablets by mouth at bedtime as needed for Sleep. (Patient taking differently: Take  mg by mouth at bedtime as needed for Sleep. Takes 1\2 tab-1 one) 180 Tablet 0     No current facility-administered medications for this visit.     Review Of Systems:    Psychiatric - Positive for infrequent depression, anxiety    Physical Examination:  Vital signs: LMP  (LMP Unknown)     Musculoskeletal: No abnormal movements.     Mental Status Evaluation:   General: Middle aged female, dressed in casual attire, good grooming and hygiene, in no apparent distress, calm and cooperative, good eye contact, no psychomotor agitation or retardation  Orientation: Alert and oriented to person, place and time  Recent and remote memory: Grossly intact  Attention span and concentration: Grossly intact  Speech: Spontaneous, normal rate, rhythm and tone  Thought Process:  "Linear, logical and goal directed  Thought Content: Denies suicidal or homicidal ideations, intent or plan  Perception: No delusions noted  Associations: Intact  Language: Appropriate  Fund of knowledge and vocabulary: Grossly adequate  Mood: \"okay\"  Affect: Euthymic, mood congruent  Insight: Good  Judgment: Good    Depression screenin/31/2021     4:58 PM 2022     5:00 PM 9/15/2023     2:03 PM   Depression Screen (PHQ-2/PHQ-9)   PHQ-2 Total Score 0  0   PHQ-2 Total Score  0    PHQ-9 Total Score 0  0     Interpretation of PHQ-9 Total Score   Score Severity   1-4 No Depression   5-9 Mild Depression   10-14 Moderate Depression   15-19 Moderately Severe Depression   20-27 Severe Depression    Medical Records/Labs/Diagnostic Tests Reviewed:  [place] PDMP records - appropriate refills, no abuse suspected       Impression:  1.  Major depressive disorder, recurrent, in partial remission - stable    2.  Binge eating disorder, moderate, in partial remission - stable  3.  Generalized anxiety disorder - stable  4.  Chronic insomnia - stable     Plan:  1.  Continue Wellbutrin  mg in the morning for anxiety and depression  2.  Continue Klonopin 0.5 mg daily as needed for anxiety.  Not prescribed today.  3.  Discontinue Vyvanse.  She has been having a hard time refilling Vyvanse on a regular basis because of ongoing nationwide stimulant medication shortage.  She has been on 60 mg for several years.  Her binge eating disorder symptoms have improved significantly and ideally, would like to taper her off meds over the course of next several months.  However, given the ongoing shortage we decided to try Adderall XR 30 mg instead as Adderall is slightly more available compared to Vyvanse.  If she does well on Adderall, will slowly taper her off stimulant medication in the next few months.  I did let her know that Adderall XR is not FDA approved for binge eating disorder.   4.  Continue Trazodone 50 to 100 mg at bedtime " for sleep  5.  Continue as needed individual psychotherapy with Dr. Mckenna Jimenez, Ph.D    Return to clinic in 3 months or sooner if symptoms worsen    The proposed treatment plan was discussed with the patient who was provided the opportunity to ask questions and make suggestions regarding alternative treatment. Patient verbalized understanding and expressed agreement with the plan.     Opal Garcia M.D.  05/03/24    This note was created using voice recognition software (Dragon). The accuracy of the dictation is limited by the abilities of the software. I have reviewed the note prior to signing, however some errors in grammar and context are still possible. If you have any questions related to this note please do not hesitate to contact our office.

## 2024-05-31 ENCOUNTER — TELEMEDICINE (OUTPATIENT)
Dept: BEHAVIORAL HEALTH | Facility: CLINIC | Age: 64
End: 2024-05-31
Payer: MEDICARE

## 2024-05-31 DIAGNOSIS — F41.1 GENERALIZED ANXIETY DISORDER: ICD-10-CM

## 2024-05-31 DIAGNOSIS — F51.04 CHRONIC INSOMNIA: ICD-10-CM

## 2024-05-31 DIAGNOSIS — F33.41 MDD (MAJOR DEPRESSIVE DISORDER), RECURRENT, IN PARTIAL REMISSION (HCC): ICD-10-CM

## 2024-05-31 DIAGNOSIS — F50.81 BINGE-EATING DISORDER, IN PARTIAL REMISSION, MODERATE: ICD-10-CM

## 2024-05-31 RX ORDER — CLONAZEPAM 0.5 MG/1
0.5 TABLET ORAL
Qty: 30 TABLET | Refills: 0 | Status: SHIPPED | OUTPATIENT
Start: 2024-05-31 | End: 2024-06-30

## 2024-05-31 NOTE — PROGRESS NOTES
PSYCHIATRY VIRTUAL VISIT FOLLOW-UP NOTE    Chief Complaint   Patient presents with    Follow-Up     Depression, anxiety, eating disorder     This evaluation was conducted via Zoom using secure and encrypted videoconferencing technology.   The patient was in their home in the [facility].    The patient's identity was confirmed and verbal consent was obtained for this virtual visit.    History Of Present Illness:  Sammi Alberto is a 64 y.o. female with major depressive disorder, generalized anxiety disorder, binge eating disorder, hypothyroidism, s/p lap band surgery in 2010, migraines, hepatitis C, parathyroidectomy, left ACL and meniscal repair, s/p right total hip replacement 4/2024 comes in today for follow up, was last seen 4 weeks ago.  She was switched to Adderall XR at last appointment and that has not been helpful for her eating disorder.  She has noticed that fears related to food have come back since she has been on Adderall and she has been feeling more anxious and picking on her skin because of that.  She does feel that Adderall has given her energy for the day but it is not helping her relation with food.  She has been feeling more angry and has been isolated as well.  She is recovering from her hip surgery and is looking forward to a backpacking trip next month with her niece.  She denies having thoughts of wanting to hurt herself.    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  Nicotine - Denies  Cannabis - Denies   Illicit drugs - Denies    Past Medication Trials:  Prozac 40 mg (effective), Effexor  mg (initally effective), Zoloft 200 mg (ineffective), Lexapro 20 mg (stopped after she had acute drop in platelet count while she was on  "Bactrim), Nortriptyline, Abilify 10 mg (s/e - \"made me mean\"), Lunesta (effective), Vyvanse 30-60 mg x7 years for binge eating disorder (effective), Adderall XR 30 mg for binge eating disorder x 4 weeks (ineffective)    Medications:  Current Outpatient Medications   Medication Sig Dispense Refill    clonazePAM (KLONOPIN) 0.5 MG Tab Take 1 Tablet by mouth 1 time a day as needed (anxiety) for up to 30 days. 30 Tablet 0    levothyroxine (SYNTHROID) 112 MCG Tab TAKE 1 TABLET BY MOUTH EVERY DAY IN THE MORNING ON AN EMPTY STOMACH 90 Tablet 2    valacyclovir (VALTREX) 1 GM Tab TAKE 1 TABLET BY MOUTH TWICE A DAY 20 Tablet 0    zolmitriptan (ZOMIG) 5 MG tablet TAKE 1 TABLET BY MOUTH ONCE AS NEEDED FOR MIGRAINE MAY REPEAT 1 TABLET IN 2 HOURS MAX 2 TABLET IN 24 HOURS 9 Tablet 4    buPROPion (WELLBUTRIN XL) 150 MG XL tablet Take 1 Tablet by mouth every morning. Take in addition to Wellbutrin  mg for total daily dose of 450 mg. 90 Tablet 1    buPROPion (WELLBUTRIN XL) 300 MG XL tablet Take 1 Tablet by mouth every morning. Take in addition to Wellbutrin  mg for total daily dose of 450 mg. 90 Tablet 1    traZODone (DESYREL) 50 MG Tab Take 1-2 Tablets by mouth at bedtime as needed for Sleep. (Patient taking differently: Take  mg by mouth at bedtime as needed for Sleep. Takes 1\2 tab-1 one) 180 Tablet 0     No current facility-administered medications for this visit.     Review Of Systems:    Psychiatric - Positive for infrequent depression, anxiety    Physical Examination:  Vital signs: LMP  (LMP Unknown)     Musculoskeletal: No abnormal movements.     Mental Status Evaluation:   General: Middle aged female, dressed in casual attire, good grooming and hygiene, in no apparent distress, calm and cooperative, good eye contact, no psychomotor agitation or retardation  Orientation: Alert and oriented to person, place and time  Recent and remote memory: Grossly intact  Attention span and concentration: Grossly " "intact  Speech: Spontaneous, normal rate, rhythm and tone  Thought Process: Linear, logical and goal directed  Thought Content: Denies suicidal or homicidal ideations, intent or plan  Perception: No delusions noted  Associations: Intact  Language: Appropriate  Fund of knowledge and vocabulary: Grossly adequate  Mood: \"okay\"  Affect: Euthymic, mood congruent  Insight: Good  Judgment: Good    Depression screenin/31/2021     4:58 PM 2022     5:00 PM 9/15/2023     2:03 PM   Depression Screen (PHQ-2/PHQ-9)   PHQ-2 Total Score 0  0   PHQ-2 Total Score  0    PHQ-9 Total Score 0  0     Interpretation of PHQ-9 Total Score   Score Severity   1-4 No Depression   5-9 Mild Depression   10-14 Moderate Depression   15-19 Moderately Severe Depression   20-27 Severe Depression    Medical Records/Labs/Diagnostic Tests Reviewed:  [place] PDMP records - appropriate refills, no abuse suspected       Impression:  1.  Major depressive disorder, recurrent, in partial remission - worsening     2.  Binge eating disorder, moderate, in partial remission - worsening   3.  Generalized anxiety disorder - worsening   4.  Chronic insomnia - stable     Plan:  1.  Continue Wellbutrin  mg in the morning for anxiety and depression  2.  Continue Klonopin 0.5 mg daily as needed for anxiety.  E-prescribed x 30 days.  3.  Discontinue Adderall XR  4.  Restart Vyvanse (brand name) 60 mg in the morning for binge eating disorder.  Not prescribed today.  She is aware of the ongoing stimulant medication shortage.  She will go to [facility] and see if she can get her Vyvanse prescriptions over there and be able to use [employer] coupon.  She will let me know either today or next week and I will send in Vyvanse prescriptions.  5.  Continue Trazodone 50 to 100 mg at bedtime for sleep  6.  Continue as needed individual psychotherapy with Dr. Mckenna Jimenez, Ph.D    Return to clinic in 3 months or sooner if symptoms worsen    The proposed treatment plan " was discussed with the patient who was provided the opportunity to ask questions and make suggestions regarding alternative treatment. Patient verbalized understanding and expressed agreement with the plan.     Opal Garcia M.D.  05/31/24    This note was created using voice recognition software (Dragon). The accuracy of the dictation is limited by the abilities of the software. I have reviewed the note prior to signing, however some errors in grammar and context are still possible. If you have any questions related to this note please do not hesitate to contact our office.

## 2024-06-03 ENCOUNTER — PATIENT MESSAGE (OUTPATIENT)
Dept: BEHAVIORAL HEALTH | Facility: CLINIC | Age: 64
End: 2024-06-03
Payer: COMMERCIAL

## 2024-06-03 DIAGNOSIS — F50.81 MODERATE BINGE-EATING DISORDER, IN PARTIAL REMISSION: ICD-10-CM

## 2024-06-03 RX ORDER — LISDEXAMFETAMINE DIMESYLATE 50 MG/1
50 CAPSULE ORAL EVERY MORNING
Qty: 30 CAPSULE | Refills: 0 | Status: CANCELLED
Start: 2024-06-03 | End: 2024-07-03

## 2024-06-03 RX ORDER — LISDEXAMFETAMINE DIMESYLATE 60 MG/1
60 CAPSULE ORAL EVERY MORNING
Qty: 30 CAPSULE | Refills: 0 | Status: SHIPPED | OUTPATIENT
Start: 2024-06-03 | End: 2024-07-03

## 2024-06-03 NOTE — PATIENT COMMUNICATION
Can you please send generic Vyvanse?    Received request via: Patient    Was the patient seen in the last year in this department? Yes    Does the patient have an active prescription (recently filled or refills available) for medication(s) requested? No    Pharmacy Name: [facility]    Does the patient have [facility] Plus and need 100 day supply (blood pressure, diabetes and cholesterol meds only)? Medication is not for cholesterol, blood pressure or diabetes and Patient does not have SCP

## 2024-07-23 DIAGNOSIS — F51.04 CHRONIC INSOMNIA: ICD-10-CM

## 2024-07-23 RX ORDER — TRAZODONE HYDROCHLORIDE 50 MG/1
50-100 TABLET ORAL
Qty: 180 TABLET | Refills: 0 | Status: SHIPPED | OUTPATIENT
Start: 2024-07-23

## 2024-08-01 ENCOUNTER — PATIENT MESSAGE (OUTPATIENT)
Dept: BEHAVIORAL HEALTH | Facility: CLINIC | Age: 64
End: 2024-08-01
Payer: COMMERCIAL

## 2024-08-01 DIAGNOSIS — F41.1 GENERALIZED ANXIETY DISORDER: ICD-10-CM

## 2024-08-01 DIAGNOSIS — F50.81 MODERATE BINGE-EATING DISORDER, IN PARTIAL REMISSION: ICD-10-CM

## 2024-08-02 ENCOUNTER — PATIENT MESSAGE (OUTPATIENT)
Dept: BEHAVIORAL HEALTH | Facility: CLINIC | Age: 64
End: 2024-08-02
Payer: COMMERCIAL

## 2024-08-02 DIAGNOSIS — F41.1 GENERALIZED ANXIETY DISORDER: ICD-10-CM

## 2024-08-02 DIAGNOSIS — F50.81 MODERATE BINGE-EATING DISORDER, IN PARTIAL REMISSION: ICD-10-CM

## 2024-08-02 RX ORDER — CLONAZEPAM 0.5 MG/1
0.5 TABLET ORAL
Qty: 30 TABLET | Refills: 0 | Status: SHIPPED | OUTPATIENT
Start: 2024-08-02 | End: 2024-08-05 | Stop reason: SDUPTHER

## 2024-08-02 RX ORDER — LISDEXAMFETAMINE DIMESYLATE 60 MG/1
60 CAPSULE ORAL EVERY MORNING
Qty: 30 CAPSULE | Refills: 0 | Status: SHIPPED | OUTPATIENT
Start: 2024-08-02 | End: 2024-08-05 | Stop reason: SDUPTHER

## 2024-08-02 NOTE — PATIENT COMMUNICATION
Radha yeh    Received request via: Patient    Was the patient seen in the last year in this department? Yes    Does the patient have an active prescription (recently filled or refills available) for medication(s) requested? No    [facility]    Does the patient have [facility] Plus and need 100 day supply (blood pressure, diabetes and cholesterol meds only)? Medication is not for cholesterol, blood pressure or diabetes and Patient does not have SCP

## 2024-08-05 ENCOUNTER — PATIENT MESSAGE (OUTPATIENT)
Dept: BEHAVIORAL HEALTH | Facility: CLINIC | Age: 64
End: 2024-08-05

## 2024-08-05 DIAGNOSIS — F50.81 BINGE-EATING DISORDER, IN PARTIAL REMISSION, MODERATE: ICD-10-CM

## 2024-08-05 DIAGNOSIS — F41.1 GENERALIZED ANXIETY DISORDER: ICD-10-CM

## 2024-08-05 RX ORDER — CLONAZEPAM 0.5 MG/1
0.5 TABLET ORAL
Qty: 30 TABLET | Refills: 0 | Status: SHIPPED | OUTPATIENT
Start: 2024-08-05 | End: 2024-09-04

## 2024-08-05 RX ORDER — LISDEXAMFETAMINE DIMESYLATE 60 MG/1
60 CAPSULE ORAL EVERY MORNING
Qty: 30 CAPSULE | Refills: 0 | Status: SHIPPED | OUTPATIENT
Start: 2024-08-05 | End: 2024-09-04

## 2024-08-05 NOTE — PATIENT COMMUNICATION
Radha broussard. Medications were sent to the wrong pharmacy. Can you please resend to [facility]?    ,Received request via: Patient    Was the patient seen in the last year in this department? Yes    Does the patient have an active prescription (recently filled or refills available) for medication(s) requested? No    Pharmacy Name: [facility]    Does the patient have alf Plus and need 100 day supply (blood pressure, diabetes and cholesterol meds only)? Medication is not for cholesterol, blood pressure or diabetes and Patient does not have SCP

## 2024-08-06 RX ORDER — LISDEXAMFETAMINE DIMESYLATE 60 MG/1
60 CAPSULE ORAL EVERY MORNING
Qty: 30 CAPSULE | Refills: 0 | Status: SHIPPED | OUTPATIENT
Start: 2024-08-06 | End: 2024-09-05

## 2024-08-06 NOTE — PATIENT COMMUNICATION
Pt req updated generic vyvanse rx to be sent please cancel brand name       Received request via: Pharmacy    Was the patient seen in the last year in this department? Yes    Does the patient have an active prescription (recently filled or refills available) for medication(s) requested? No    [facility] Name[facility]     Does the patient have care home Plus and need 100 day supply (blood pressure, diabetes and cholesterol meds only)? Medication is not for cholesterol, blood pressure or diabetes

## 2024-08-23 NOTE — RESULT ENCOUNTER NOTE
Patient viewed results via [employer] Detail Level: Zone Photo Preface (Leave Blank If You Do Not Want): Photographs were obtained today

## 2024-09-04 ENCOUNTER — PATIENT MESSAGE (OUTPATIENT)
Dept: BEHAVIORAL HEALTH | Facility: CLINIC | Age: 64
End: 2024-09-04
Payer: COMMERCIAL

## 2024-09-04 DIAGNOSIS — F50.81 MODERATE BINGE-EATING DISORDER, IN PARTIAL REMISSION: ICD-10-CM

## 2024-09-04 DIAGNOSIS — F50.81 BINGE-EATING DISORDER, IN PARTIAL REMISSION, MODERATE: ICD-10-CM

## 2024-09-04 RX ORDER — LISDEXAMFETAMINE DIMESYLATE 60 MG/1
60 CAPSULE ORAL EVERY MORNING
Qty: 30 CAPSULE | Refills: 0 | Status: CANCELLED
Start: 2024-09-04 | End: 2024-10-04

## 2024-09-04 RX ORDER — LISDEXAMFETAMINE DIMESYLATE 60 MG/1
60 CAPSULE ORAL EVERY MORNING
Qty: 30 CAPSULE | Refills: 0 | Status: SHIPPED | OUTPATIENT
Start: 2024-09-04 | End: 2024-10-04

## 2024-09-04 NOTE — PATIENT COMMUNICATION
Received request via: Patient    Was the patient seen in the last year in this department? Yes    Does the patient have an active prescription (recently filled or refills available) for medication(s) requested? No    Pharmacy Name: [facility]    Does the patient have intermediate Plus and need 100-day supply? (This applies to ALL medications) Patient does not have SCP

## 2024-09-05 RX ORDER — CLONAZEPAM 0.5 MG/1
0.5 TABLET ORAL
Qty: 30 TABLET | Refills: 0 | Status: SHIPPED | OUTPATIENT
Start: 2024-09-05 | End: 2024-09-06 | Stop reason: SDUPTHER

## 2024-09-05 NOTE — PATIENT COMMUNICATION
Received request via: Patient    Was the patient seen in the last year in this department? Yes    Does the patient have an active prescription (recently filled or refills available) for medication(s) requested? No    Pharmacy Name: [facility]    Does the patient have [facility] Plus and need 100-day supply? (This applies to ALL medications) Patient does not have SCP

## 2024-09-06 ENCOUNTER — APPOINTMENT (OUTPATIENT)
Dept: BEHAVIORAL HEALTH | Facility: CLINIC | Age: 64
End: 2024-09-06

## 2024-09-06 DIAGNOSIS — F33.41 MDD (MAJOR DEPRESSIVE DISORDER), RECURRENT, IN PARTIAL REMISSION (HCC): ICD-10-CM

## 2024-09-06 DIAGNOSIS — F50.81 BINGE-EATING DISORDER, IN PARTIAL REMISSION, MODERATE: ICD-10-CM

## 2024-09-06 DIAGNOSIS — F41.1 GENERALIZED ANXIETY DISORDER: ICD-10-CM

## 2024-09-06 DIAGNOSIS — F51.04 CHRONIC INSOMNIA: ICD-10-CM

## 2024-09-06 PROCEDURE — 90833 PSYTX W PT W E/M 30 MIN: CPT | Mod: 95 | Performed by: PSYCHIATRY & NEUROLOGY

## 2024-09-06 PROCEDURE — 99214 OFFICE O/P EST MOD 30 MIN: CPT | Mod: 95 | Performed by: PSYCHIATRY & NEUROLOGY

## 2024-09-06 RX ORDER — LISDEXAMFETAMINE DIMESYLATE 60 MG/1
60 CAPSULE ORAL EVERY MORNING
Qty: 30 CAPSULE | Refills: 0 | Status: SHIPPED | OUTPATIENT
Start: 2024-11-03 | End: 2024-12-03

## 2024-09-06 RX ORDER — LISDEXAMFETAMINE DIMESYLATE 60 MG/1
60 CAPSULE ORAL EVERY MORNING
Qty: 30 CAPSULE | Refills: 0 | Status: SHIPPED | OUTPATIENT
Start: 2024-10-05 | End: 2024-11-04

## 2024-09-06 RX ORDER — CLONAZEPAM 0.5 MG/1
0.5 TABLET ORAL
Qty: 30 TABLET | Refills: 1 | Status: SHIPPED | OUTPATIENT
Start: 2024-10-04 | End: 2024-11-03

## 2024-09-06 ASSESSMENT — PATIENT HEALTH QUESTIONNAIRE - PHQ9: CLINICAL INTERPRETATION OF PHQ2 SCORE: 0

## 2024-09-06 NOTE — PROGRESS NOTES
"PSYCHIATRY VIRTUAL VISIT FOLLOW-UP NOTE    Chief Complaint   Patient presents with    Follow-Up     Depression, anxiety, eating disorder     This evaluation was conducted via Teams using secure and encrypted videoconferencing technology.   The patient was in their home in the [facility].    The patient's identity was confirmed and verbal consent was obtained for this virtual visit.    History Of Present Illness:  Sammi Alberto is a 64 y.o. female with major depressive disorder, generalized anxiety disorder, binge eating disorder, hypothyroidism, s/p lap band surgery in 2010, migraines, hepatitis C, parathyroidectomy, left ACL and meniscal repair, s/p right total hip replacement 4/2024 comes in today for follow up, was last seen over 3 months ago.  She had struggles with her mental health in the last few months as she was in between jobs.  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  She has also come to a resolution in regards to problems with IRS.  She recently took a trip to [place] and had a nice time there.  She has been compliant with her medications.  She has been back taking Vyvanse for binge eating disorder and it has been more beneficial than Adderall.  She denies having thoughts of wanting to hurt herself.    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  Nicotine - Denies  Cannabis - Denies   Illicit drugs - Denies    Past Medication Trials:  Prozac 40 mg (effective), Effexor  mg (initally effective), Zoloft 200 mg (ineffective), Lexapro 20 mg (stopped after she had acute drop in platelet count while she was on Bactrim), Nortriptyline, Abilify 10 mg (s/e - \"made me mean\"), Lunesta (effective), Vyvanse 30-60 mg " x7 years for binge eating disorder (effective), Adderall XR 30 mg for binge eating disorder x 4 weeks (ineffective)    Medications:  Current Outpatient Medications   Medication Sig Dispense Refill    [START ON 10/5/2024] VYVANSE 60 MG Cap Take 1 Capsule by mouth every morning for 30 days. 30 Capsule 0    [START ON 11/3/2024] VYVANSE 60 MG Cap Take 1 Capsule by mouth every morning for 30 days. 30 Capsule 0    [START ON 10/4/2024] clonazePAM (KLONOPIN) 0.5 MG Tab Take 1 Tablet by mouth 1 time a day as needed (anxiety) for up to 30 days. 30 Tablet 1    VYVANSE 60 MG Cap Take 1 Capsule by mouth every morning for 30 days. 30 Capsule 0    traZODone (DESYREL) 50 MG Tab Take 1-2 Tablets by mouth at bedtime as needed for Sleep. 180 Tablet 0    levothyroxine (SYNTHROID) 112 MCG Tab TAKE 1 TABLET BY MOUTH EVERY DAY IN THE MORNING ON AN EMPTY STOMACH 90 Tablet 2    valacyclovir (VALTREX) 1 GM Tab TAKE 1 TABLET BY MOUTH TWICE A DAY 20 Tablet 0    zolmitriptan (ZOMIG) 5 MG tablet TAKE 1 TABLET BY MOUTH ONCE AS NEEDED FOR MIGRAINE MAY REPEAT 1 TABLET IN 2 HOURS MAX 2 TABLET IN 24 HOURS 9 Tablet 4    buPROPion (WELLBUTRIN XL) 150 MG XL tablet Take 1 Tablet by mouth every morning. Take in addition to Wellbutrin  mg for total daily dose of 450 mg. 90 Tablet 1    buPROPion (WELLBUTRIN XL) 300 MG XL tablet Take 1 Tablet by mouth every morning. Take in addition to Wellbutrin  mg for total daily dose of 450 mg. 90 Tablet 1     No current facility-administered medications for this visit.     Review Of Systems:    Psychiatric - Positive for infrequent depression, anxiety    Physical Examination:  Vital signs: LMP  (LMP Unknown)     Musculoskeletal: No abnormal movements.     Mental Status Evaluation:   General: Middle aged female, dressed in casual attire, good grooming and hygiene, in no apparent distress, calm and cooperative, good eye contact, no psychomotor agitation or retardation  Orientation: Alert and oriented to  "person, place and time  Recent and remote memory: Grossly intact  Attention span and concentration: Grossly intact  Speech: Spontaneous, normal rate, rhythm and tone  Thought Process: Linear, logical and goal directed  Thought Content: Denies suicidal or homicidal ideations, intent or plan  Perception: No delusions noted  Associations: Intact  Language: Appropriate  Fund of knowledge and vocabulary: Grossly adequate  Mood: \"okay\"  Affect: Euthymic, mood congruent  Insight: Good  Judgment: Good    Depression screenin/28/2022     5:00 PM 9/15/2023     2:03 PM 2024     8:30 AM   Depression Screen (PHQ-2/PHQ-9)   PHQ-2 Total Score  0    PHQ-2 Total Score 0  0   PHQ-9 Total Score  0      Interpretation of PHQ-9 Total Score   Score Severity   1-4 No Depression   5-9 Mild Depression   10-14 Moderate Depression   15-19 Moderately Severe Depression   20-27 Severe Depression    Medical Records/Labs/Diagnostic Tests Reviewed:  [place] PDMP records - appropriate refills, no abuse suspected       Impression:  1.  Major depressive disorder, recurrent, in partial remission - stable     2.  Binge eating disorder, moderate, in partial remission - improving   3.  Generalized anxiety disorder - stable   4.  Chronic insomnia - stable     Plan:  1.  Continue Wellbutrin  mg in the morning for anxiety and depression  2.  Continue Klonopin 0.5 mg daily as needed for anxiety.  E-prescribed x 2 months.  3.  Continue Vyvanse (brand name) 60 mg in the morning for binge eating disorder.  E-prescribed x 2 months.  She is aware of the medication shortage.    4.  Continue Trazodone 50 to 100 mg at bedtime for sleep  5.  Continue as needed individual psychotherapy with Dr. Mckenna Jimenez, Ph.D  6.  Provided supportive psychotherapy (> 16 minutes): Struggles with trusting people, focusing on weakness vs. strengths and how her life experiences both as a child and adult affected these personality traits.    Return to clinic in 3 months or " sooner if symptoms worsen    The proposed treatment plan was discussed with the patient who was provided the opportunity to ask questions and make suggestions regarding alternative treatment. Patient verbalized understanding and expressed agreement with the plan.     Opal Garcia M.D.  09/06/24    This note was created using voice recognition software (Dragon). The accuracy of the dictation is limited by the abilities of the software. I have reviewed the note prior to signing, however some errors in grammar and context are still possible. If you have any questions related to this note please do not hesitate to contact our office.

## 2024-10-05 ENCOUNTER — PATIENT MESSAGE (OUTPATIENT)
Dept: BEHAVIORAL HEALTH | Facility: CLINIC | Age: 64
End: 2024-10-05
Payer: COMMERCIAL

## 2024-10-05 DIAGNOSIS — F50.814 BINGE-EATING DISORDER, IN PARTIAL REMISSION, MODERATE: ICD-10-CM

## 2024-10-07 RX ORDER — LISDEXAMFETAMINE DIMESYLATE 60 MG/1
60 CAPSULE ORAL EVERY MORNING
Qty: 30 CAPSULE | Refills: 0 | Status: SHIPPED | OUTPATIENT
Start: 2024-10-07 | End: 2024-11-06

## 2024-10-09 ENCOUNTER — APPOINTMENT (OUTPATIENT)
Dept: MEDICAL GROUP | Facility: MEDICAL CENTER | Age: 64
End: 2024-10-09

## 2024-10-16 DIAGNOSIS — F33.41 MDD (MAJOR DEPRESSIVE DISORDER), RECURRENT, IN PARTIAL REMISSION (HCC): ICD-10-CM

## 2024-10-16 DIAGNOSIS — F41.1 GENERALIZED ANXIETY DISORDER: ICD-10-CM

## 2024-10-16 RX ORDER — BUPROPION HYDROCHLORIDE 300 MG/1
300 TABLET ORAL EVERY MORNING
Qty: 90 TABLET | Refills: 0 | Status: SHIPPED | OUTPATIENT
Start: 2024-10-16

## 2024-10-21 DIAGNOSIS — F41.1 GENERALIZED ANXIETY DISORDER: ICD-10-CM

## 2024-10-21 DIAGNOSIS — F33.41 MDD (MAJOR DEPRESSIVE DISORDER), RECURRENT, IN PARTIAL REMISSION (HCC): ICD-10-CM

## 2024-10-22 RX ORDER — BUPROPION HYDROCHLORIDE 150 MG/1
150 TABLET ORAL EVERY MORNING
Qty: 90 TABLET | Refills: 0 | OUTPATIENT
Start: 2024-10-22

## 2024-10-28 DIAGNOSIS — F51.04 CHRONIC INSOMNIA: ICD-10-CM

## 2024-10-28 RX ORDER — TRAZODONE HYDROCHLORIDE 50 MG/1
50-100 TABLET, FILM COATED ORAL
Qty: 180 TABLET | Refills: 0 | Status: SHIPPED | OUTPATIENT
Start: 2024-10-28

## 2024-11-05 ENCOUNTER — HOSPITAL ENCOUNTER (OUTPATIENT)
Dept: LAB | Facility: MEDICAL CENTER | Age: 64
End: 2024-11-05
Payer: MEDICARE

## 2024-11-05 ENCOUNTER — OFFICE VISIT (OUTPATIENT)
Dept: MEDICAL GROUP | Facility: MEDICAL CENTER | Age: 64
End: 2024-11-05
Payer: MEDICARE

## 2024-11-05 VITALS
HEART RATE: 74 BPM | TEMPERATURE: 97.8 F | WEIGHT: 156.2 LBS | SYSTOLIC BLOOD PRESSURE: 112 MMHG | DIASTOLIC BLOOD PRESSURE: 72 MMHG | OXYGEN SATURATION: 97 % | HEIGHT: 69 IN | BODY MASS INDEX: 23.13 KG/M2

## 2024-11-05 DIAGNOSIS — Z86.19 HISTORY OF HEPATITIS C: ICD-10-CM

## 2024-11-05 DIAGNOSIS — E78.5 HYPERLIPIDEMIA, UNSPECIFIED HYPERLIPIDEMIA TYPE: ICD-10-CM

## 2024-11-05 DIAGNOSIS — Z13.0 SCREENING FOR ENDOCRINE, METABOLIC AND IMMUNITY DISORDER: ICD-10-CM

## 2024-11-05 DIAGNOSIS — Z13.228 SCREENING FOR ENDOCRINE, METABOLIC AND IMMUNITY DISORDER: ICD-10-CM

## 2024-11-05 DIAGNOSIS — Z12.31 ENCOUNTER FOR SCREENING MAMMOGRAM FOR BREAST CANCER: ICD-10-CM

## 2024-11-05 DIAGNOSIS — Z12.12 SCREENING FOR COLORECTAL CANCER: ICD-10-CM

## 2024-11-05 DIAGNOSIS — R63.5 WEIGHT GAIN: ICD-10-CM

## 2024-11-05 DIAGNOSIS — Z12.11 SCREENING FOR COLORECTAL CANCER: ICD-10-CM

## 2024-11-05 DIAGNOSIS — Z13.29 SCREENING FOR ENDOCRINE, METABOLIC AND IMMUNITY DISORDER: ICD-10-CM

## 2024-11-05 DIAGNOSIS — B00.1 COLD SORE: ICD-10-CM

## 2024-11-05 DIAGNOSIS — E03.9 HYPOTHYROIDISM, UNSPECIFIED TYPE: ICD-10-CM

## 2024-11-05 DIAGNOSIS — G43.009 MIGRAINE WITHOUT AURA AND WITHOUT STATUS MIGRAINOSUS, NOT INTRACTABLE: ICD-10-CM

## 2024-11-05 LAB
ALBUMIN SERPL BCP-MCNC: 4.4 G/DL (ref 3.2–4.9)
ALBUMIN/GLOB SERPL: 1.7 G/DL
ALP SERPL-CCNC: 87 U/L (ref 30–99)
ALT SERPL-CCNC: 14 U/L (ref 2–50)
ANION GAP SERPL CALC-SCNC: 13 MMOL/L (ref 7–16)
AST SERPL-CCNC: 25 U/L (ref 12–45)
BILIRUB SERPL-MCNC: 0.7 MG/DL (ref 0.1–1.5)
BUN SERPL-MCNC: 18 MG/DL (ref 8–22)
CALCIUM ALBUM COR SERPL-MCNC: 9.5 MG/DL (ref 8.5–10.5)
CALCIUM SERPL-MCNC: 9.8 MG/DL (ref 8.5–10.5)
CHLORIDE SERPL-SCNC: 102 MMOL/L (ref 96–112)
CHOLEST SERPL-MCNC: 224 MG/DL (ref 100–199)
CO2 SERPL-SCNC: 23 MMOL/L (ref 20–33)
CREAT SERPL-MCNC: 0.72 MG/DL (ref 0.5–1.4)
ERYTHROCYTE [DISTWIDTH] IN BLOOD BY AUTOMATED COUNT: 42.7 FL (ref 35.9–50)
GFR SERPLBLD CREATININE-BSD FMLA CKD-EPI: 92 ML/MIN/1.73 M 2
GLOBULIN SER CALC-MCNC: 2.6 G/DL (ref 1.9–3.5)
GLUCOSE SERPL-MCNC: 95 MG/DL (ref 65–99)
HCT VFR BLD AUTO: 42.6 % (ref 37–47)
HDLC SERPL-MCNC: 84 MG/DL
HGB BLD-MCNC: 13.9 G/DL (ref 12–16)
LDLC SERPL CALC-MCNC: 127 MG/DL
MCH RBC QN AUTO: 30.6 PG (ref 27–33)
MCHC RBC AUTO-ENTMCNC: 32.6 G/DL (ref 32.2–35.5)
MCV RBC AUTO: 93.8 FL (ref 81.4–97.8)
PLATELET # BLD AUTO: 271 K/UL (ref 164–446)
PMV BLD AUTO: 9.3 FL (ref 9–12.9)
POTASSIUM SERPL-SCNC: 4.3 MMOL/L (ref 3.6–5.5)
PROT SERPL-MCNC: 7 G/DL (ref 6–8.2)
RBC # BLD AUTO: 4.54 M/UL (ref 4.2–5.4)
SODIUM SERPL-SCNC: 138 MMOL/L (ref 135–145)
TRIGL SERPL-MCNC: 63 MG/DL (ref 0–149)
TSH SERPL-ACNC: 0.91 UIU/ML (ref 0.35–5.5)
WBC # BLD AUTO: 4.6 K/UL (ref 4.8–10.8)

## 2024-11-05 PROCEDURE — 99214 OFFICE O/P EST MOD 30 MIN: CPT

## 2024-11-05 PROCEDURE — 3078F DIAST BP <80 MM HG: CPT

## 2024-11-05 PROCEDURE — 85027 COMPLETE CBC AUTOMATED: CPT

## 2024-11-05 PROCEDURE — 36415 COLL VENOUS BLD VENIPUNCTURE: CPT

## 2024-11-05 PROCEDURE — 3074F SYST BP LT 130 MM HG: CPT

## 2024-11-05 PROCEDURE — 84443 ASSAY THYROID STIM HORMONE: CPT

## 2024-11-05 PROCEDURE — 80053 COMPREHEN METABOLIC PANEL: CPT

## 2024-11-05 PROCEDURE — 87522 HEPATITIS C REVRS TRNSCRPJ: CPT

## 2024-11-05 PROCEDURE — 80061 LIPID PANEL: CPT

## 2024-11-05 RX ORDER — ZOLMITRIPTAN 5 MG/1
TABLET, FILM COATED ORAL
Qty: 9 TABLET | Refills: 2 | Status: SHIPPED | OUTPATIENT
Start: 2024-11-05

## 2024-11-05 RX ORDER — LEVOTHYROXINE SODIUM 112 UG/1
112 TABLET ORAL
Qty: 90 TABLET | Refills: 0 | Status: SHIPPED | OUTPATIENT
Start: 2024-11-05

## 2024-11-05 RX ORDER — TOPIRAMATE SPINKLE 25 MG/1
25 CAPSULE ORAL 2 TIMES DAILY
Qty: 180 CAPSULE | Refills: 0 | Status: SHIPPED | OUTPATIENT
Start: 2024-11-05 | End: 2025-11-05

## 2024-11-05 RX ORDER — VALACYCLOVIR HYDROCHLORIDE 1 G/1
1000 TABLET, FILM COATED ORAL 2 TIMES DAILY
Qty: 60 TABLET | Refills: 0 | Status: SHIPPED | OUTPATIENT
Start: 2024-11-05 | End: 2025-11-05

## 2024-11-05 ASSESSMENT — ENCOUNTER SYMPTOMS
CHILLS: 0
COUGH: 0
SHORTNESS OF BREATH: 0
PALPITATIONS: 0
ORTHOPNEA: 0
FEVER: 0

## 2024-11-05 ASSESSMENT — FIBROSIS 4 INDEX: FIB4 SCORE: 1.58

## 2024-11-05 NOTE — PROGRESS NOTES
Subjective:     Verbal consent was acquired by the patient to use [employer] ambient listening note generation during this visit Yes    CC: Follow-Up (PT would like a new rx for Topamax)      HPI:   Sammi is a 64 y.o. female who presents today for:    History of Present Illness  The patient is a 64-year-old female who presents for a medication refill.    She is seeking refills for her prescriptions. She uses valacyclovir only when she experiences cold sores, which are her only manifestation of herpes. Her primary concern is obtaining refills for zolmitriptan and valacyclovir, as she tends to develop cold sores during the ski season if she is exposed to the sun without lip balm. She has approximately six or seven tablets of valacyclovir at home.     She also wishes to resume taking Topamax for her migraines, a medication she has not used for over six months. She states that her migraines have been worse and Topamax was helpful.     She feels that is overweight and is considering injectable weight loss treatments. She aims to lose 20 pounds, a goal she finds challenging due to her age. She attributes her weight gain to a total hip replacement in 04/2024 and a summer spent largely sedentary with poor eating habits.  She recently resumed her active lifestyle, which includes playing pickleball and cycling, she has been unable to lose weight since starting these activities. She fears further weight gain, having previously lost 120 pounds after having lapand surgery. She did not experience weight loss while on Topmax previously. She believes her metabolism has changed significantly, making weight loss difficult.     She has a history of hepatitis C, but her viral load has been undetectable for many years following a year-long course of interferon.     She has a Medicare drug plan with zero premium, which does not cover sumatriptan, resulting in a higher co-pay. She is willing to spend whatever is necessary to avoid  "regaining the weight. She previously weighed 270 pounds and underwent successful lap band surgery. She has not had a fill for at least 10 years. She maintained a healthy lifestyle until her hip surgery.         Allergies: Bactrim ds, Sulfa drugs, and Codeine     Medications:   Current Outpatient Medications:     valacyclovir (VALTREX) 1 GM Tab, Take 1 Tablet by mouth 2 times a day., Disp: 60 Tablet, Rfl: 0    levothyroxine (SYNTHROID) 112 MCG Tab, Take 1 Tablet by mouth every morning on an empty stomach., Disp: 90 Tablet, Rfl: 0    topiramate (TOPAMAX) 25 MG capsule, Take 1 Capsule by mouth 2 times a day., Disp: 180 Capsule, Rfl: 0    zolmitriptan (ZOMIG) 5 MG tablet, TAKE 1 TABLET BY MOUTH ONCE AS NEEDED FOR MIGRAINE MAY REPEAT 1 TABLET IN 2 HOURS MAX 2 TABLET IN 24 HOURS, Disp: 9 Tablet, Rfl: 2    traZODone (DESYREL) 50 MG Tab, Take 1-2 Tablets by mouth at bedtime as needed for Sleep., Disp: 180 Tablet, Rfl: 0    buPROPion (WELLBUTRIN XL) 300 MG XL tablet, Take 1 Tablet by mouth every morning. Take in addition to Wellbutrin  mg for total daily dose of 450 mg., Disp: 90 Tablet, Rfl: 0    Lisdexamfetamine Dimesylate 60 MG Cap, Take 1 Capsule by mouth every morning for 30 days., Disp: 30 Capsule, Rfl: 0    VYVANSE 60 MG Cap, Take 1 Capsule by mouth every morning for 30 days., Disp: 30 Capsule, Rfl: 0      ROS:  Review of Systems   Constitutional:  Negative for chills and fever.   Respiratory:  Negative for cough and shortness of breath.    Cardiovascular:  Negative for chest pain, palpitations, orthopnea and leg swelling.       Objective:     Exam:  /72   Pulse 74   Temp 36.6 °C (97.8 °F) (Temporal)   Ht 1.753 m (5' 9\")   Wt 70.9 kg (156 lb 3.2 oz)   LMP  (LMP Unknown)   SpO2 97%   BMI 23.07 kg/m²  Body mass index is 23.07 kg/m².    Physical Exam  Constitutional:       Appearance: Normal appearance.   Eyes:      Pupils: Pupils are equal, round, and reactive to light.   Cardiovascular:      Rate and " Rhythm: Normal rate and regular rhythm.      Pulses: Normal pulses.      Heart sounds: Normal heart sounds.   Pulmonary:      Effort: Pulmonary effort is normal.      Breath sounds: Normal breath sounds.   Abdominal:      General: Bowel sounds are normal.      Palpations: Abdomen is soft.   Neurological:      Mental Status: She is alert and oriented to person, place, and time.   Psychiatric:         Mood and Affect: Mood normal.         Behavior: Behavior normal.           Assessment & Plan:     Sammi toure 64 y.o. female with the following -     Assessment & Plan      1. Cold sore  Acute, uncomplicated  - valacyclovir (VALTREX) 1 GM Tab; Take 1 Tablet by mouth 2 times a day.  Dispense: 60 Tablet; Refill: 0    2. Hypothyroidism, unspecified type  Chronic, stable  - levothyroxine (SYNTHROID) 112 MCG Tab; Take 1 Tablet by mouth every morning on an empty stomach.  Dispense: 90 Tablet; Refill: 0  - TSH; Future    3. Migraine without aura and without status migrainosus, not intractable  Chronic, unstable  - topiramate (TOPAMAX) 25 MG capsule; Take 1 Capsule by mouth 2 times a day.  Dispense: 180 Capsule; Refill: 0  - zolmitriptan (ZOMIG) 5 MG tablet; TAKE 1 TABLET BY MOUTH ONCE AS NEEDED FOR MIGRAINE MAY REPEAT 1 TABLET IN 2 HOURS MAX 2 TABLET IN 24 HOURS  Dispense: 9 Tablet; Refill: 2    4. Weight gain  Acute, com[plicated  Her BMI is currently 23, which does not meet the criteria for Wegovy. We discussed that the cost of this treatment is approximately $1000 + per month out of pocket. We discussed that it is possible that she could achieve weight loss goal and subsequently discontinue the treatment. However, it is possible she could regain the weight after she discontinues the medication.Thyroid level will be checked. She is advised to continue playing pickleball and make dietary adjustments. She is also encouraged to focus on consuming protein, fruits, and vegetables, reduce carbohydrate intake, and increase physical  activity.    5. Hyperlipidemia, unspecified hyperlipidemia type  Chronic, stable  Continue to work on diet and exercise to help manage cholesterol.   - Lipid Profile; Future    6. History of hepatitis C  Chronic, stable  - HCV by Quantiative NAAT; Future    7. Screening for endocrine, metabolic and immunity disorder  - Comp Metabolic Panel; Future  - CBC WITHOUT DIFFERENTIAL; Future    8. Screening for colorectal cancer  - Cologuard® colon cancer screening    9. Encounter for screening mammogram for breast cancer  - MA-SCREENING MAMMO BILAT W/TOMOSYNTHESIS W/CAD; Future        Anticipatory guidance included the following: Patient counseled about skin care, diet, supplements, smoking, drugs/alcohol use, safe sex and exercise.     Return for F/U weight check.    Please note that this dictation was created using voice recognition software. I have made every reasonable attempt to correct obvious errors, but I expect that there are errors of grammar and possibly content that I did not discover before finalizing the note.

## 2024-11-07 DIAGNOSIS — R63.5 WEIGHT GAIN: ICD-10-CM

## 2024-11-07 RX ORDER — SEMAGLUTIDE 0.25 MG/.5ML
0.25 INJECTION, SOLUTION SUBCUTANEOUS
Qty: 2 ML | Refills: 1 | Status: SHIPPED | OUTPATIENT
Start: 2024-11-07 | End: 2024-11-25

## 2024-11-12 ENCOUNTER — APPOINTMENT (OUTPATIENT)
Dept: RADIOLOGY | Facility: MEDICAL CENTER | Age: 64
End: 2024-11-12
Payer: COMMERCIAL

## 2024-11-12 DIAGNOSIS — Z12.31 ENCOUNTER FOR SCREENING MAMMOGRAM FOR BREAST CANCER: ICD-10-CM

## 2024-11-12 PROCEDURE — 77067 SCR MAMMO BI INCL CAD: CPT

## 2024-11-21 ENCOUNTER — PATIENT MESSAGE (OUTPATIENT)
Dept: MEDICAL GROUP | Facility: MEDICAL CENTER | Age: 64
End: 2024-11-21

## 2024-11-21 DIAGNOSIS — R63.5 WEIGHT GAIN: ICD-10-CM

## 2024-11-25 DIAGNOSIS — R63.5 WEIGHT GAIN: ICD-10-CM

## 2024-11-25 RX ORDER — SEMAGLUTIDE 0.5 MG/.5ML
0.5 INJECTION, SOLUTION SUBCUTANEOUS
Qty: 2 ML | Refills: 0 | Status: SHIPPED | OUTPATIENT
Start: 2024-11-25

## 2024-12-06 ENCOUNTER — TELEMEDICINE (OUTPATIENT)
Dept: BEHAVIORAL HEALTH | Facility: CLINIC | Age: 64
End: 2024-12-06
Payer: MEDICARE

## 2024-12-06 DIAGNOSIS — F50.814 BINGE-EATING DISORDER, IN PARTIAL REMISSION, MODERATE: ICD-10-CM

## 2024-12-06 DIAGNOSIS — F41.1 GENERALIZED ANXIETY DISORDER: ICD-10-CM

## 2024-12-06 DIAGNOSIS — F51.04 CHRONIC INSOMNIA: ICD-10-CM

## 2024-12-06 DIAGNOSIS — F33.41 MDD (MAJOR DEPRESSIVE DISORDER), RECURRENT, IN PARTIAL REMISSION (HCC): ICD-10-CM

## 2024-12-06 PROBLEM — M75.82 TENDINITIS OF LEFT ROTATOR CUFF: Status: ACTIVE | Noted: 2024-11-05

## 2024-12-06 PROCEDURE — 99214 OFFICE O/P EST MOD 30 MIN: CPT | Mod: 95 | Performed by: PSYCHIATRY & NEUROLOGY

## 2024-12-06 RX ORDER — LISDEXAMFETAMINE DIMESYLATE 60 MG/1
60 CAPSULE ORAL EVERY MORNING
Qty: 30 CAPSULE | Refills: 0 | Status: SHIPPED | OUTPATIENT
Start: 2025-01-04 | End: 2025-02-03

## 2024-12-06 RX ORDER — BUPROPION HYDROCHLORIDE 150 MG/1
150 TABLET ORAL EVERY MORNING
Qty: 90 TABLET | Refills: 0 | Status: SHIPPED | OUTPATIENT
Start: 2024-12-06

## 2024-12-06 RX ORDER — MELOXICAM 15 MG/1
1 TABLET ORAL DAILY
COMMUNITY
Start: 2024-11-06

## 2024-12-06 RX ORDER — TRAZODONE HYDROCHLORIDE 50 MG/1
50-100 TABLET, FILM COATED ORAL
Qty: 180 TABLET | Refills: 0 | Status: SHIPPED | OUTPATIENT
Start: 2024-12-06

## 2024-12-06 RX ORDER — LISDEXAMFETAMINE DIMESYLATE 60 MG/1
60 CAPSULE ORAL EVERY MORNING
Qty: 30 CAPSULE | Refills: 0 | Status: SHIPPED | OUTPATIENT
Start: 2025-02-02 | End: 2025-03-04

## 2024-12-06 RX ORDER — BUPROPION HYDROCHLORIDE 300 MG/1
300 TABLET ORAL EVERY MORNING
Qty: 90 TABLET | Refills: 0 | Status: SHIPPED | OUTPATIENT
Start: 2024-12-06

## 2024-12-06 RX ORDER — LISDEXAMFETAMINE DIMESYLATE 60 MG/1
60 CAPSULE ORAL EVERY MORNING
Qty: 30 CAPSULE | Refills: 0 | Status: SHIPPED | OUTPATIENT
Start: 2024-12-06 | End: 2025-01-05

## 2024-12-06 RX ORDER — CLONAZEPAM 0.5 MG/1
0.5 TABLET ORAL
Qty: 30 TABLET | Refills: 2 | Status: SHIPPED | OUTPATIENT
Start: 2024-12-06 | End: 2025-01-05

## 2024-12-06 RX ORDER — CLONAZEPAM 0.5 MG/1
0.5 TABLET ORAL
COMMUNITY
Start: 2024-11-06 | End: 2024-12-06 | Stop reason: SDUPTHER

## 2024-12-06 NOTE — PROGRESS NOTES
PSYCHIATRY VIRTUAL VISIT FOLLOW-UP NOTE    Chief Complaint   Patient presents with    Follow-Up     Mood, anxiety, eating disorder     This evaluation was conducted via Teams using secure and encrypted videoconferencing technology.   The patient was in their home in the [facility].    The patient's identity was confirmed and verbal consent was obtained for this virtual visit.    History Of Present Illness:  Sammi Alberto is a 64 y.o. female with major depressive disorder, generalized anxiety disorder, binge eating disorder, hypothyroidism, s/p lap band surgery in 2010, migraines, hepatitis C, parathyroidectomy, left ACL and meniscal repair, s/p right total hip replacement 4/2024 comes in today for follow up, was last seen 3 months ago.  She has been doing good in regards to her mental health.  She is endorsing anxiety secondary to troubles with IRS which have been affecting her sleep.  She has been taking Klonopin almost on a daily basis after she gets done with work which is when she starts feeling anxious.  She is also taking over-the-counter Melatonin in addition to Trazodone as she is having difficulties with sleep maintenance.  She is doing good in regards to her eating disorder, has lost appetite since she started taking semaglutide and has not engaged in binge eating episodes.  She has been thinking about retiring when she turned 66 and how detention would look like for her.  She has been playing [employer] ball and is looking forward to skiing this winter.  She denies having thoughts of wanting to hurt herself.    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  Nicotine - Denies  Cannabis - Denies   Illicit drugs - Denies    Past Medication  "Trials:  Prozac 40 mg (effective), Effexor  mg (initally effective), Zoloft 200 mg (ineffective), Lexapro 20 mg (stopped after she had acute drop in platelet count while she was on Bactrim), Nortriptyline, Abilify 10 mg (s/e - \"made me mean\"), Lunesta (effective), Vyvanse 30-60 mg x7 years for binge eating disorder (effective), Adderall XR 30 mg for binge eating disorder x 4 weeks (ineffective)    Medications:  Current Outpatient Medications   Medication Sig Dispense Refill    meloxicam (MOBIC) 15 MG tablet Take 1 Tablet by mouth every day.      melatonin 5 mg Tab Take 5 mg by mouth every evening.      Lisdexamfetamine Dimesylate 60 MG Cap Take 1 Capsule by mouth every morning for 30 days. 30 Capsule 0    [START ON 1/4/2025] Lisdexamfetamine Dimesylate 60 MG Cap Take 1 Capsule by mouth every morning for 30 days. 30 Capsule 0    [START ON 2/2/2025] Lisdexamfetamine Dimesylate 60 MG Cap Take 1 Capsule by mouth every morning for 30 days. 30 Capsule 0    buPROPion (WELLBUTRIN XL) 300 MG XL tablet Take 1 Tablet by mouth every morning. Take in addition to Wellbutrin  mg for total daily dose of 450 mg. 90 Tablet 0    buPROPion (WELLBUTRIN XL) 150 MG XL tablet Take 1 Tablet by mouth every morning. Take in addition to Wellbutrin  mg for total daily dose of 450 mg. 90 Tablet 0    clonazePAM (KLONOPIN) 0.5 MG Tab Take 1 Tablet by mouth 1 time a day as needed (anxiety) for up to 30 days. 30 Tablet 2    traZODone (DESYREL) 50 MG Tab Take 1-2 Tablets by mouth at bedtime as needed for Sleep. 180 Tablet 0    Semaglutide (WEGOVY) 0.5 MG/0.5ML Solution Auto-injector Pen-injector Inject 0.5 mL under the skin every 7 days. 2 mL 0    valacyclovir (VALTREX) 1 GM Tab Take 1 Tablet by mouth 2 times a day. 60 Tablet 0    levothyroxine (SYNTHROID) 112 MCG Tab Take 1 Tablet by mouth every morning on an empty stomach. 90 Tablet 0    topiramate (TOPAMAX) 25 MG capsule Take 1 Capsule by mouth 2 times a day. 180 Capsule 0    " "zolmitriptan (ZOMIG) 5 MG tablet TAKE 1 TABLET BY MOUTH ONCE AS NEEDED FOR MIGRAINE MAY REPEAT 1 TABLET IN 2 HOURS MAX 2 TABLET IN 24 HOURS 9 Tablet 2     No current facility-administered medications for this visit.     Review Of Systems:    Psychiatric - Positive for infrequent depression, anxiety    Physical Examination:  Vital signs: LMP  (LMP Unknown)     Musculoskeletal: No abnormal movements.     Mental Status Evaluation:   General: Middle aged female, dressed in casual attire, good grooming and hygiene, in no apparent distress, calm and cooperative, good eye contact, no psychomotor agitation or retardation  Orientation: Alert and oriented to person, place and time  Recent and remote memory: Grossly intact  Attention span and concentration: Grossly intact  Speech: Spontaneous, normal rate, rhythm and tone  Thought Process: Linear, logical and goal directed  Thought Content: Denies suicidal or homicidal ideations, intent or plan  Perception: No delusions noted  Associations: Intact  Language: Appropriate  Fund of knowledge and vocabulary: Grossly adequate  Mood: \"okay\"  Affect: Euthymic, mood congruent  Insight: Good  Judgment: Good    Depression screenin/28/2022     5:00 PM 9/15/2023     2:03 PM 2024     8:30 AM   Depression Screen (PHQ-2/PHQ-9)   PHQ-2 Total Score  0    PHQ-2 Total Score 0  0   PHQ-9 Total Score  0      Interpretation of PHQ-9 Total Score   Score Severity   1-4 No Depression   5-9 Mild Depression   10-14 Moderate Depression   15-19 Moderately Severe Depression   20-27 Severe Depression    Medical Records/Labs/Diagnostic Tests Reviewed:  [place] PDMP records - appropriate refills, no abuse suspected       Impression:  1.  Major depressive disorder, recurrent, in partial remission - stable     2.  Binge eating disorder, moderate, in partial remission - stable   3.  Generalized anxiety disorder - stable   4.  Chronic insomnia - stable     Plan:  1.  Continue Wellbutrin  mg in the " morning for anxiety and depression  2.  Continue Klonopin 0.5 mg daily as needed for anxiety.  E-prescribed x 3 months.  3.  Continue Vyvanse 60 mg in the morning for binge eating disorder.  E-prescribed x 3 months.  She is aware of the medication shortage.    4.  Continue Trazodone 50 to 100 mg at bedtime for sleep  5.  Continue as needed individual psychotherapy with Dr. Mckenna Jimenez, Ph.D    Return to clinic in 3 months or sooner if symptoms worsen    The proposed treatment plan was discussed with the patient who was provided the opportunity to ask questions and make suggestions regarding alternative treatment. Patient verbalized understanding and expressed agreement with the plan.     Opal Garcia M.D.  12/06/24    This note was created using voice recognition software (Dragon). The accuracy of the dictation is limited by the abilities of the software. I have reviewed the note prior to signing, however some errors in grammar and context are still possible. If you have any questions related to this note please do not hesitate to contact our office.

## 2025-01-02 ENCOUNTER — PATIENT MESSAGE (OUTPATIENT)
Dept: MEDICAL GROUP | Facility: MEDICAL CENTER | Age: 65
End: 2025-01-02
Payer: COMMERCIAL

## 2025-01-06 ENCOUNTER — APPOINTMENT (OUTPATIENT)
Dept: MEDICAL GROUP | Facility: MEDICAL CENTER | Age: 65
End: 2025-01-06
Payer: MEDICARE

## 2025-01-08 DIAGNOSIS — G43.009 MIGRAINE WITHOUT AURA AND WITHOUT STATUS MIGRAINOSUS, NOT INTRACTABLE: ICD-10-CM

## 2025-01-08 RX ORDER — TOPIRAMATE SPINKLE 25 MG/1
25 CAPSULE ORAL 2 TIMES DAILY
Qty: 180 CAPSULE | Refills: 0 | Status: SHIPPED | OUTPATIENT
Start: 2025-01-08

## 2025-01-08 NOTE — TELEPHONE ENCOUNTER
Received request via: Pharmacy    Was the patient seen in the last year in this department? Yes    Does the patient have an active prescription (recently filled or refills available) for medication(s) requested? No    Pharmacy Name:   [facility] [address] [place], [place] - [address]  [address]  [place] [address]  Phone: [phone] Fax: [phone]      Does the patient have correction Plus and need 100-day supply? (This applies to ALL medications) Patient does not have SCP

## 2025-01-16 DIAGNOSIS — F41.1 GENERALIZED ANXIETY DISORDER: ICD-10-CM

## 2025-01-16 DIAGNOSIS — F33.41 MDD (MAJOR DEPRESSIVE DISORDER), RECURRENT, IN PARTIAL REMISSION (HCC): ICD-10-CM

## 2025-01-16 RX ORDER — BUPROPION HYDROCHLORIDE 300 MG/1
300 TABLET ORAL EVERY MORNING
Qty: 90 TABLET | Refills: 0 | OUTPATIENT
Start: 2025-01-16

## 2025-02-05 ENCOUNTER — APPOINTMENT (OUTPATIENT)
Dept: MEDICAL GROUP | Facility: MEDICAL CENTER | Age: 65
End: 2025-02-05
Payer: COMMERCIAL

## 2025-02-27 ENCOUNTER — APPOINTMENT (OUTPATIENT)
Dept: MEDICAL GROUP | Facility: MEDICAL CENTER | Age: 65
End: 2025-02-27
Payer: MEDICARE

## 2025-02-28 ENCOUNTER — TELEMEDICINE (OUTPATIENT)
Dept: BEHAVIORAL HEALTH | Facility: CLINIC | Age: 65
End: 2025-02-28
Payer: MEDICARE

## 2025-02-28 DIAGNOSIS — F33.41 MDD (MAJOR DEPRESSIVE DISORDER), RECURRENT, IN PARTIAL REMISSION (HCC): ICD-10-CM

## 2025-02-28 DIAGNOSIS — F50.814 BINGE-EATING DISORDER, IN PARTIAL REMISSION, MODERATE: ICD-10-CM

## 2025-02-28 DIAGNOSIS — F51.04 CHRONIC INSOMNIA: ICD-10-CM

## 2025-02-28 DIAGNOSIS — F41.1 GENERALIZED ANXIETY DISORDER: ICD-10-CM

## 2025-02-28 RX ORDER — CLONAZEPAM 0.5 MG/1
0.5 TABLET ORAL
COMMUNITY
End: 2025-02-28 | Stop reason: SDUPTHER

## 2025-02-28 RX ORDER — LISDEXAMFETAMINE DIMESYLATE 60 MG/1
60 CAPSULE ORAL EVERY MORNING
Qty: 30 CAPSULE | Refills: 0 | Status: SHIPPED | OUTPATIENT
Start: 2025-05-06 | End: 2025-06-05

## 2025-02-28 RX ORDER — BUPROPION HYDROCHLORIDE 300 MG/1
300 TABLET ORAL EVERY MORNING
Qty: 90 TABLET | Refills: 1 | Status: SHIPPED | OUTPATIENT
Start: 2025-02-28

## 2025-02-28 RX ORDER — LISDEXAMFETAMINE DIMESYLATE 60 MG/1
60 CAPSULE ORAL EVERY MORNING
Qty: 30 CAPSULE | Refills: 0 | Status: SHIPPED | OUTPATIENT
Start: 2025-03-09 | End: 2025-04-08

## 2025-02-28 RX ORDER — CLONAZEPAM 0.5 MG/1
0.5 TABLET ORAL
Qty: 30 TABLET | Refills: 2 | Status: SHIPPED | OUTPATIENT
Start: 2025-03-08 | End: 2025-04-07

## 2025-02-28 RX ORDER — BUPROPION HYDROCHLORIDE 150 MG/1
150 TABLET ORAL EVERY MORNING
Qty: 90 TABLET | Refills: 1 | Status: SHIPPED | OUTPATIENT
Start: 2025-02-28

## 2025-02-28 RX ORDER — LISDEXAMFETAMINE DIMESYLATE 60 MG/1
60 CAPSULE ORAL EVERY MORNING
Qty: 30 CAPSULE | Refills: 0 | Status: SHIPPED | OUTPATIENT
Start: 2025-04-07 | End: 2025-05-07

## 2025-02-28 NOTE — PROGRESS NOTES
PSYCHIATRY VIRTUAL VISIT FOLLOW-UP NOTE    Chief Complaint   Patient presents with    Follow-Up     Depression, anxiety, eating disorder     This evaluation was conducted via Teams using secure and encrypted videoconferencing technology.   The patient was in their home in the [facility].    The patient's identity was confirmed and verbal consent was obtained for this virtual visit.    History Of Present Illness:  Sammi Alberto is a 64 y.o. female with major depressive disorder, generalized anxiety disorder, binge eating disorder, hypothyroidism, s/p lap band surgery in 2010, migraines, hepatitis C, parathyroidectomy, left ACL and meniscal repair, s/p right total hip replacement 4/2024 comes in today for follow up, was last seen over 2 months ago.  She has been doing all right in regards to her mental health.  She has been staying active, has been regularly playing pickle ball and going to ski.  She denies any overwhelming struggles with depression and has had some situational anxiety related to getting work done in her kitchen.  She is compliant with her medications.  She is doing good in regards to her appetite and binge eating.  She still has some struggles with sleep but is taking only 50 mg of Trazodone nightly for sleep.  She is still taking Klonopin on a daily basis to help with anxiety.  She denies having thoughts of wanting to hurt herself.    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  Nicotine - Denies  Cannabis - Denies   Illicit drugs - Denies    Past Medication Trials:  Prozac 40 mg (effective), Effexor  mg (initally effective), Zoloft 200 mg (ineffective), Lexapro 20 mg (stopped after she had acute drop in platelet count while she  "was on Bactrim), Nortriptyline, Abilify 10 mg (s/e - \"made me mean\"), Lunesta (effective), Adderall XR 30 mg for binge eating disorder x 4 weeks (ineffective)    Medications:  Current Outpatient Medications   Medication Sig Dispense Refill    [START ON 3/9/2025] Lisdexamfetamine Dimesylate 60 MG Cap Take 1 Capsule by mouth every morning for 30 days. 30 Capsule 0    [START ON 4/7/2025] Lisdexamfetamine Dimesylate 60 MG Cap Take 1 Capsule by mouth every morning for 30 days. 30 Capsule 0    [START ON 5/6/2025] Lisdexamfetamine Dimesylate 60 MG Cap Take 1 Capsule by mouth every morning for 30 days. 30 Capsule 0    buPROPion (WELLBUTRIN XL) 150 MG XL tablet Take 1 Tablet by mouth every morning. Take in addition to Wellbutrin  mg for total daily dose of 450 mg. 90 Tablet 1    buPROPion (WELLBUTRIN XL) 300 MG XL tablet Take 1 Tablet by mouth every morning. Take in addition to Wellbutrin  mg for total daily dose of 450 mg. 90 Tablet 1    [START ON 3/8/2025] clonazePAM (KLONOPIN) 0.5 MG Tab Take 1 Tablet by mouth 1 time a day as needed (anxiety) for up to 30 days. 30 Tablet 2    topiramate (TOPAMAX) 25 MG capsule Take 1 capsule by mouth twice daily 180 Capsule 0    Semaglutide (WEGOVY) 0.5 MG/0.5ML Solution Auto-injector Pen-injector Inject 0.5 mL under the skin every 7 days for 60 days. 4 Each 1    Lisdexamfetamine Dimesylate 60 MG Cap Take 1 Capsule by mouth every morning for 30 days. 30 Capsule 0    traZODone (DESYREL) 50 MG Tab Take 1-2 Tablets by mouth at bedtime as needed for Sleep. 180 Tablet 0    valacyclovir (VALTREX) 1 GM Tab Take 1 Tablet by mouth 2 times a day. 60 Tablet 0    levothyroxine (SYNTHROID) 112 MCG Tab Take 1 Tablet by mouth every morning on an empty stomach. 90 Tablet 0    zolmitriptan (ZOMIG) 5 MG tablet TAKE 1 TABLET BY MOUTH ONCE AS NEEDED FOR MIGRAINE MAY REPEAT 1 TABLET IN 2 HOURS MAX 2 TABLET IN 24 HOURS 9 Tablet 2     No current facility-administered medications for this visit. " "    Review Of Systems:    Psychiatric - Positive for infrequent depression, anxiety    Physical Examination:  Vital signs: LMP  (LMP Unknown)     Musculoskeletal: No abnormal movements.     Mental Status Evaluation:   General: Middle aged female, dressed in casual attire, good grooming and hygiene, in no apparent distress, calm and cooperative, good eye contact, no psychomotor agitation or retardation  Orientation: Alert and oriented to person, place and time  Recent and remote memory: Grossly intact  Attention span and concentration: Grossly intact  Speech: Spontaneous, normal rate, rhythm and tone  Thought Process: Linear, logical and goal directed  Thought Content: Denies suicidal or homicidal ideations, intent or plan  Perception: No delusions noted  Associations: Intact  Language: Appropriate  Fund of knowledge and vocabulary: Grossly adequate  Mood: \"good\"  Affect: Euthymic, mood congruent  Insight: Good  Judgment: Good    Depression screenin/28/2022     5:00 PM 9/15/2023     2:03 PM 2024     8:30 AM   Depression Screen (PHQ-2/PHQ-9)   PHQ-2 Total Score  0    PHQ-2 Total Score 0  0   PHQ-9 Total Score  0      Interpretation of PHQ-9 Total Score   Score Severity   1-4 No Depression   5-9 Mild Depression   10-14 Moderate Depression   15-19 Moderately Severe Depression   20-27 Severe Depression    Medical Records/Labs/Diagnostic Tests Reviewed:  [place] PDMP records - appropriate refills, no abuse suspected       Impression:  1.  Major depressive disorder, recurrent, in partial remission - stable     2.  Binge eating disorder, moderate, in partial remission - stable   3.  Generalized anxiety disorder - stable   4.  Chronic insomnia - stable     Plan:  1.  Continue Wellbutrin  mg in the morning for anxiety and depression  2.  Continue Klonopin 0.5 mg daily as needed for anxiety.  E-prescribed x 3 months.  3.  Continue Vyvanse 60 mg in the morning for binge eating disorder.  E-prescribed x 3 months.  " She is aware of the medication shortage.    4.  Continue Trazodone 50 to 100 mg at bedtime for sleep, usually takes 50 mg for sleep  5.  Continue as needed individual psychotherapy with Dr. Mckenna Jimenez, Ph.D    Return to clinic in 3 months or sooner if symptoms worsen    The proposed treatment plan was discussed with the patient who was provided the opportunity to ask questions and make suggestions regarding alternative treatment. Patient verbalized understanding and expressed agreement with the plan.     Opal Garcia M.D.  02/28/25    This note was created using voice recognition software (Dragon). The accuracy of the dictation is limited by the abilities of the software. I have reviewed the note prior to signing, however some errors in grammar and context are still possible. If you have any questions related to this note please do not hesitate to contact our office.

## 2025-03-04 DIAGNOSIS — F51.04 CHRONIC INSOMNIA: ICD-10-CM

## 2025-03-04 RX ORDER — TRAZODONE HYDROCHLORIDE 50 MG/1
50-100 TABLET ORAL
Qty: 180 TABLET | Refills: 0 | Status: SHIPPED | OUTPATIENT
Start: 2025-03-04

## 2025-05-04 DIAGNOSIS — G43.009 MIGRAINE WITHOUT AURA AND WITHOUT STATUS MIGRAINOSUS, NOT INTRACTABLE: ICD-10-CM

## 2025-05-05 RX ORDER — TOPIRAMATE SPINKLE 25 MG/1
25 CAPSULE ORAL 2 TIMES DAILY
Qty: 180 CAPSULE | Refills: 0 | Status: SHIPPED | OUTPATIENT
Start: 2025-05-05

## 2025-05-05 NOTE — TELEPHONE ENCOUNTER
Received request via: Pharmacy    Was the patient seen in the last year in this department? Yes    Does the patient have an active prescription (recently filled or refills available) for medication(s) requested? No    Pharmacy Name:  [id number] - [place], [place] - [address]     Does the patient have care home Plus and need 100-day supply? (This applies to ALL medications) Patient does not have SCP

## 2025-05-20 ENCOUNTER — TELEPHONE (OUTPATIENT)
Dept: SCHEDULING | Facility: IMAGING CENTER | Age: 65
End: 2025-05-20
Payer: MEDICARE

## 2025-06-06 ENCOUNTER — TELEMEDICINE (OUTPATIENT)
Dept: BEHAVIORAL HEALTH | Facility: CLINIC | Age: 65
End: 2025-06-06
Payer: MEDICARE

## 2025-06-06 DIAGNOSIS — F50.814 BINGE-EATING DISORDER, IN PARTIAL REMISSION, MODERATE: ICD-10-CM

## 2025-06-06 DIAGNOSIS — F41.1 GENERALIZED ANXIETY DISORDER: ICD-10-CM

## 2025-06-06 DIAGNOSIS — F51.04 CHRONIC INSOMNIA: ICD-10-CM

## 2025-06-06 DIAGNOSIS — F33.41 MDD (MAJOR DEPRESSIVE DISORDER), RECURRENT, IN PARTIAL REMISSION (HCC): Primary | ICD-10-CM

## 2025-06-06 PROCEDURE — 90833 PSYTX W PT W E/M 30 MIN: CPT | Mod: 95 | Performed by: PSYCHIATRY & NEUROLOGY

## 2025-06-06 PROCEDURE — 99214 OFFICE O/P EST MOD 30 MIN: CPT | Mod: 95 | Performed by: PSYCHIATRY & NEUROLOGY

## 2025-06-06 RX ORDER — IBUPROFEN 800 MG/1
800 TABLET, FILM COATED ORAL EVERY 8 HOURS PRN
COMMUNITY
Start: 2025-04-24

## 2025-06-06 RX ORDER — CLONAZEPAM 0.5 MG/1
0.5 TABLET ORAL
COMMUNITY
Start: 2025-05-27 | End: 2025-06-06 | Stop reason: SDUPTHER

## 2025-06-06 RX ORDER — CLONAZEPAM 0.5 MG/1
0.5 TABLET ORAL
Qty: 30 TABLET | Refills: 2 | Status: SHIPPED | OUTPATIENT
Start: 2025-06-25 | End: 2025-07-25

## 2025-06-06 RX ORDER — LISDEXAMFETAMINE DIMESYLATE 60 MG/1
60 CAPSULE ORAL EVERY MORNING
Qty: 30 CAPSULE | Refills: 0 | Status: SHIPPED | OUTPATIENT
Start: 2025-08-22 | End: 2025-09-21

## 2025-06-06 RX ORDER — BUPROPION HYDROCHLORIDE 300 MG/1
300 TABLET ORAL EVERY MORNING
Qty: 90 TABLET | Refills: 1 | Status: SHIPPED | OUTPATIENT
Start: 2025-06-06

## 2025-06-06 RX ORDER — LISDEXAMFETAMINE DIMESYLATE 60 MG/1
60 CAPSULE ORAL EVERY MORNING
Qty: 30 CAPSULE | Refills: 0 | Status: SHIPPED | OUTPATIENT
Start: 2025-07-24 | End: 2025-08-23

## 2025-06-06 RX ORDER — LISDEXAMFETAMINE DIMESYLATE 60 MG/1
60 CAPSULE ORAL EVERY MORNING
Qty: 30 CAPSULE | Refills: 0 | Status: SHIPPED | OUTPATIENT
Start: 2025-06-25 | End: 2025-07-25

## 2025-06-06 RX ORDER — BUPROPION HYDROCHLORIDE 150 MG/1
150 TABLET ORAL EVERY MORNING
Qty: 90 TABLET | Refills: 1 | Status: SHIPPED | OUTPATIENT
Start: 2025-06-06

## 2025-06-06 NOTE — PROGRESS NOTES
PSYCHIATRY VIRTUAL VISIT FOLLOW-UP NOTE    Chief Complaint   Patient presents with    Follow-Up     Depression, anxiety, eating disorder     This evaluation was conducted via Teams using secure and encrypted videoconferencing technology.   The patient was in their home in the [facility].    The patient's identity was confirmed and verbal consent was obtained for this virtual visit.    History Of Present Illness:  Sammi Alberto is a 65 y.o. female with major depressive disorder, generalized anxiety disorder, binge eating disorder, hypothyroidism, s/p lap band surgery in 2010, migraines, hepatitis C, parathyroidectomy, left ACL and meniscal repair, s/p right total hip replacement 4/2024 comes in today for follow up, was last seen over 4 months ago.  She has been doing good in regards to her mental health.  She mentions that few months ago she saw her ex-boyfriend at a ski [place] but she did not spiral down like before.  She was able to enjoy skiing at the trip.  She has been compliant with her medications and is endorsing benefit.  She has been thinking about taking a trip to [place] and exploring her ancestry.  She is doing good in regards to sleep and appetite, denies any significant struggles with binge eating.  She mentions that there have been times where she does not take Klonopin on a regular basis.  She denies having thoughts of wanting to hurt herself.    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 for pain      [START ON 6/25/2025] Lisdexamfetamine Dimesylate 60 MG Cap Take 1 Capsule by mouth every morning for 30 days. 30 Capsule 0    [START ON 7/24/2025] Lisdexamfetamine Dimesylate 60 MG Cap Take 1 Capsule by mouth every morning for 30 days. 30 Capsule 0    [START ON 8/22/2025] Lisdexamfetamine Dimesylate 60 MG Cap Take 1 Capsule by mouth every morning for 30 days. 30 Capsule 0    buPROPion (WELLBUTRIN XL) 300 MG XL tablet Take 1 Tablet by mouth every morning. Take in addition to Wellbutrin  mg for total daily dose of 450 mg. 90 Tablet 1    buPROPion (WELLBUTRIN XL) 150 MG XL tablet Take 1 Tablet by mouth every morning. Take in addition to Wellbutrin  mg for total daily dose of 450 mg. 90 Tablet 1    [START ON 6/25/2025] clonazePAM (KLONOPIN) 0.5 MG Tab Take 1 Tablet by mouth 1 time a day as needed (anxiety) for up to 30 days. 30 Tablet 2    topiramate (TOPAMAX) 25 MG capsule Take 1 capsule by mouth twice daily 180 Capsule 0    traZODone (DESYREL) 50 MG Tab Take 1-2 Tablets by mouth at bedtime as needed for Sleep. 180 Tablet 0    valacyclovir (VALTREX) 1 GM Tab Take 1 Tablet by mouth 2 times a day. (Patient taking differently: Take 1,000 mg by mouth 1 time a day as needed (cold sore).) 60 Tablet 0    levothyroxine (SYNTHROID) 112 MCG Tab Take 1 Tablet by mouth every morning on an empty stomach. 90 Tablet 0    zolmitriptan (ZOMIG) 5 MG tablet TAKE 1 TABLET BY MOUTH ONCE AS NEEDED FOR MIGRAINE MAY REPEAT 1 TABLET IN 2 HOURS MAX 2 TABLET IN 24 HOURS 9 Tablet 2     No current facility-administered medications for this visit.     Review Of Systems:    Psychiatric - Positive for " "infrequent depression, anxiety    Physical Examination:  Vital signs: LMP  (LMP Unknown)     Musculoskeletal: No abnormal movements.     Mental Status Evaluation:   General: Elderly female, dressed in casual attire, good grooming and hygiene, in no apparent distress, calm and cooperative, good eye contact, no psychomotor agitation or retardation  Orientation: Alert and oriented to person, place and time  Recent and remote memory: Grossly intact  Attention span and concentration: Grossly intact  Speech: Spontaneous, normal rate, rhythm and tone  Thought Process: Linear, logical and goal directed  Thought Content: Denies suicidal or homicidal ideations, intent or plan  Perception: No delusions noted  Associations: Intact  Language: Appropriate  Fund of knowledge and vocabulary: Grossly adequate  Mood: \"good\"  Affect: Euthymic, mood congruent  Insight: Good  Judgment: Good    Depression screenin/28/2022     5:00 PM 9/15/2023     2:03 PM 2024     8:30 AM   Depression Screen (PHQ-2/PHQ-9)   PHQ-2 Total Score  0    PHQ-2 Total Score 0  0   PHQ-9 Total Score  0      Interpretation of PHQ-9 Total Score   Score Severity:  1-4 No Depression   5-9 Mild Depression   10-14 Moderate Depression   15-19 Moderately Severe Depression   20-27 Severe Depression    Medical Records/Labs/Diagnostic Tests Reviewed:  [place] PDMP records - appropriate refills, no abuse suspected       Impression:  1.  Major depressive disorder, recurrent, in partial remission - stable     2.  Binge eating disorder, moderate, in partial remission - stable   3.  Generalized anxiety disorder - stable   4.  Chronic insomnia - stable     Plan:  1.  Continue Wellbutrin  mg in the morning for anxiety and depression  2.  Continue Klonopin 0.5 mg daily as needed for anxiety.  E-prescribed x 3 months.  3.  Continue Vyvanse 60 mg in the morning for binge eating disorder.  E-prescribed x 3 months.  She is aware of the medication shortage.    4.  Continue " Trazodone 50 to 100 mg at bedtime for sleep, usually takes 50 mg for sleep  5.  Continue as needed individual psychotherapy with Dr. Mckenna Jimenez, Ph.D  6.  Provided supportive psychotherapy (16+ minutes): Provided encouragement in regards to seeing her ex-boyfriend few months ago at a ski and her reaction to it, discussed how the relationship affected her and the work that she has done to heal from that relationship.    Return to clinic in 3 months or sooner if symptoms worsen    The proposed treatment plan was discussed with the patient who was provided the opportunity to ask questions and make suggestions regarding alternative treatment. Patient verbalized understanding and expressed agreement with the plan.     Opal Gracia M.D.  06/06/25    This note was created using voice recognition software (Dragon). The accuracy of the dictation is limited by the abilities of the software. I have reviewed the note prior to signing, however some errors in grammar and context are still possible. If you have any questions related to this note please do not hesitate to contact our office.

## 2025-08-05 DIAGNOSIS — G43.009 MIGRAINE WITHOUT AURA AND WITHOUT STATUS MIGRAINOSUS, NOT INTRACTABLE: ICD-10-CM

## 2025-08-05 RX ORDER — TOPIRAMATE SPINKLE 25 MG/1
25 CAPSULE ORAL 2 TIMES DAILY
Qty: 180 CAPSULE | Refills: 0 | Status: SHIPPED | OUTPATIENT
Start: 2025-08-05 | End: 2025-08-11 | Stop reason: SDUPTHER

## 2025-08-06 DIAGNOSIS — E03.9 HYPOTHYROIDISM, UNSPECIFIED TYPE: ICD-10-CM

## 2025-08-06 DIAGNOSIS — G43.009 MIGRAINE WITHOUT AURA AND WITHOUT STATUS MIGRAINOSUS, NOT INTRACTABLE: ICD-10-CM

## 2025-08-06 RX ORDER — LEVOTHYROXINE SODIUM 112 UG/1
112 TABLET ORAL
Qty: 90 TABLET | Refills: 0 | Status: SHIPPED | OUTPATIENT
Start: 2025-08-06 | End: 2025-08-11 | Stop reason: SDUPTHER

## 2025-08-06 RX ORDER — ZOLMITRIPTAN 5 MG/1
TABLET, FILM COATED ORAL
Qty: 9 TABLET | Refills: 0 | Status: SHIPPED | OUTPATIENT
Start: 2025-08-06 | End: 2025-08-11 | Stop reason: SDUPTHER

## 2025-08-11 ENCOUNTER — OFFICE VISIT (OUTPATIENT)
Dept: MEDICAL GROUP | Facility: MEDICAL CENTER | Age: 65
End: 2025-08-11
Payer: MEDICARE

## 2025-08-11 VITALS
HEART RATE: 70 BPM | TEMPERATURE: 97.7 F | DIASTOLIC BLOOD PRESSURE: 60 MMHG | RESPIRATION RATE: 14 BRPM | BODY MASS INDEX: 21.15 KG/M2 | SYSTOLIC BLOOD PRESSURE: 92 MMHG | HEIGHT: 69 IN | OXYGEN SATURATION: 97 % | WEIGHT: 142.8 LBS

## 2025-08-11 DIAGNOSIS — Z13.29 SCREENING FOR ENDOCRINE, METABOLIC AND IMMUNITY DISORDER: ICD-10-CM

## 2025-08-11 DIAGNOSIS — Z13.228 SCREENING FOR ENDOCRINE, METABOLIC AND IMMUNITY DISORDER: ICD-10-CM

## 2025-08-11 DIAGNOSIS — F41.1 GENERALIZED ANXIETY DISORDER: ICD-10-CM

## 2025-08-11 DIAGNOSIS — Z76.89 ENCOUNTER TO ESTABLISH CARE: Primary | ICD-10-CM

## 2025-08-11 DIAGNOSIS — Z12.11 SCREEN FOR COLON CANCER: ICD-10-CM

## 2025-08-11 DIAGNOSIS — Z13.0 SCREENING FOR ENDOCRINE, METABOLIC AND IMMUNITY DISORDER: ICD-10-CM

## 2025-08-11 DIAGNOSIS — L82.1 SK (SEBORRHEIC KERATOSIS): ICD-10-CM

## 2025-08-11 DIAGNOSIS — B00.1 COLD SORE: ICD-10-CM

## 2025-08-11 DIAGNOSIS — E78.5 HYPERLIPIDEMIA, UNSPECIFIED HYPERLIPIDEMIA TYPE: ICD-10-CM

## 2025-08-11 DIAGNOSIS — E03.9 HYPOTHYROIDISM, UNSPECIFIED TYPE: ICD-10-CM

## 2025-08-11 DIAGNOSIS — F50.814 BINGE-EATING DISORDER, IN PARTIAL REMISSION, MODERATE: ICD-10-CM

## 2025-08-11 DIAGNOSIS — G43.009 MIGRAINE WITHOUT AURA AND WITHOUT STATUS MIGRAINOSUS, NOT INTRACTABLE: ICD-10-CM

## 2025-08-11 DIAGNOSIS — Z78.0 MENOPAUSE: ICD-10-CM

## 2025-08-11 DIAGNOSIS — F33.41 MDD (MAJOR DEPRESSIVE DISORDER), RECURRENT, IN PARTIAL REMISSION (HCC): ICD-10-CM

## 2025-08-11 PROBLEM — M75.82 TENDINITIS OF LEFT ROTATOR CUFF: Status: RESOLVED | Noted: 2024-11-05 | Resolved: 2025-08-11

## 2025-08-11 PROCEDURE — 99214 OFFICE O/P EST MOD 30 MIN: CPT

## 2025-08-11 PROCEDURE — 3078F DIAST BP <80 MM HG: CPT

## 2025-08-11 PROCEDURE — 3074F SYST BP LT 130 MM HG: CPT

## 2025-08-11 RX ORDER — LEVOTHYROXINE SODIUM 112 UG/1
112 TABLET ORAL
Qty: 90 TABLET | Refills: 3 | Status: SHIPPED | OUTPATIENT
Start: 2025-08-11

## 2025-08-11 RX ORDER — TOPIRAMATE SPINKLE 25 MG/1
25 CAPSULE ORAL 2 TIMES DAILY
Qty: 180 CAPSULE | Refills: 3 | Status: SHIPPED | OUTPATIENT
Start: 2025-08-11 | End: 2025-08-11

## 2025-08-11 RX ORDER — TOPIRAMATE 25 MG/1
25 TABLET, FILM COATED ORAL 2 TIMES DAILY
Qty: 180 TABLET | Refills: 3 | Status: SHIPPED | OUTPATIENT
Start: 2025-08-11

## 2025-08-11 RX ORDER — ZOLMITRIPTAN 5 MG/1
TABLET, FILM COATED ORAL
Qty: 9 TABLET | Refills: 6 | Status: SHIPPED | OUTPATIENT
Start: 2025-08-11

## 2025-08-11 RX ORDER — CLONAZEPAM 0.5 MG/1
0.5 TABLET ORAL
COMMUNITY
Start: 2025-08-04 | End: 2025-09-03

## 2025-08-11 RX ORDER — VALACYCLOVIR HYDROCHLORIDE 1 G/1
1000 TABLET, FILM COATED ORAL 2 TIMES DAILY PRN
Qty: 60 TABLET | Refills: 3 | Status: SHIPPED | OUTPATIENT
Start: 2025-08-11

## 2025-08-11 ASSESSMENT — ENCOUNTER SYMPTOMS
COUGH: 0
ORTHOPNEA: 0
FEVER: 0
CHILLS: 0
SHORTNESS OF BREATH: 0
PALPITATIONS: 0

## 2025-08-11 ASSESSMENT — FIBROSIS 4 INDEX: FIB4 SCORE: 1.63

## 2025-08-18 ENCOUNTER — HOSPITAL ENCOUNTER (OUTPATIENT)
Dept: RADIOLOGY | Facility: MEDICAL CENTER | Age: 65
End: 2025-08-18
Payer: MEDICARE

## 2025-08-18 DIAGNOSIS — Z78.0 MENOPAUSE: ICD-10-CM

## 2025-08-18 PROCEDURE — 77080 DXA BONE DENSITY AXIAL: CPT
